# Patient Record
Sex: MALE | Race: WHITE | NOT HISPANIC OR LATINO | Employment: OTHER | URBAN - METROPOLITAN AREA
[De-identification: names, ages, dates, MRNs, and addresses within clinical notes are randomized per-mention and may not be internally consistent; named-entity substitution may affect disease eponyms.]

---

## 2017-01-01 ENCOUNTER — APPOINTMENT (OUTPATIENT)
Dept: PHYSICAL THERAPY | Facility: CLINIC | Age: 67
DRG: 291 | End: 2017-01-01
Payer: COMMERCIAL

## 2017-01-01 ENCOUNTER — NURSING HOME VISIT (OUTPATIENT)
Dept: GERIATRICS | Facility: CLINIC | Age: 67
End: 2017-01-01
Payer: COMMERCIAL

## 2017-01-01 ENCOUNTER — APPOINTMENT (OUTPATIENT)
Dept: GENERAL RADIOLOGY | Facility: CLINIC | Age: 67
DRG: 291 | End: 2017-01-01
Attending: INTERNAL MEDICINE
Payer: COMMERCIAL

## 2017-01-01 ENCOUNTER — APPOINTMENT (OUTPATIENT)
Dept: CARDIOLOGY | Facility: CLINIC | Age: 67
DRG: 291 | End: 2017-01-01
Attending: EMERGENCY MEDICINE
Payer: COMMERCIAL

## 2017-01-01 ENCOUNTER — APPOINTMENT (OUTPATIENT)
Dept: CARDIOLOGY | Facility: CLINIC | Age: 67
End: 2017-01-01
Attending: FAMILY MEDICINE
Payer: COMMERCIAL

## 2017-01-01 ENCOUNTER — TRANSFERRED RECORDS (OUTPATIENT)
Dept: HEALTH INFORMATION MANAGEMENT | Facility: CLINIC | Age: 67
End: 2017-01-01

## 2017-01-01 ENCOUNTER — PRE VISIT (OUTPATIENT)
Dept: GASTROENTEROLOGY | Facility: CLINIC | Age: 67
End: 2017-01-01

## 2017-01-01 ENCOUNTER — HOSPITAL ENCOUNTER (EMERGENCY)
Facility: CLINIC | Age: 67
Discharge: HOME | End: 2017-01-23
Attending: FAMILY MEDICINE | Admitting: FAMILY MEDICINE
Payer: COMMERCIAL

## 2017-01-01 ENCOUNTER — CARE COORDINATION (OUTPATIENT)
Dept: CARE COORDINATION | Facility: CLINIC | Age: 67
End: 2017-01-01

## 2017-01-01 ENCOUNTER — APPOINTMENT (OUTPATIENT)
Dept: GENERAL RADIOLOGY | Facility: CLINIC | Age: 67
DRG: 291 | End: 2017-01-01
Attending: EMERGENCY MEDICINE
Payer: COMMERCIAL

## 2017-01-01 ENCOUNTER — PRE VISIT (OUTPATIENT)
Dept: CARDIOLOGY | Facility: CLINIC | Age: 67
End: 2017-01-01

## 2017-01-01 ENCOUNTER — DOCUMENTATION ONLY (OUTPATIENT)
Dept: OTHER | Facility: CLINIC | Age: 67
End: 2017-01-01

## 2017-01-01 ENCOUNTER — OFFICE VISIT (OUTPATIENT)
Dept: CARDIOLOGY | Facility: CLINIC | Age: 67
End: 2017-01-01
Attending: NURSE PRACTITIONER
Payer: COMMERCIAL

## 2017-01-01 ENCOUNTER — APPOINTMENT (OUTPATIENT)
Dept: OCCUPATIONAL THERAPY | Facility: CLINIC | Age: 67
DRG: 291 | End: 2017-01-01
Attending: EMERGENCY MEDICINE
Payer: COMMERCIAL

## 2017-01-01 ENCOUNTER — NURSING HOME VISIT (OUTPATIENT)
Dept: GERIATRICS | Facility: CLINIC | Age: 67
End: 2017-01-01

## 2017-01-01 ENCOUNTER — APPOINTMENT (OUTPATIENT)
Dept: GENERAL RADIOLOGY | Facility: CLINIC | Age: 67
End: 2017-01-01
Attending: FAMILY MEDICINE
Payer: COMMERCIAL

## 2017-01-01 ENCOUNTER — APPOINTMENT (OUTPATIENT)
Dept: CT IMAGING | Facility: CLINIC | Age: 67
DRG: 291 | End: 2017-01-01
Attending: EMERGENCY MEDICINE
Payer: COMMERCIAL

## 2017-01-01 ENCOUNTER — APPOINTMENT (OUTPATIENT)
Dept: PHYSICAL THERAPY | Facility: CLINIC | Age: 67
DRG: 291 | End: 2017-01-01
Attending: EMERGENCY MEDICINE
Payer: COMMERCIAL

## 2017-01-01 ENCOUNTER — HOSPITAL ENCOUNTER (INPATIENT)
Facility: CLINIC | Age: 67
LOS: 6 days | DRG: 291 | End: 2017-05-11
Attending: EMERGENCY MEDICINE | Admitting: INTERNAL MEDICINE
Payer: COMMERCIAL

## 2017-01-01 ENCOUNTER — TELEPHONE (OUTPATIENT)
Dept: PHARMACY | Facility: OTHER | Age: 67
End: 2017-01-01

## 2017-01-01 ENCOUNTER — APPOINTMENT (OUTPATIENT)
Dept: ULTRASOUND IMAGING | Facility: CLINIC | Age: 67
DRG: 291 | End: 2017-01-01
Attending: EMERGENCY MEDICINE
Payer: COMMERCIAL

## 2017-01-01 VITALS — OXYGEN SATURATION: 94 % | SYSTOLIC BLOOD PRESSURE: 94 MMHG | HEART RATE: 91 BPM | DIASTOLIC BLOOD PRESSURE: 60 MMHG

## 2017-01-01 VITALS
OXYGEN SATURATION: 96 % | WEIGHT: 212.8 LBS | BODY MASS INDEX: 28.82 KG/M2 | HEIGHT: 72 IN | SYSTOLIC BLOOD PRESSURE: 96 MMHG | TEMPERATURE: 97 F | DIASTOLIC BLOOD PRESSURE: 56 MMHG | HEART RATE: 79 BPM | RESPIRATION RATE: 15 BRPM

## 2017-01-01 VITALS
DIASTOLIC BLOOD PRESSURE: 69 MMHG | SYSTOLIC BLOOD PRESSURE: 108 MMHG | BODY MASS INDEX: 25.6 KG/M2 | OXYGEN SATURATION: 100 % | HEIGHT: 72 IN | RESPIRATION RATE: 13 BRPM | TEMPERATURE: 98.8 F | WEIGHT: 189 LBS

## 2017-01-01 VITALS
DIASTOLIC BLOOD PRESSURE: 63 MMHG | BODY MASS INDEX: 29.59 KG/M2 | OXYGEN SATURATION: 92 % | SYSTOLIC BLOOD PRESSURE: 103 MMHG | HEIGHT: 72 IN | WEIGHT: 218.5 LBS | HEART RATE: 86 BPM

## 2017-01-01 VITALS
OXYGEN SATURATION: 95 % | BODY MASS INDEX: 25.25 KG/M2 | RESPIRATION RATE: 18 BRPM | WEIGHT: 186.2 LBS | SYSTOLIC BLOOD PRESSURE: 104 MMHG | TEMPERATURE: 97.1 F | DIASTOLIC BLOOD PRESSURE: 68 MMHG | HEART RATE: 84 BPM

## 2017-01-01 VITALS
WEIGHT: 187.2 LBS | DIASTOLIC BLOOD PRESSURE: 57 MMHG | OXYGEN SATURATION: 95 % | HEART RATE: 77 BPM | SYSTOLIC BLOOD PRESSURE: 90 MMHG | RESPIRATION RATE: 22 BRPM | TEMPERATURE: 97.6 F | BODY MASS INDEX: 25.38 KG/M2

## 2017-01-01 VITALS
TEMPERATURE: 97.8 F | SYSTOLIC BLOOD PRESSURE: 89 MMHG | DIASTOLIC BLOOD PRESSURE: 51 MMHG | HEART RATE: 78 BPM | RESPIRATION RATE: 20 BRPM | WEIGHT: 186 LBS | HEIGHT: 73 IN | BODY MASS INDEX: 24.65 KG/M2 | OXYGEN SATURATION: 94 %

## 2017-01-01 VITALS
SYSTOLIC BLOOD PRESSURE: 104 MMHG | HEART RATE: 77 BPM | OXYGEN SATURATION: 100 % | BODY MASS INDEX: 25.36 KG/M2 | WEIGHT: 187 LBS | TEMPERATURE: 97.7 F | DIASTOLIC BLOOD PRESSURE: 52 MMHG | RESPIRATION RATE: 18 BRPM

## 2017-01-01 DIAGNOSIS — I95.9 HYPOTENSION, UNSPECIFIED HYPOTENSION TYPE: ICD-10-CM

## 2017-01-01 DIAGNOSIS — I50.23 ACUTE ON CHRONIC SYSTOLIC CONGESTIVE HEART FAILURE (H): ICD-10-CM

## 2017-01-01 DIAGNOSIS — R53.81 PHYSICAL DECONDITIONING: ICD-10-CM

## 2017-01-01 DIAGNOSIS — E87.1 HYPONATREMIA: ICD-10-CM

## 2017-01-01 DIAGNOSIS — N18.30 CKD (CHRONIC KIDNEY DISEASE) STAGE 3, GFR 30-59 ML/MIN (H): ICD-10-CM

## 2017-01-01 DIAGNOSIS — K59.09 CHRONIC CONSTIPATION: ICD-10-CM

## 2017-01-01 DIAGNOSIS — I87.2 CHRONIC VENOUS STASIS DERMATITIS OF BOTH LOWER EXTREMITIES: ICD-10-CM

## 2017-01-01 DIAGNOSIS — I50.22 CHRONIC SYSTOLIC HEART FAILURE (H): Primary | ICD-10-CM

## 2017-01-01 DIAGNOSIS — E03.9 HYPOTHYROIDISM, UNSPECIFIED TYPE: Primary | ICD-10-CM

## 2017-01-01 DIAGNOSIS — I50.22 CHRONIC SYSTOLIC CONGESTIVE HEART FAILURE (H): ICD-10-CM

## 2017-01-01 DIAGNOSIS — I25.5 ISCHEMIC CARDIOMYOPATHY: ICD-10-CM

## 2017-01-01 DIAGNOSIS — E03.9 HYPOTHYROIDISM, UNSPECIFIED TYPE: ICD-10-CM

## 2017-01-01 DIAGNOSIS — J44.9 CHRONIC OBSTRUCTIVE PULMONARY DISEASE, UNSPECIFIED COPD TYPE (H): ICD-10-CM

## 2017-01-01 DIAGNOSIS — I50.23 ACUTE ON CHRONIC SYSTOLIC (CONGESTIVE) HEART FAILURE (H): ICD-10-CM

## 2017-01-01 DIAGNOSIS — K92.2 GASTROINTESTINAL HEMORRHAGE, UNSPECIFIED GASTROINTESTINAL HEMORRHAGE TYPE: Primary | ICD-10-CM

## 2017-01-01 DIAGNOSIS — E11.59 TYPE 2 DIABETES MELLITUS WITH OTHER CIRCULATORY COMPLICATION: ICD-10-CM

## 2017-01-01 DIAGNOSIS — I50.43 ACUTE ON CHRONIC COMBINED SYSTOLIC AND DIASTOLIC CONGESTIVE HEART FAILURE (H): ICD-10-CM

## 2017-01-01 DIAGNOSIS — Z87.891 PERSONAL HISTORY OF TOBACCO USE, PRESENTING HAZARDS TO HEALTH: ICD-10-CM

## 2017-01-01 DIAGNOSIS — E11.22 TYPE 2 DIABETES MELLITUS WITH DIABETIC CHRONIC KIDNEY DISEASE, UNSPECIFIED CKD STAGE, UNSPECIFIED LONG TERM INSULIN USE STATUS: ICD-10-CM

## 2017-01-01 DIAGNOSIS — D64.9 ANEMIA, UNSPECIFIED TYPE: ICD-10-CM

## 2017-01-01 DIAGNOSIS — I34.0 MITRAL VALVE INSUFFICIENCY, UNSPECIFIED ETIOLOGY: ICD-10-CM

## 2017-01-01 DIAGNOSIS — R33.9 URINARY RETENTION: ICD-10-CM

## 2017-01-01 DIAGNOSIS — N18.30 CHRONIC KIDNEY DISEASE, STAGE III (MODERATE) (H): ICD-10-CM

## 2017-01-01 DIAGNOSIS — R06.02 SHORTNESS OF BREATH: Primary | ICD-10-CM

## 2017-01-01 DIAGNOSIS — R06.02 SHORTNESS OF BREATH: ICD-10-CM

## 2017-01-01 DIAGNOSIS — I50.22 CHRONIC SYSTOLIC HEART FAILURE (H): ICD-10-CM

## 2017-01-01 DIAGNOSIS — I48.0 PAROXYSMAL ATRIAL FIBRILLATION (H): ICD-10-CM

## 2017-01-01 DIAGNOSIS — R22.43 LOCALIZED SWELLING OF BOTH LOWER LEGS: ICD-10-CM

## 2017-01-01 DIAGNOSIS — K92.2 GASTROINTESTINAL HEMORRHAGE, UNSPECIFIED GASTROINTESTINAL HEMORRHAGE TYPE: ICD-10-CM

## 2017-01-01 DIAGNOSIS — I50.9 CHRONIC CONGESTIVE HEART FAILURE, UNSPECIFIED CONGESTIVE HEART FAILURE TYPE: ICD-10-CM

## 2017-01-01 DIAGNOSIS — I25.9 CHRONIC ISCHEMIC HEART DISEASE: ICD-10-CM

## 2017-01-01 DIAGNOSIS — Z71.89 ADVANCED DIRECTIVES, COUNSELING/DISCUSSION: Chronic | ICD-10-CM

## 2017-01-01 DIAGNOSIS — I13.0 BENIGN HYPERTENSIVE HEART AND RENAL DISEASE WITH HEART FAILURE (H): ICD-10-CM

## 2017-01-01 DIAGNOSIS — I50.22 CHRONIC SYSTOLIC CONGESTIVE HEART FAILURE (H): Primary | ICD-10-CM

## 2017-01-01 LAB
ABO + RH BLD: NORMAL
ABO + RH BLD: NORMAL
ALBUMIN SERPL-MCNC: 1.8 G/DL (ref 3.4–5)
ALBUMIN SERPL-MCNC: 2.5 G/DL (ref 3.4–5)
ALBUMIN SERPL-MCNC: 2.5 G/DL (ref 3.4–5)
ALBUMIN UR-MCNC: 10 MG/DL
ALBUMIN UR-MCNC: NEGATIVE MG/DL
ALBUMIN UR-MCNC: NEGATIVE MG/DL
ALP SERPL-CCNC: 284 U/L (ref 40–150)
ALP SERPL-CCNC: 347 U/L (ref 40–150)
ALP SERPL-CCNC: 370 U/L (ref 40–150)
ALT SERPL W P-5'-P-CCNC: 14 U/L (ref 0–70)
ALT SERPL W P-5'-P-CCNC: 14 U/L (ref 0–70)
ALT SERPL W P-5'-P-CCNC: 16 U/L (ref 0–70)
AMMONIA PLAS-SCNC: 26 UMOL/L (ref 10–50)
ANION GAP SERPL CALCULATED.3IONS-SCNC: 10 MMOL/L (ref 3–14)
ANION GAP SERPL CALCULATED.3IONS-SCNC: 10 MMOL/L (ref 3–14)
ANION GAP SERPL CALCULATED.3IONS-SCNC: 5 MMOL/L (ref 3–14)
ANION GAP SERPL CALCULATED.3IONS-SCNC: 5 MMOL/L (ref 5–18)
ANION GAP SERPL CALCULATED.3IONS-SCNC: 6 MMOL/L (ref 3–14)
ANION GAP SERPL CALCULATED.3IONS-SCNC: 6 MMOL/L (ref 5–18)
ANION GAP SERPL CALCULATED.3IONS-SCNC: 7 MMOL/L (ref 3–14)
ANION GAP SERPL CALCULATED.3IONS-SCNC: 7 MMOL/L (ref 3–14)
ANION GAP SERPL CALCULATED.3IONS-SCNC: 8 MMOL/L (ref 3–14)
ANION GAP SERPL CALCULATED.3IONS-SCNC: 8 MMOL/L (ref 3–14)
ANION GAP SERPL CALCULATED.3IONS-SCNC: 9 MMOL/L (ref 3–14)
APPEARANCE UR: CLEAR
APTT PPP: 41 SEC (ref 22–37)
AST SERPL W P-5'-P-CCNC: 30 U/L (ref 0–45)
AST SERPL W P-5'-P-CCNC: 30 U/L (ref 0–45)
AST SERPL W P-5'-P-CCNC: 31 U/L (ref 0–45)
BACTERIA #/AREA URNS HPF: ABNORMAL /HPF
BACTERIA SPEC CULT: ABNORMAL
BACTERIA SPEC CULT: NO GROWTH
BASE EXCESS BLDA CALC-SCNC: 9.3 MMOL/L
BASOPHILS # BLD AUTO: 0 10E9/L (ref 0–0.2)
BASOPHILS NFR BLD AUTO: 0.2 %
BILIRUB SERPL-MCNC: 0.4 MG/DL (ref 0.2–1.3)
BILIRUB SERPL-MCNC: 0.7 MG/DL (ref 0.2–1.3)
BILIRUB SERPL-MCNC: 1.2 MG/DL (ref 0.2–1.3)
BILIRUB UR QL STRIP: NEGATIVE
BLD GP AB SCN SERPL QL: NORMAL
BLOOD BANK CMNT PATIENT-IMP: NORMAL
BUN SERPL-MCNC: 35 MG/DL (ref 7–30)
BUN SERPL-MCNC: 35 MG/DL (ref 7–30)
BUN SERPL-MCNC: 36 MG/DL (ref 7–30)
BUN SERPL-MCNC: 37 MG/DL (ref 7–30)
BUN SERPL-MCNC: 37 MG/DL (ref 7–30)
BUN SERPL-MCNC: 38 MG/DL (ref 7–30)
BUN SERPL-MCNC: 38 MG/DL (ref 7–30)
BUN SERPL-MCNC: 54 MG/DL (ref 7–30)
BUN SERPL-MCNC: 56 MG/DL (ref 7–30)
BUN SERPL-MCNC: 58 MG/DL (ref 8–22)
BUN SERPL-MCNC: 66 MG/DL (ref 8–22)
CALCIUM SERPL-MCNC: 8.3 MG/DL (ref 8.5–10.1)
CALCIUM SERPL-MCNC: 8.5 MG/DL (ref 8.5–10.1)
CALCIUM SERPL-MCNC: 8.6 MG/DL (ref 8.5–10.1)
CALCIUM SERPL-MCNC: 8.6 MG/DL (ref 8.5–10.1)
CALCIUM SERPL-MCNC: 8.8 MG/DL (ref 8.5–10.1)
CALCIUM SERPL-MCNC: 8.9 MG/DL (ref 8.5–10.1)
CALCIUM SERPL-MCNC: 9 MG/DL (ref 8.5–10.1)
CALCIUM SERPL-MCNC: 9 MG/DL (ref 8.5–10.1)
CALCIUM SERPL-MCNC: 9.1 MG/DL (ref 8.5–10.5)
CALCIUM SERPL-MCNC: 9.2 MG/DL (ref 8.5–10.5)
CAOX CRY #/AREA URNS HPF: ABNORMAL /HPF
CHLORIDE SERPL-SCNC: 89 MMOL/L (ref 94–109)
CHLORIDE SERPL-SCNC: 90 MMOL/L (ref 94–109)
CHLORIDE SERPL-SCNC: 91 MMOL/L (ref 94–109)
CHLORIDE SERPL-SCNC: 92 MMOL/L (ref 94–109)
CHLORIDE SERPL-SCNC: 92 MMOL/L (ref 94–109)
CHLORIDE SERPL-SCNC: 93 MMOL/L (ref 94–109)
CHLORIDE SERPL-SCNC: 93 MMOL/L (ref 94–109)
CHLORIDE SERPL-SCNC: 94 MMOL/L (ref 94–109)
CHLORIDE SERPL-SCNC: 94 MMOL/L (ref 94–109)
CHLORIDE SERPL-SCNC: 95 MMOL/L (ref 94–109)
CHLORIDE SERPL-SCNC: 96 MMOL/L (ref 94–109)
CHLORIDE SERPLBLD-SCNC: 92 MMOL/L (ref 98–107)
CHLORIDE SERPLBLD-SCNC: 93 MMOL/L (ref 98–107)
CO2 BLDCOV-SCNC: 36 MMOL/L (ref 21–28)
CO2 SERPL-SCNC: 28 MMOL/L (ref 20–32)
CO2 SERPL-SCNC: 29 MMOL/L (ref 20–32)
CO2 SERPL-SCNC: 30 MMOL/L (ref 20–32)
CO2 SERPL-SCNC: 31 MMOL/L (ref 20–32)
CO2 SERPL-SCNC: 31 MMOL/L (ref 20–32)
CO2 SERPL-SCNC: 31 MMOL/L (ref 22–31)
CO2 SERPL-SCNC: 32 MMOL/L (ref 20–32)
CO2 SERPL-SCNC: 32 MMOL/L (ref 20–32)
CO2 SERPL-SCNC: 33 MMOL/L (ref 20–32)
CO2 SERPL-SCNC: 33 MMOL/L (ref 22–31)
CO2 SERPL-SCNC: 34 MMOL/L (ref 20–32)
COLOR UR AUTO: NORMAL
COLOR UR AUTO: YELLOW
COLOR UR AUTO: YELLOW
CREAT SERPL-MCNC: 1.35 MG/DL (ref 0.66–1.25)
CREAT SERPL-MCNC: 1.36 MG/DL (ref 0.66–1.25)
CREAT SERPL-MCNC: 1.45 MG/DL (ref 0.66–1.25)
CREAT SERPL-MCNC: 1.45 MG/DL (ref 0.66–1.25)
CREAT SERPL-MCNC: 1.49 MG/DL (ref 0.66–1.25)
CREAT SERPL-MCNC: 1.57 MG/DL (ref 0.66–1.25)
CREAT SERPL-MCNC: 1.65 MG/DL (ref 0.66–1.25)
CREAT SERPL-MCNC: 1.65 MG/DL (ref 0.7–1.3)
CREAT SERPL-MCNC: 1.71 MG/DL (ref 0.66–1.25)
CREAT SERPL-MCNC: 1.8 MG/DL (ref 0.66–1.25)
CREAT SERPL-MCNC: 1.85 MG/DL (ref 0.66–1.25)
CREAT SERPL-MCNC: 1.87 MG/DL (ref 0.66–1.25)
CREAT SERPL-MCNC: 1.91 MG/DL (ref 0.66–1.25)
CREAT SERPL-MCNC: 2.18 MG/DL (ref 0.7–1.3)
DEPRECATED S PNEUM 1 IGG SER-MCNC: 12.1 UG/ML
DEPRECATED S PNEUM12 IGG SER-MCNC: 3.4 UG/ML
DEPRECATED S PNEUM14 IGG SER-MCNC: 17.9 UG/ML
DEPRECATED S PNEUM17 IGG SER-MCNC: 26.7 UG/ML
DEPRECATED S PNEUM19 IGG SER-MCNC: 18.5 UG/ML
DEPRECATED S PNEUM2 IGG SER-MCNC: 8.7 UG/ML
DEPRECATED S PNEUM20 IGG SER-MCNC: 8.3 UG/ML
DEPRECATED S PNEUM22 IGG SER-MCNC: 30.5 UG/ML
DEPRECATED S PNEUM23 IGG SER-MCNC: 62.1 UG/ML
DEPRECATED S PNEUM3 IGG SER-MCNC: 5.4 UG/ML
DEPRECATED S PNEUM34 IGG SER-MCNC: 31.4 UG/ML
DEPRECATED S PNEUM4 IGG SER-MCNC: 1.9 UG/ML
DEPRECATED S PNEUM43 IGG SER-MCNC: 2.7 UG/ML
DEPRECATED S PNEUM5 IGG SER-MCNC: 17.7 UG/ML
DEPRECATED S PNEUM8 IGG SER-MCNC: 9.9 UG/ML
DEPRECATED S PNEUM9 IGG SER-MCNC: 14.1 UG/ML
DIFFERENTIAL METHOD BLD: ABNORMAL
EOSINOPHIL # BLD AUTO: 0 10E9/L (ref 0–0.7)
EOSINOPHIL # BLD AUTO: 0 10E9/L (ref 0–0.7)
EOSINOPHIL # BLD AUTO: 0.2 10E9/L (ref 0–0.7)
EOSINOPHIL NFR BLD AUTO: 0.2 %
EOSINOPHIL NFR BLD AUTO: 0.2 %
EOSINOPHIL NFR BLD AUTO: 1.6 %
ERYTHROCYTE [DISTWIDTH] IN BLOOD BY AUTOMATED COUNT: 17.2 % (ref 10–15)
ERYTHROCYTE [DISTWIDTH] IN BLOOD BY AUTOMATED COUNT: 17.2 % (ref 10–15)
ERYTHROCYTE [DISTWIDTH] IN BLOOD BY AUTOMATED COUNT: 17.4 % (ref 10–15)
ERYTHROCYTE [DISTWIDTH] IN BLOOD BY AUTOMATED COUNT: 17.9 % (ref 10–15)
ERYTHROCYTE [DISTWIDTH] IN BLOOD BY AUTOMATED COUNT: 21.1 % (ref 10–15)
FLUAV H1 2009 PAND RNA SPEC QL NAA+PROBE: NEGATIVE
FLUAV H1 RNA SPEC QL NAA+PROBE: NEGATIVE
FLUAV H3 RNA SPEC QL NAA+PROBE: NEGATIVE
FLUAV RNA SPEC QL NAA+PROBE: ABNORMAL
FLUAV RNA SPEC QL NAA+PROBE: NEGATIVE
FLUAV+FLUBV AG SPEC QL: NEGATIVE
FLUAV+FLUBV AG SPEC QL: NORMAL
FLUBV RNA SPEC QL NAA+PROBE: NEGATIVE
GFR SERPL CREATININE-BSD FRML MDRD: 31 ML/MIN/1.73M2
GFR SERPL CREATININE-BSD FRML MDRD: 36 ML/MIN/1.7M2
GFR SERPL CREATININE-BSD FRML MDRD: 36 ML/MIN/1.7M2
GFR SERPL CREATININE-BSD FRML MDRD: 37 ML/MIN/1.7M2
GFR SERPL CREATININE-BSD FRML MDRD: 38 ML/MIN/1.7M2
GFR SERPL CREATININE-BSD FRML MDRD: 40 ML/MIN/1.7M2
GFR SERPL CREATININE-BSD FRML MDRD: 42 ML/MIN/1.73M2
GFR SERPL CREATININE-BSD FRML MDRD: 42 ML/MIN/1.7M2
GFR SERPL CREATININE-BSD FRML MDRD: 45 ML/MIN/1.7M2
GFR SERPL CREATININE-BSD FRML MDRD: 47 ML/MIN/1.7M2
GFR SERPL CREATININE-BSD FRML MDRD: 49 ML/MIN/1.7M2
GFR SERPL CREATININE-BSD FRML MDRD: 49 ML/MIN/1.7M2
GFR SERPL CREATININE-BSD FRML MDRD: 53 ML/MIN/1.7M2
GFR SERPL CREATININE-BSD FRML MDRD: 53 ML/MIN/1.7M2
GLUCOSE SERPL-MCNC: 102 MG/DL (ref 70–99)
GLUCOSE SERPL-MCNC: 106 MG/DL (ref 70–99)
GLUCOSE SERPL-MCNC: 107 MG/DL (ref 70–99)
GLUCOSE SERPL-MCNC: 107 MG/DL (ref 70–99)
GLUCOSE SERPL-MCNC: 118 MG/DL (ref 70–99)
GLUCOSE SERPL-MCNC: 123 MG/DL (ref 70–99)
GLUCOSE SERPL-MCNC: 124 MG/DL (ref 70–99)
GLUCOSE SERPL-MCNC: 124 MG/DL (ref 70–99)
GLUCOSE SERPL-MCNC: 74 MG/DL (ref 70–125)
GLUCOSE SERPL-MCNC: 77 MG/DL (ref 70–125)
GLUCOSE SERPL-MCNC: 83 MG/DL (ref 70–99)
GLUCOSE SERPL-MCNC: 95 MG/DL (ref 70–99)
GLUCOSE SERPL-MCNC: 97 MG/DL (ref 70–99)
GLUCOSE UR STRIP-MCNC: NEGATIVE MG/DL
GRAM STN SPEC: ABNORMAL
HADV DNA SPEC QL NAA+PROBE: NEGATIVE
HADV DNA SPEC QL NAA+PROBE: NEGATIVE
HCO3 BLD-SCNC: 34 MMOL/L (ref 21–28)
HCT VFR BLD AUTO: 25.5 % (ref 40–53)
HCT VFR BLD AUTO: 25.7 % (ref 40–53)
HCT VFR BLD AUTO: 26.3 % (ref 40–53)
HCT VFR BLD AUTO: 26.5 % (ref 40–53)
HCT VFR BLD AUTO: 28.1 % (ref 40–53)
HEMOGLOBIN: 8 G/DL (ref 14–18)
HGB BLD-MCNC: 8 G/DL (ref 13.3–17.7)
HGB BLD-MCNC: 8.1 G/DL (ref 13.3–17.7)
HGB BLD-MCNC: 8.1 G/DL (ref 13.3–17.7)
HGB BLD-MCNC: 8.4 G/DL (ref 13.3–17.7)
HGB BLD-MCNC: 8.6 G/DL (ref 13.3–17.7)
HGB UR QL STRIP: ABNORMAL
HGB UR QL STRIP: ABNORMAL
HGB UR QL STRIP: NEGATIVE
HMPV RNA SPEC QL NAA+PROBE: NEGATIVE
HPIV1 RNA SPEC QL NAA+PROBE: NEGATIVE
HPIV2 RNA SPEC QL NAA+PROBE: NEGATIVE
HPIV3 RNA SPEC QL NAA+PROBE: NEGATIVE
HYALINE CASTS #/AREA URNS LPF: 7 /LPF (ref 0–2)
IMM GRANULOCYTES # BLD: 0 10E9/L (ref 0–0.4)
IMM GRANULOCYTES NFR BLD: 0.2 %
IMM GRANULOCYTES NFR BLD: 0.3 %
IMM GRANULOCYTES NFR BLD: 0.4 %
INR PPP: 1.23 (ref 0.86–1.14)
INR PPP: 1.36 (ref 0.86–1.14)
INTERPRETATION ECG - MUSE: NORMAL
KETONES UR STRIP-MCNC: NEGATIVE MG/DL
L PNEUMO1 AG UR QL IA: NORMAL
LACTATE BLD-SCNC: 0.7 MMOL/L (ref 0.7–2.1)
LACTATE BLD-SCNC: 0.9 MMOL/L (ref 0.7–2.1)
LACTATE BLD-SCNC: 1 MMOL/L (ref 0.7–2.1)
LACTATE BLD-SCNC: 1.1 MMOL/L (ref 0.7–2.1)
LEUKOCYTE ESTERASE UR QL STRIP: ABNORMAL
LEUKOCYTE ESTERASE UR QL STRIP: NEGATIVE
LEUKOCYTE ESTERASE UR QL STRIP: NEGATIVE
LYMPHOCYTES # BLD AUTO: 0.7 10E9/L (ref 0.8–5.3)
LYMPHOCYTES # BLD AUTO: 0.7 10E9/L (ref 0.8–5.3)
LYMPHOCYTES # BLD AUTO: 0.8 10E9/L (ref 0.8–5.3)
LYMPHOCYTES NFR BLD AUTO: 11.7 %
LYMPHOCYTES NFR BLD AUTO: 12.5 %
LYMPHOCYTES NFR BLD AUTO: 7 %
Lab: ABNORMAL
Lab: NORMAL
MAGNESIUM SERPL-MCNC: 1.9 MG/DL (ref 1.6–2.3)
MAGNESIUM SERPL-MCNC: 2 MG/DL (ref 1.6–2.3)
MAGNESIUM SERPL-MCNC: 2.1 MG/DL (ref 1.6–2.3)
MAGNESIUM SERPL-MCNC: 2.3 MG/DL (ref 1.6–2.3)
MAGNESIUM SERPL-MCNC: 2.4 MG/DL (ref 1.6–2.3)
MCH RBC QN AUTO: 30.5 PG (ref 26.5–33)
MCH RBC QN AUTO: 30.9 PG (ref 26.5–33)
MCH RBC QN AUTO: 31.2 PG (ref 26.5–33)
MCH RBC QN AUTO: 31.5 PG (ref 26.5–33)
MCH RBC QN AUTO: 31.9 PG (ref 26.5–33)
MCHC RBC AUTO-ENTMCNC: 30.6 G/DL (ref 31.5–36.5)
MCHC RBC AUTO-ENTMCNC: 30.8 G/DL (ref 31.5–36.5)
MCHC RBC AUTO-ENTMCNC: 31.1 G/DL (ref 31.5–36.5)
MCHC RBC AUTO-ENTMCNC: 31.7 G/DL (ref 31.5–36.5)
MCHC RBC AUTO-ENTMCNC: 31.8 G/DL (ref 31.5–36.5)
MCV RBC AUTO: 100 FL (ref 78–100)
MCV RBC AUTO: 100 FL (ref 78–100)
MCV RBC AUTO: 102 FL (ref 78–100)
MCV RBC AUTO: 98 FL (ref 78–100)
MCV RBC AUTO: 99 FL (ref 78–100)
MICRO REPORT STATUS: ABNORMAL
MICRO REPORT STATUS: ABNORMAL
MICRO REPORT STATUS: NORMAL
MICROBIOLOGIST REVIEW: NORMAL
MICROORGANISM SPEC CULT: ABNORMAL
MONOCYTES # BLD AUTO: 0.8 10E9/L (ref 0–1.3)
MONOCYTES # BLD AUTO: 0.8 10E9/L (ref 0–1.3)
MONOCYTES # BLD AUTO: 1.4 10E9/L (ref 0–1.3)
MONOCYTES NFR BLD AUTO: 12.8 %
MONOCYTES NFR BLD AUTO: 14.7 %
MONOCYTES NFR BLD AUTO: 15.1 %
MUCOUS THREADS #/AREA URNS LPF: PRESENT /LPF
NEUTROPHILS # BLD AUTO: 4 10E9/L (ref 1.6–8.3)
NEUTROPHILS # BLD AUTO: 4 10E9/L (ref 1.6–8.3)
NEUTROPHILS # BLD AUTO: 8.5 10E9/L (ref 1.6–8.3)
NEUTROPHILS NFR BLD AUTO: 72 %
NEUTROPHILS NFR BLD AUTO: 72.6 %
NEUTROPHILS NFR BLD AUTO: 78.1 %
NITRATE UR QL: NEGATIVE
NRBC # BLD AUTO: 0 10*3/UL
NRBC BLD AUTO-RTO: 0 /100
NT-PROBNP SERPL-MCNC: 7893 PG/ML (ref 0–900)
NT-PROBNP SERPL-MCNC: ABNORMAL PG/ML (ref 0–900)
O2/TOTAL GAS SETTING VFR VENT: 4 %
PCO2 BLD: 51 MM HG (ref 35–45)
PCO2 BLDV: 53 MM HG (ref 40–50)
PH BLD: 7.44 PH (ref 7.35–7.45)
PH BLDV: 7.44 PH (ref 7.32–7.43)
PH UR STRIP: 5.5 PH (ref 5–7)
PH UR STRIP: 6 PH (ref 5–7)
PH UR STRIP: 7 PH (ref 5–7)
PLATELET # BLD AUTO: 112 10E9/L (ref 150–450)
PLATELET # BLD AUTO: 126 10E9/L (ref 150–450)
PLATELET # BLD AUTO: 136 10E9/L (ref 150–450)
PLATELET # BLD AUTO: 141 10E9/L (ref 150–450)
PLATELET # BLD AUTO: 141 10E9/L (ref 150–450)
PLATELET # BLD AUTO: 150 10E9/L (ref 150–450)
PLATELET # BLD AUTO: 92 10E9/L (ref 150–450)
PO2 BLD: 66 MM HG (ref 80–105)
PO2 BLDV: 24 MM HG (ref 25–47)
POTASSIUM SERPL-SCNC: 3.6 MMOL/L (ref 3.4–5.3)
POTASSIUM SERPL-SCNC: 3.6 MMOL/L (ref 3.5–5)
POTASSIUM SERPL-SCNC: 3.7 MMOL/L (ref 3.4–5.3)
POTASSIUM SERPL-SCNC: 3.8 MMOL/L (ref 3.4–5.3)
POTASSIUM SERPL-SCNC: 3.9 MMOL/L (ref 3.4–5.3)
POTASSIUM SERPL-SCNC: 3.9 MMOL/L (ref 3.4–5.3)
POTASSIUM SERPL-SCNC: 4 MMOL/L (ref 3.4–5.3)
POTASSIUM SERPL-SCNC: 4 MMOL/L (ref 3.4–5.3)
POTASSIUM SERPL-SCNC: 4.3 MMOL/L (ref 3.4–5.3)
POTASSIUM SERPL-SCNC: 4.9 MMOL/L (ref 3.5–5)
PROCALCITONIN SERPL-MCNC: 0.58 NG/ML
PROT SERPL-MCNC: 7.5 G/DL (ref 6.8–8.8)
PROT SERPL-MCNC: 7.6 G/DL (ref 6.8–8.8)
PROT SERPL-MCNC: 7.6 G/DL (ref 6.8–8.8)
RBC # BLD AUTO: 2.54 10E12/L (ref 4.4–5.9)
RBC # BLD AUTO: 2.62 10E12/L (ref 4.4–5.9)
RBC # BLD AUTO: 2.62 10E12/L (ref 4.4–5.9)
RBC # BLD AUTO: 2.67 10E12/L (ref 4.4–5.9)
RBC # BLD AUTO: 2.76 10E12/L (ref 4.4–5.9)
RBC #/AREA URNS AUTO: 13 /HPF (ref 0–2)
RBC #/AREA URNS AUTO: 33 /HPF (ref 0–2)
RHINOVIRUS RNA SPEC QL NAA+PROBE: NEGATIVE
RSV RNA SPEC QL NAA+PROBE: NEGATIVE
RSV RNA SPEC QL NAA+PROBE: NEGATIVE
S PNEUM DA 15B IGG SER-MCNC: 5.1 UG/ML
S PNEUM DA 18C IGG SER-MCNC: 1.6
S PNEUM DA 19A IGG SER-MCNC: 15.3 UG/ML
S PNEUM DA 33F IGG SER-MCNC: 6.6 UG/ML
S PNEUM DA 6B IGG SER-MCNC: 16.6 UG/ML
S PNEUM DA 7F IGG SER-MCNC: 35.1 UG/ML
S PNEUM DA 9V IGG SER-MCNC: 17.9 UG/ML
SAO2 % BLDV FROM PO2: 43 %
SODIUM SERPL-SCNC: 129 MMOL/L (ref 133–144)
SODIUM SERPL-SCNC: 129 MMOL/L (ref 133–144)
SODIUM SERPL-SCNC: 129 MMOL/L (ref 136–145)
SODIUM SERPL-SCNC: 130 MMOL/L (ref 133–144)
SODIUM SERPL-SCNC: 131 MMOL/L (ref 133–144)
SODIUM SERPL-SCNC: 131 MMOL/L (ref 136–145)
SODIUM SERPL-SCNC: 132 MMOL/L (ref 133–144)
SODIUM SERPL-SCNC: 132 MMOL/L (ref 133–144)
SODIUM SERPL-SCNC: 133 MMOL/L (ref 133–144)
SODIUM SERPL-SCNC: 134 MMOL/L (ref 133–144)
SP GR UR STRIP: 1.01 (ref 1–1.03)
SPECIMEN EXP DATE BLD: NORMAL
SPECIMEN SOURCE: ABNORMAL
SPECIMEN SOURCE: NORMAL
SQUAMOUS #/AREA URNS AUTO: <1 /HPF (ref 0–1)
T4 FREE SERPL-MCNC: 1.18 NG/DL (ref 0.76–1.46)
T4 FREE SERPL-MCNC: 1.33 NG/DL (ref 0.76–1.46)
TROPONIN I SERPL-MCNC: 0.03 UG/L (ref 0–0.04)
TROPONIN I SERPL-MCNC: 0.03 UG/L (ref 0–0.04)
TSH SERPL DL<=0.005 MIU/L-ACNC: 12.94 MU/L (ref 0.4–4)
TSH SERPL DL<=0.005 MIU/L-ACNC: 13 MU/L (ref 0.4–4)
URN SPEC COLLECT METH UR: ABNORMAL
URN SPEC COLLECT METH UR: ABNORMAL
URN SPEC COLLECT METH UR: NORMAL
UROBILINOGEN UR STRIP-MCNC: NORMAL MG/DL (ref 0–2)
VANCOMYCIN SERPL-MCNC: 25.5 MG/L
VANCOMYCIN SERPL-MCNC: 36.5 MG/L
VANCOMYCIN SERPL-MCNC: 46.4 MG/L
WBC # BLD AUTO: 10.8 10E9/L (ref 4–11)
WBC # BLD AUTO: 11.5 10E9/L (ref 4–11)
WBC # BLD AUTO: 5.6 10E9/L (ref 4–11)
WBC # BLD AUTO: 5.6 10E9/L (ref 4–11)
WBC # BLD AUTO: 6.7 10E9/L (ref 4–11)
WBC #/AREA URNS AUTO: 22 /HPF (ref 0–2)
WBC #/AREA URNS AUTO: <1 /HPF (ref 0–2)

## 2017-01-01 PROCEDURE — 25000125 ZZHC RX 250

## 2017-01-01 PROCEDURE — 93308 TTE F-UP OR LMTD: CPT | Mod: 26 | Performed by: INTERNAL MEDICINE

## 2017-01-01 PROCEDURE — 71020 XR CHEST 2 VW: CPT

## 2017-01-01 PROCEDURE — 93010 ELECTROCARDIOGRAM REPORT: CPT | Mod: Z6 | Performed by: EMERGENCY MEDICINE

## 2017-01-01 PROCEDURE — 99232 SBSQ HOSP IP/OBS MODERATE 35: CPT | Mod: GC | Performed by: INTERNAL MEDICINE

## 2017-01-01 PROCEDURE — 80202 ASSAY OF VANCOMYCIN: CPT | Performed by: INTERNAL MEDICINE

## 2017-01-01 PROCEDURE — 21400006 ZZH R&B CCU INTERMEDIATE UMMC

## 2017-01-01 PROCEDURE — 25000128 H RX IP 250 OP 636: Performed by: INTERNAL MEDICINE

## 2017-01-01 PROCEDURE — 25000125 ZZHC RX 250: Performed by: INTERNAL MEDICINE

## 2017-01-01 PROCEDURE — 80048 BASIC METABOLIC PNL TOTAL CA: CPT | Performed by: INTERNAL MEDICINE

## 2017-01-01 PROCEDURE — 99285 EMERGENCY DEPT VISIT HI MDM: CPT | Mod: 25 | Performed by: FAMILY MEDICINE

## 2017-01-01 PROCEDURE — S0171 BUMETANIDE 0.5 MG: HCPCS | Performed by: INTERNAL MEDICINE

## 2017-01-01 PROCEDURE — 40000141 ZZH STATISTIC PERIPHERAL IV START W/O US GUIDANCE

## 2017-01-01 PROCEDURE — 80048 BASIC METABOLIC PNL TOTAL CA: CPT | Performed by: EMERGENCY MEDICINE

## 2017-01-01 PROCEDURE — 87804 INFLUENZA ASSAY W/OPTIC: CPT | Performed by: EMERGENCY MEDICINE

## 2017-01-01 PROCEDURE — 40000275 ZZH STATISTIC RCP TIME EA 10 MIN

## 2017-01-01 PROCEDURE — 74000 XR ABDOMEN PORT F1 VW: CPT

## 2017-01-01 PROCEDURE — 85049 AUTOMATED PLATELET COUNT: CPT | Performed by: EMERGENCY MEDICINE

## 2017-01-01 PROCEDURE — 36415 COLL VENOUS BLD VENIPUNCTURE: CPT | Performed by: INTERNAL MEDICINE

## 2017-01-01 PROCEDURE — 25000132 ZZH RX MED GY IP 250 OP 250 PS 637: Performed by: STUDENT IN AN ORGANIZED HEALTH CARE EDUCATION/TRAINING PROGRAM

## 2017-01-01 PROCEDURE — 83735 ASSAY OF MAGNESIUM: CPT | Performed by: INTERNAL MEDICINE

## 2017-01-01 PROCEDURE — 85025 COMPLETE CBC W/AUTO DIFF WBC: CPT | Performed by: EMERGENCY MEDICINE

## 2017-01-01 PROCEDURE — 82803 BLOOD GASES ANY COMBINATION: CPT

## 2017-01-01 PROCEDURE — 85027 COMPLETE CBC AUTOMATED: CPT | Performed by: INTERNAL MEDICINE

## 2017-01-01 PROCEDURE — 84145 PROCALCITONIN (PCT): CPT | Performed by: EMERGENCY MEDICINE

## 2017-01-01 PROCEDURE — 93325 DOPPLER ECHO COLOR FLOW MAPG: CPT | Mod: 26 | Performed by: INTERNAL MEDICINE

## 2017-01-01 PROCEDURE — 82565 ASSAY OF CREATININE: CPT | Performed by: INTERNAL MEDICINE

## 2017-01-01 PROCEDURE — 97110 THERAPEUTIC EXERCISES: CPT | Mod: GP

## 2017-01-01 PROCEDURE — 97530 THERAPEUTIC ACTIVITIES: CPT | Mod: GP

## 2017-01-01 PROCEDURE — 83880 ASSAY OF NATRIURETIC PEPTIDE: CPT | Performed by: EMERGENCY MEDICINE

## 2017-01-01 PROCEDURE — 93010 ELECTROCARDIOGRAM REPORT: CPT | Performed by: INTERNAL MEDICINE

## 2017-01-01 PROCEDURE — 99233 SBSQ HOSP IP/OBS HIGH 50: CPT | Mod: GC | Performed by: INTERNAL MEDICINE

## 2017-01-01 PROCEDURE — 93005 ELECTROCARDIOGRAM TRACING: CPT | Performed by: EMERGENCY MEDICINE

## 2017-01-01 PROCEDURE — 81003 URINALYSIS AUTO W/O SCOPE: CPT | Performed by: FAMILY MEDICINE

## 2017-01-01 PROCEDURE — 25000128 H RX IP 250 OP 636: Performed by: STUDENT IN AN ORGANIZED HEALTH CARE EDUCATION/TRAINING PROGRAM

## 2017-01-01 PROCEDURE — 93005 ELECTROCARDIOGRAM TRACING: CPT

## 2017-01-01 PROCEDURE — 87186 SC STD MICRODIL/AGAR DIL: CPT | Performed by: EMERGENCY MEDICINE

## 2017-01-01 PROCEDURE — 97162 PT EVAL MOD COMPLEX 30 MIN: CPT | Mod: GP

## 2017-01-01 PROCEDURE — 83880 ASSAY OF NATRIURETIC PEPTIDE: CPT | Performed by: FAMILY MEDICINE

## 2017-01-01 PROCEDURE — 99310 SBSQ NF CARE HIGH MDM 45: CPT | Performed by: NURSE PRACTITIONER

## 2017-01-01 PROCEDURE — 86317 IMMUNOASSAY INFECTIOUS AGENT: CPT | Performed by: EMERGENCY MEDICINE

## 2017-01-01 PROCEDURE — 25000125 ZZHC RX 250: Performed by: STUDENT IN AN ORGANIZED HEALTH CARE EDUCATION/TRAINING PROGRAM

## 2017-01-01 PROCEDURE — 86901 BLOOD TYPING SEROLOGIC RH(D): CPT | Performed by: FAMILY MEDICINE

## 2017-01-01 PROCEDURE — 93010 ELECTROCARDIOGRAM REPORT: CPT | Mod: Z6 | Performed by: FAMILY MEDICINE

## 2017-01-01 PROCEDURE — 71010 XR CHEST PORT 1 VW: CPT

## 2017-01-01 PROCEDURE — 40000193 ZZH STATISTIC PT WARD VISIT

## 2017-01-01 PROCEDURE — 93308 TTE F-UP OR LMTD: CPT

## 2017-01-01 PROCEDURE — 87040 BLOOD CULTURE FOR BACTERIA: CPT | Performed by: EMERGENCY MEDICINE

## 2017-01-01 PROCEDURE — 99223 1ST HOSP IP/OBS HIGH 75: CPT | Mod: 25 | Performed by: INTERNAL MEDICINE

## 2017-01-01 PROCEDURE — 85730 THROMBOPLASTIN TIME PARTIAL: CPT | Performed by: FAMILY MEDICINE

## 2017-01-01 PROCEDURE — 99285 EMERGENCY DEPT VISIT HI MDM: CPT | Mod: 25 | Performed by: EMERGENCY MEDICINE

## 2017-01-01 PROCEDURE — 83735 ASSAY OF MAGNESIUM: CPT | Performed by: EMERGENCY MEDICINE

## 2017-01-01 PROCEDURE — 94640 AIRWAY INHALATION TREATMENT: CPT | Mod: 76

## 2017-01-01 PROCEDURE — 25000128 H RX IP 250 OP 636: Performed by: EMERGENCY MEDICINE

## 2017-01-01 PROCEDURE — 99309 SBSQ NF CARE MODERATE MDM 30: CPT | Performed by: NURSE PRACTITIONER

## 2017-01-01 PROCEDURE — 86850 RBC ANTIBODY SCREEN: CPT | Performed by: FAMILY MEDICINE

## 2017-01-01 PROCEDURE — 87205 SMEAR GRAM STAIN: CPT | Performed by: EMERGENCY MEDICINE

## 2017-01-01 PROCEDURE — 82140 ASSAY OF AMMONIA: CPT | Performed by: EMERGENCY MEDICINE

## 2017-01-01 PROCEDURE — 99207 ZZC CDG-CORRECTLY CODED, REVIEWED AND AGREE: CPT | Performed by: NURSE PRACTITIONER

## 2017-01-01 PROCEDURE — 99223 1ST HOSP IP/OBS HIGH 75: CPT | Performed by: INTERNAL MEDICINE

## 2017-01-01 PROCEDURE — 94640 AIRWAY INHALATION TREATMENT: CPT | Performed by: FAMILY MEDICINE

## 2017-01-01 PROCEDURE — 99213 OFFICE O/P EST LOW 20 MIN: CPT | Mod: ZF

## 2017-01-01 PROCEDURE — 40000133 ZZH STATISTIC OT WARD VISIT

## 2017-01-01 PROCEDURE — 85610 PROTHROMBIN TIME: CPT | Performed by: FAMILY MEDICINE

## 2017-01-01 PROCEDURE — 96376 TX/PRO/DX INJ SAME DRUG ADON: CPT | Performed by: EMERGENCY MEDICINE

## 2017-01-01 PROCEDURE — 80053 COMPREHEN METABOLIC PANEL: CPT | Performed by: EMERGENCY MEDICINE

## 2017-01-01 PROCEDURE — 83605 ASSAY OF LACTIC ACID: CPT

## 2017-01-01 PROCEDURE — 99207 ZZC CDG-CORRECTLY CODED, REVIEWED AND AGREE: CPT | Performed by: INTERNAL MEDICINE

## 2017-01-01 PROCEDURE — 93306 TTE W/DOPPLER COMPLETE: CPT | Mod: 26 | Performed by: INTERNAL MEDICINE

## 2017-01-01 PROCEDURE — 87899 AGENT NOS ASSAY W/OPTIC: CPT | Performed by: EMERGENCY MEDICINE

## 2017-01-01 PROCEDURE — 36415 COLL VENOUS BLD VENIPUNCTURE: CPT | Performed by: EMERGENCY MEDICINE

## 2017-01-01 PROCEDURE — 25000132 ZZH RX MED GY IP 250 OP 250 PS 637: Performed by: INTERNAL MEDICINE

## 2017-01-01 PROCEDURE — 70450 CT HEAD/BRAIN W/O DYE: CPT

## 2017-01-01 PROCEDURE — 83605 ASSAY OF LACTIC ACID: CPT | Performed by: INTERNAL MEDICINE

## 2017-01-01 PROCEDURE — 97530 THERAPEUTIC ACTIVITIES: CPT | Mod: GO

## 2017-01-01 PROCEDURE — 25000128 H RX IP 250 OP 636

## 2017-01-01 PROCEDURE — 93306 TTE W/DOPPLER COMPLETE: CPT

## 2017-01-01 PROCEDURE — 99212 OFFICE O/P EST SF 10 MIN: CPT | Mod: ZF

## 2017-01-01 PROCEDURE — 93005 ELECTROCARDIOGRAM TRACING: CPT | Performed by: FAMILY MEDICINE

## 2017-01-01 PROCEDURE — 83605 ASSAY OF LACTIC ACID: CPT | Performed by: EMERGENCY MEDICINE

## 2017-01-01 PROCEDURE — 82803 BLOOD GASES ANY COMBINATION: CPT | Performed by: EMERGENCY MEDICINE

## 2017-01-01 PROCEDURE — 84439 ASSAY OF FREE THYROXINE: CPT | Performed by: EMERGENCY MEDICINE

## 2017-01-01 PROCEDURE — 94640 AIRWAY INHALATION TREATMENT: CPT | Performed by: OPTOMETRIST

## 2017-01-01 PROCEDURE — 81001 URINALYSIS AUTO W/SCOPE: CPT | Performed by: EMERGENCY MEDICINE

## 2017-01-01 PROCEDURE — 94640 AIRWAY INHALATION TREATMENT: CPT | Performed by: EMERGENCY MEDICINE

## 2017-01-01 PROCEDURE — 87077 CULTURE AEROBIC IDENTIFY: CPT | Performed by: EMERGENCY MEDICINE

## 2017-01-01 PROCEDURE — 94640 AIRWAY INHALATION TREATMENT: CPT

## 2017-01-01 PROCEDURE — 85610 PROTHROMBIN TIME: CPT | Performed by: EMERGENCY MEDICINE

## 2017-01-01 PROCEDURE — 97116 GAIT TRAINING THERAPY: CPT | Mod: GP

## 2017-01-01 PROCEDURE — 99238 HOSP IP/OBS DSCHRG MGMT 30/<: CPT | Mod: GC | Performed by: INTERNAL MEDICINE

## 2017-01-01 PROCEDURE — 85025 COMPLETE CBC W/AUTO DIFF WBC: CPT | Performed by: FAMILY MEDICINE

## 2017-01-01 PROCEDURE — 84484 ASSAY OF TROPONIN QUANT: CPT | Performed by: EMERGENCY MEDICINE

## 2017-01-01 PROCEDURE — 87070 CULTURE OTHR SPECIMN AEROBIC: CPT | Performed by: EMERGENCY MEDICINE

## 2017-01-01 PROCEDURE — 99215 OFFICE O/P EST HI 40 MIN: CPT | Performed by: NURSE PRACTITIONER

## 2017-01-01 PROCEDURE — 96374 THER/PROPH/DIAG INJ IV PUSH: CPT | Performed by: EMERGENCY MEDICINE

## 2017-01-01 PROCEDURE — 25500064 ZZH RX 255 OP 636: Performed by: FAMILY MEDICINE

## 2017-01-01 PROCEDURE — 84443 ASSAY THYROID STIM HORMONE: CPT | Performed by: INTERNAL MEDICINE

## 2017-01-01 PROCEDURE — 84443 ASSAY THYROID STIM HORMONE: CPT | Performed by: EMERGENCY MEDICINE

## 2017-01-01 PROCEDURE — 87633 RESP VIRUS 12-25 TARGETS: CPT | Performed by: EMERGENCY MEDICINE

## 2017-01-01 PROCEDURE — 86900 BLOOD TYPING SEROLOGIC ABO: CPT | Performed by: FAMILY MEDICINE

## 2017-01-01 PROCEDURE — 97110 THERAPEUTIC EXERCISES: CPT | Mod: GO

## 2017-01-01 PROCEDURE — 93321 DOPPLER ECHO F-UP/LMTD STD: CPT | Mod: 26 | Performed by: INTERNAL MEDICINE

## 2017-01-01 PROCEDURE — 40000802 ZZH SITE CHECK

## 2017-01-01 PROCEDURE — 97165 OT EVAL LOW COMPLEX 30 MIN: CPT | Mod: GO

## 2017-01-01 PROCEDURE — 80053 COMPREHEN METABOLIC PANEL: CPT | Performed by: FAMILY MEDICINE

## 2017-01-01 PROCEDURE — 99214 OFFICE O/P EST MOD 30 MIN: CPT | Mod: ZP | Performed by: NURSE PRACTITIONER

## 2017-01-01 PROCEDURE — 84132 ASSAY OF SERUM POTASSIUM: CPT | Performed by: INTERNAL MEDICINE

## 2017-01-01 PROCEDURE — 25000125 ZZHC RX 250: Performed by: FAMILY MEDICINE

## 2017-01-01 PROCEDURE — 93970 EXTREMITY STUDY: CPT

## 2017-01-01 PROCEDURE — 40000264 ECHO LIMITED WITH DEFINITY

## 2017-01-01 PROCEDURE — 25000125 ZZHC RX 250: Performed by: EMERGENCY MEDICINE

## 2017-01-01 PROCEDURE — 84439 ASSAY OF FREE THYROXINE: CPT | Performed by: INTERNAL MEDICINE

## 2017-01-01 PROCEDURE — 84484 ASSAY OF TROPONIN QUANT: CPT | Performed by: FAMILY MEDICINE

## 2017-01-01 PROCEDURE — 87086 URINE CULTURE/COLONY COUNT: CPT | Performed by: INTERNAL MEDICINE

## 2017-01-01 PROCEDURE — 40000275 ZZH STATISTIC RCP TIME EA 10 MIN: Performed by: OPTOMETRIST

## 2017-01-01 RX ORDER — PIPERACILLIN SODIUM, TAZOBACTAM SODIUM 4; .5 G/20ML; G/20ML
4.5 INJECTION, POWDER, LYOPHILIZED, FOR SOLUTION INTRAVENOUS EVERY 6 HOURS
Status: DISCONTINUED | OUTPATIENT
Start: 2017-01-01 | End: 2017-01-01

## 2017-01-01 RX ORDER — TRIAMCINOLONE ACETONIDE 1 MG/G
CREAM TOPICAL 2 TIMES DAILY
COMMUNITY
End: 2017-01-01

## 2017-01-01 RX ORDER — ONDANSETRON 4 MG/1
4 TABLET, ORALLY DISINTEGRATING ORAL EVERY 6 HOURS PRN
Status: DISCONTINUED | OUTPATIENT
Start: 2017-01-01 | End: 2017-01-01 | Stop reason: HOSPADM

## 2017-01-01 RX ORDER — IPRATROPIUM BROMIDE AND ALBUTEROL SULFATE 2.5; .5 MG/3ML; MG/3ML
3 SOLUTION RESPIRATORY (INHALATION) EVERY 4 HOURS PRN
Status: DISCONTINUED | OUTPATIENT
Start: 2017-01-01 | End: 2017-01-01

## 2017-01-01 RX ORDER — FUROSEMIDE 10 MG/ML
80 INJECTION INTRAMUSCULAR; INTRAVENOUS ONCE
Status: COMPLETED | OUTPATIENT
Start: 2017-01-01 | End: 2017-01-01

## 2017-01-01 RX ORDER — AMOXICILLIN 250 MG
1-2 CAPSULE ORAL 2 TIMES DAILY
Status: DISCONTINUED | OUTPATIENT
Start: 2017-01-01 | End: 2017-01-01

## 2017-01-01 RX ORDER — LIDOCAINE 50 MG/G
1 PATCH TOPICAL EVERY 24 HOURS
Status: DISCONTINUED | OUTPATIENT
Start: 2017-01-01 | End: 2017-01-01 | Stop reason: HOSPADM

## 2017-01-01 RX ORDER — BUMETANIDE 0.25 MG/ML
1 INJECTION INTRAMUSCULAR; INTRAVENOUS ONCE
Status: COMPLETED | OUTPATIENT
Start: 2017-01-01 | End: 2017-01-01

## 2017-01-01 RX ORDER — MINERAL OIL/HYDROPHIL PETROLAT
OINTMENT (GRAM) TOPICAL EVERY 8 HOURS PRN
Status: DISCONTINUED | OUTPATIENT
Start: 2017-01-01 | End: 2017-01-01 | Stop reason: HOSPADM

## 2017-01-01 RX ORDER — POTASSIUM CHLORIDE 7.45 MG/ML
10 INJECTION INTRAVENOUS
Status: DISCONTINUED | OUTPATIENT
Start: 2017-01-01 | End: 2017-01-01

## 2017-01-01 RX ORDER — LIDOCAINE 40 MG/G
CREAM TOPICAL
Status: DISCONTINUED | OUTPATIENT
Start: 2017-01-01 | End: 2017-01-01 | Stop reason: HOSPADM

## 2017-01-01 RX ORDER — IPRATROPIUM BROMIDE AND ALBUTEROL SULFATE 2.5; .5 MG/3ML; MG/3ML
SOLUTION RESPIRATORY (INHALATION)
Status: DISCONTINUED
Start: 2017-01-01 | End: 2017-01-01 | Stop reason: HOSPADM

## 2017-01-01 RX ORDER — METOPROLOL SUCCINATE 25 MG/1
25 TABLET, EXTENDED RELEASE ORAL DAILY
Qty: 30 TABLET | Refills: 3 | Status: SHIPPED
Start: 2017-01-01 | End: 2017-01-01

## 2017-01-01 RX ORDER — POTASSIUM CHLORIDE 29.8 MG/ML
20 INJECTION INTRAVENOUS
Status: DISCONTINUED | OUTPATIENT
Start: 2017-01-01 | End: 2017-01-01

## 2017-01-01 RX ORDER — MULTIPLE VITAMINS W/ MINERALS TAB 9MG-400MCG
1 TAB ORAL DAILY
Status: DISCONTINUED | OUTPATIENT
Start: 2017-01-01 | End: 2017-01-01

## 2017-01-01 RX ORDER — POTASSIUM CHLORIDE 750 MG/1
20 TABLET, EXTENDED RELEASE ORAL 2 TIMES DAILY
Status: DISCONTINUED | OUTPATIENT
Start: 2017-01-01 | End: 2017-01-01

## 2017-01-01 RX ORDER — ONDANSETRON 2 MG/ML
4 INJECTION INTRAMUSCULAR; INTRAVENOUS EVERY 6 HOURS PRN
Status: DISCONTINUED | OUTPATIENT
Start: 2017-01-01 | End: 2017-01-01 | Stop reason: HOSPADM

## 2017-01-01 RX ORDER — METOPROLOL SUCCINATE 25 MG/1
12.5 TABLET, EXTENDED RELEASE ORAL DAILY
Qty: 30 TABLET | Refills: 3 | COMMUNITY
Start: 2017-01-01

## 2017-01-01 RX ORDER — POTASSIUM CHLORIDE 1.5 G/1.58G
20-40 POWDER, FOR SOLUTION ORAL
Status: DISCONTINUED | OUTPATIENT
Start: 2017-01-01 | End: 2017-01-01

## 2017-01-01 RX ORDER — PANTOPRAZOLE SODIUM 40 MG/1
40 TABLET, DELAYED RELEASE ORAL
Status: DISCONTINUED | OUTPATIENT
Start: 2017-01-01 | End: 2017-01-01

## 2017-01-01 RX ORDER — LISINOPRIL 2.5 MG/1
2.5 TABLET ORAL DAILY
Status: DISCONTINUED | OUTPATIENT
Start: 2017-01-01 | End: 2017-01-01

## 2017-01-01 RX ORDER — POTASSIUM CL/LIDO/0.9 % NACL 10MEQ/0.1L
10 INTRAVENOUS SOLUTION, PIGGYBACK (ML) INTRAVENOUS
Status: DISCONTINUED | OUTPATIENT
Start: 2017-01-01 | End: 2017-01-01

## 2017-01-01 RX ORDER — HYDROMORPHONE HYDROCHLORIDE 1 MG/ML
.5-1 SOLUTION ORAL EVERY 6 HOURS PRN
Status: DISCONTINUED | OUTPATIENT
Start: 2017-01-01 | End: 2017-01-01

## 2017-01-01 RX ORDER — AMIODARONE HYDROCHLORIDE 200 MG/1
200 TABLET ORAL DAILY
Status: DISCONTINUED | OUTPATIENT
Start: 2017-01-01 | End: 2017-01-01

## 2017-01-01 RX ORDER — POTASSIUM CHLORIDE 750 MG/1
20-40 TABLET, EXTENDED RELEASE ORAL
Status: DISCONTINUED | OUTPATIENT
Start: 2017-01-01 | End: 2017-01-01

## 2017-01-01 RX ORDER — FERROUS GLUCONATE 324(38)MG
324 TABLET ORAL 2 TIMES DAILY
Status: DISCONTINUED | OUTPATIENT
Start: 2017-01-01 | End: 2017-01-01

## 2017-01-01 RX ORDER — BISACODYL 10 MG
10 SUPPOSITORY, RECTAL RECTAL DAILY PRN
Status: DISCONTINUED | OUTPATIENT
Start: 2017-01-01 | End: 2017-01-01 | Stop reason: HOSPADM

## 2017-01-01 RX ORDER — IPRATROPIUM BROMIDE AND ALBUTEROL SULFATE 2.5; .5 MG/3ML; MG/3ML
1 SOLUTION RESPIRATORY (INHALATION) EVERY 6 HOURS PRN
Qty: 1 BOX | Refills: 1 | Status: SHIPPED | OUTPATIENT
Start: 2017-01-01

## 2017-01-01 RX ORDER — GUAIFENESIN 600 MG/1
600 TABLET, EXTENDED RELEASE ORAL 2 TIMES DAILY
Status: DISCONTINUED | OUTPATIENT
Start: 2017-01-01 | End: 2017-01-01

## 2017-01-01 RX ORDER — ACETAMINOPHEN 500 MG
1000 TABLET ORAL EVERY 6 HOURS PRN
Status: DISCONTINUED | OUTPATIENT
Start: 2017-01-01 | End: 2017-01-01 | Stop reason: HOSPADM

## 2017-01-01 RX ORDER — MAGNESIUM OXIDE 400 MG/1
400 TABLET ORAL DAILY
Status: DISCONTINUED | OUTPATIENT
Start: 2017-01-01 | End: 2017-01-01

## 2017-01-01 RX ORDER — MORPHINE SULFATE 2 MG/ML
1-2 INJECTION, SOLUTION INTRAMUSCULAR; INTRAVENOUS
Status: DISCONTINUED | OUTPATIENT
Start: 2017-01-01 | End: 2017-01-01

## 2017-01-01 RX ORDER — GABAPENTIN 100 MG/1
200 CAPSULE ORAL AT BEDTIME
Status: DISCONTINUED | OUTPATIENT
Start: 2017-01-01 | End: 2017-01-01 | Stop reason: HOSPADM

## 2017-01-01 RX ORDER — POLYETHYLENE GLYCOL 3350 17 G/17G
17 POWDER, FOR SOLUTION ORAL 2 TIMES DAILY
Status: DISCONTINUED | OUTPATIENT
Start: 2017-01-01 | End: 2017-01-01

## 2017-01-01 RX ORDER — HYDROMORPHONE HYDROCHLORIDE 1 MG/ML
1 SOLUTION ORAL ONCE
Status: COMPLETED | OUTPATIENT
Start: 2017-01-01 | End: 2017-01-01

## 2017-01-01 RX ORDER — IPRATROPIUM BROMIDE AND ALBUTEROL SULFATE 2.5; .5 MG/3ML; MG/3ML
3 SOLUTION RESPIRATORY (INHALATION) ONCE
Status: COMPLETED | OUTPATIENT
Start: 2017-01-01 | End: 2017-01-01

## 2017-01-01 RX ORDER — NALOXONE HYDROCHLORIDE 0.4 MG/ML
.1-.4 INJECTION, SOLUTION INTRAMUSCULAR; INTRAVENOUS; SUBCUTANEOUS
Status: DISCONTINUED | OUTPATIENT
Start: 2017-01-01 | End: 2017-01-01 | Stop reason: HOSPADM

## 2017-01-01 RX ORDER — ATROPINE SULFATE 10 MG/ML
1-2 SOLUTION/ DROPS OPHTHALMIC
Status: DISCONTINUED | OUTPATIENT
Start: 2017-01-01 | End: 2017-01-01 | Stop reason: HOSPADM

## 2017-01-01 RX ORDER — POTASSIUM CHLORIDE 750 MG/1
20 TABLET, EXTENDED RELEASE ORAL ONCE
Status: COMPLETED | OUTPATIENT
Start: 2017-01-01 | End: 2017-01-01

## 2017-01-01 RX ORDER — HYDROMORPHONE HYDROCHLORIDE 1 MG/ML
.3-.5 INJECTION, SOLUTION INTRAMUSCULAR; INTRAVENOUS; SUBCUTANEOUS
Status: DISCONTINUED | OUTPATIENT
Start: 2017-01-01 | End: 2017-01-01

## 2017-01-01 RX ORDER — HYDROMORPHONE HYDROCHLORIDE 1 MG/ML
1-2 SOLUTION ORAL EVERY 6 HOURS PRN
Status: DISCONTINUED | OUTPATIENT
Start: 2017-01-01 | End: 2017-01-01 | Stop reason: HOSPADM

## 2017-01-01 RX ORDER — ALBUTEROL SULFATE 90 UG/1
2 AEROSOL, METERED RESPIRATORY (INHALATION) EVERY 6 HOURS PRN
Status: DISCONTINUED | OUTPATIENT
Start: 2017-01-01 | End: 2017-01-01 | Stop reason: HOSPADM

## 2017-01-01 RX ORDER — LISINOPRIL 2.5 MG/1
2.5 TABLET ORAL DAILY
Qty: 30 TABLET | COMMUNITY
Start: 2017-01-01

## 2017-01-01 RX ORDER — ASPIRIN 81 MG/1
81 TABLET, CHEWABLE ORAL DAILY
Status: DISCONTINUED | OUTPATIENT
Start: 2017-01-01 | End: 2017-01-01

## 2017-01-01 RX ORDER — HYDRALAZINE HYDROCHLORIDE 20 MG/ML
10 INJECTION INTRAMUSCULAR; INTRAVENOUS ONCE
Status: COMPLETED | OUTPATIENT
Start: 2017-01-01 | End: 2017-01-01

## 2017-01-01 RX ORDER — FUROSEMIDE 10 MG/ML
40 INJECTION INTRAMUSCULAR; INTRAVENOUS ONCE
Status: COMPLETED | OUTPATIENT
Start: 2017-01-01 | End: 2017-01-01

## 2017-01-01 RX ORDER — HYDROMORPHONE HYDROCHLORIDE 1 MG/ML
1 SOLUTION ORAL EVERY 6 HOURS PRN
Status: DISCONTINUED | OUTPATIENT
Start: 2017-01-01 | End: 2017-01-01

## 2017-01-01 RX ORDER — MAGNESIUM SULFATE HEPTAHYDRATE 40 MG/ML
4 INJECTION, SOLUTION INTRAVENOUS EVERY 4 HOURS PRN
Status: DISCONTINUED | OUTPATIENT
Start: 2017-01-01 | End: 2017-01-01

## 2017-01-01 RX ORDER — ATORVASTATIN CALCIUM 10 MG/1
20 TABLET, FILM COATED ORAL DAILY
Status: DISCONTINUED | OUTPATIENT
Start: 2017-01-01 | End: 2017-01-01

## 2017-01-01 RX ORDER — IPRATROPIUM BROMIDE AND ALBUTEROL SULFATE 2.5; .5 MG/3ML; MG/3ML
1 SOLUTION RESPIRATORY (INHALATION) EVERY 6 HOURS PRN
Status: DISCONTINUED | OUTPATIENT
Start: 2017-01-01 | End: 2017-01-01 | Stop reason: HOSPADM

## 2017-01-01 RX ORDER — LACTULOSE 10 G/15ML
20 SOLUTION ORAL 2 TIMES DAILY
Status: DISCONTINUED | OUTPATIENT
Start: 2017-01-01 | End: 2017-01-01

## 2017-01-01 RX ORDER — TORSEMIDE 20 MG/1
60 TABLET ORAL 2 TIMES DAILY
COMMUNITY
Start: 2017-01-01

## 2017-01-01 RX ORDER — MORPHINE SULFATE 2 MG/ML
4 INJECTION, SOLUTION INTRAMUSCULAR; INTRAVENOUS
Status: DISCONTINUED | OUTPATIENT
Start: 2017-01-01 | End: 2017-01-01 | Stop reason: HOSPADM

## 2017-01-01 RX ORDER — AMOXICILLIN 250 MG
2 CAPSULE ORAL 2 TIMES DAILY
Status: DISCONTINUED | OUTPATIENT
Start: 2017-01-01 | End: 2017-01-01

## 2017-01-01 RX ORDER — POTASSIUM CHLORIDE 1500 MG/1
20 TABLET, EXTENDED RELEASE ORAL 2 TIMES DAILY
Qty: 90 TABLET | COMMUNITY
Start: 2017-01-01

## 2017-01-01 RX ORDER — POLYETHYLENE GLYCOL 3350 17 G/17G
17 POWDER, FOR SOLUTION ORAL DAILY PRN
Status: DISCONTINUED | OUTPATIENT
Start: 2017-01-01 | End: 2017-01-01 | Stop reason: HOSPADM

## 2017-01-01 RX ORDER — ALLOPURINOL 300 MG/1
300 TABLET ORAL DAILY
Status: DISCONTINUED | OUTPATIENT
Start: 2017-01-01 | End: 2017-01-01

## 2017-01-01 RX ADMIN — FLUTICASONE FUROATE AND VILANTEROL TRIFENATATE 1 PUFF: 200; 25 POWDER RESPIRATORY (INHALATION) at 08:58

## 2017-01-01 RX ADMIN — VANCOMYCIN HYDROCHLORIDE 1750 MG: 10 INJECTION, POWDER, LYOPHILIZED, FOR SOLUTION INTRAVENOUS at 07:15

## 2017-01-01 RX ADMIN — ALLOPURINOL 300 MG: 300 TABLET ORAL at 08:56

## 2017-01-01 RX ADMIN — PIPERACILLIN SODIUM,TAZOBACTAM SODIUM 4.5 G: 4; .5 INJECTION, POWDER, FOR SOLUTION INTRAVENOUS at 18:41

## 2017-01-01 RX ADMIN — MAGNESIUM OXIDE TAB 400 MG (241.3 MG ELEMENTAL MG) 400 MG: 400 (241.3 MG) TAB at 08:13

## 2017-01-01 RX ADMIN — SENNOSIDES AND DOCUSATE SODIUM 1 TABLET: 8.6; 5 TABLET ORAL at 20:18

## 2017-01-01 RX ADMIN — VANCOMYCIN HYDROCHLORIDE 1750 MG: 10 INJECTION, POWDER, LYOPHILIZED, FOR SOLUTION INTRAVENOUS at 08:03

## 2017-01-01 RX ADMIN — Medication 10 MG: at 22:05

## 2017-01-01 RX ADMIN — OXYCODONE HYDROCHLORIDE 2.5 MG: 5 TABLET ORAL at 23:04

## 2017-01-01 RX ADMIN — FERROUS GLUCONATE 324 MG: 324 TABLET ORAL at 19:50

## 2017-01-01 RX ADMIN — OYSTER SHELL CALCIUM WITH VITAMIN D 1 TABLET: 500; 200 TABLET, FILM COATED ORAL at 08:56

## 2017-01-01 RX ADMIN — ENOXAPARIN SODIUM 40 MG: 40 INJECTION SUBCUTANEOUS at 11:56

## 2017-01-01 RX ADMIN — ENOXAPARIN SODIUM 40 MG: 40 INJECTION SUBCUTANEOUS at 08:57

## 2017-01-01 RX ADMIN — ALLOPURINOL 300 MG: 300 TABLET ORAL at 08:12

## 2017-01-01 RX ADMIN — AMIODARONE HYDROCHLORIDE 200 MG: 200 TABLET ORAL at 08:57

## 2017-01-01 RX ADMIN — POTASSIUM CHLORIDE 20 MEQ: 750 TABLET, EXTENDED RELEASE ORAL at 08:58

## 2017-01-01 RX ADMIN — FUROSEMIDE 80 MG: 10 INJECTION, SOLUTION INTRAVENOUS at 01:31

## 2017-01-01 RX ADMIN — FERROUS GLUCONATE 324 MG: 324 TABLET ORAL at 20:30

## 2017-01-01 RX ADMIN — GUAIFENESIN 600 MG: 600 TABLET, EXTENDED RELEASE ORAL at 08:53

## 2017-01-01 RX ADMIN — MULTIPLE VITAMINS W/ MINERALS TAB 1 TABLET: TAB at 08:53

## 2017-01-01 RX ADMIN — POTASSIUM CHLORIDE 20 MEQ: 750 TABLET, EXTENDED RELEASE ORAL at 03:09

## 2017-01-01 RX ADMIN — OYSTER SHELL CALCIUM WITH VITAMIN D 1 TABLET: 500; 200 TABLET, FILM COATED ORAL at 08:58

## 2017-01-01 RX ADMIN — ATORVASTATIN CALCIUM 20 MG: 10 TABLET, FILM COATED ORAL at 08:57

## 2017-01-01 RX ADMIN — IPRATROPIUM BROMIDE AND ALBUTEROL SULFATE 3 ML: .5; 3 SOLUTION RESPIRATORY (INHALATION) at 16:44

## 2017-01-01 RX ADMIN — SENNOSIDES AND DOCUSATE SODIUM 2 TABLET: 8.6; 5 TABLET ORAL at 08:13

## 2017-01-01 RX ADMIN — SENNOSIDES AND DOCUSATE SODIUM 2 TABLET: 8.6; 5 TABLET ORAL at 19:45

## 2017-01-01 RX ADMIN — ALLOPURINOL 300 MG: 300 TABLET ORAL at 09:43

## 2017-01-01 RX ADMIN — GUAIFENESIN 600 MG: 600 TABLET, EXTENDED RELEASE ORAL at 20:18

## 2017-01-01 RX ADMIN — PIPERACILLIN SODIUM,TAZOBACTAM SODIUM 4.5 G: 4; .5 INJECTION, POWDER, FOR SOLUTION INTRAVENOUS at 00:02

## 2017-01-01 RX ADMIN — MORPHINE SULFATE 1 MG: 2 INJECTION, SOLUTION INTRAMUSCULAR; INTRAVENOUS at 10:46

## 2017-01-01 RX ADMIN — PANTOPRAZOLE SODIUM 40 MG: 40 TABLET, DELAYED RELEASE ORAL at 08:52

## 2017-01-01 RX ADMIN — Medication 10 MG: at 21:44

## 2017-01-01 RX ADMIN — OYSTER SHELL CALCIUM WITH VITAMIN D 1 TABLET: 500; 200 TABLET, FILM COATED ORAL at 09:33

## 2017-01-01 RX ADMIN — ACETAMINOPHEN 1000 MG: 500 TABLET, FILM COATED ORAL at 19:58

## 2017-01-01 RX ADMIN — OXYCODONE HYDROCHLORIDE 2.5 MG: 5 TABLET ORAL at 03:34

## 2017-01-01 RX ADMIN — LISINOPRIL 2.5 MG: 2.5 TABLET ORAL at 08:13

## 2017-01-01 RX ADMIN — OXYCODONE HYDROCHLORIDE 2.5 MG: 5 TABLET ORAL at 11:04

## 2017-01-01 RX ADMIN — PIPERACILLIN SODIUM,TAZOBACTAM SODIUM 4.5 G: 4; .5 INJECTION, POWDER, FOR SOLUTION INTRAVENOUS at 23:58

## 2017-01-01 RX ADMIN — FLUTICASONE FUROATE AND VILANTEROL TRIFENATATE 1 PUFF: 200; 25 POWDER RESPIRATORY (INHALATION) at 08:07

## 2017-01-01 RX ADMIN — SENNOSIDES AND DOCUSATE SODIUM 2 TABLET: 8.6; 5 TABLET ORAL at 19:50

## 2017-01-01 RX ADMIN — PANTOPRAZOLE SODIUM 40 MG: 40 TABLET, DELAYED RELEASE ORAL at 08:58

## 2017-01-01 RX ADMIN — ACETAMINOPHEN 1000 MG: 500 TABLET, FILM COATED ORAL at 03:26

## 2017-01-01 RX ADMIN — OXYCODONE HYDROCHLORIDE 2.5 MG: 5 TABLET ORAL at 14:45

## 2017-01-01 RX ADMIN — PIPERACILLIN SODIUM,TAZOBACTAM SODIUM 4.5 G: 4; .5 INJECTION, POWDER, FOR SOLUTION INTRAVENOUS at 23:04

## 2017-01-01 RX ADMIN — IPRATROPIUM BROMIDE AND ALBUTEROL SULFATE 3 ML: .5; 3 SOLUTION RESPIRATORY (INHALATION) at 02:56

## 2017-01-01 RX ADMIN — ENOXAPARIN SODIUM 40 MG: 40 INJECTION SUBCUTANEOUS at 08:13

## 2017-01-01 RX ADMIN — CHLOROTHIAZIDE SODIUM 500 MG: 500 INJECTION, POWDER, LYOPHILIZED, FOR SOLUTION INTRAVENOUS at 01:17

## 2017-01-01 RX ADMIN — ATORVASTATIN CALCIUM 20 MG: 10 TABLET, FILM COATED ORAL at 08:50

## 2017-01-01 RX ADMIN — FERROUS GLUCONATE 324 MG: 324 TABLET ORAL at 14:12

## 2017-01-01 RX ADMIN — POTASSIUM CHLORIDE 20 MEQ: 750 TABLET, EXTENDED RELEASE ORAL at 11:35

## 2017-01-01 RX ADMIN — PERFLUTREN 8 ML: 6.52 INJECTION, SUSPENSION INTRAVENOUS at 14:14

## 2017-01-01 RX ADMIN — Medication 12.5 MG: at 14:11

## 2017-01-01 RX ADMIN — FLUTICASONE FUROATE AND VILANTEROL TRIFENATATE 1 PUFF: 200; 25 POWDER RESPIRATORY (INHALATION) at 09:46

## 2017-01-01 RX ADMIN — FUROSEMIDE 40 MG: 10 INJECTION, SOLUTION INTRAVENOUS at 22:38

## 2017-01-01 RX ADMIN — PIPERACILLIN SODIUM,TAZOBACTAM SODIUM 4.5 G: 4; .5 INJECTION, POWDER, FOR SOLUTION INTRAVENOUS at 06:54

## 2017-01-01 RX ADMIN — OYSTER SHELL CALCIUM WITH VITAMIN D 1 TABLET: 500; 200 TABLET, FILM COATED ORAL at 08:52

## 2017-01-01 RX ADMIN — PANTOPRAZOLE SODIUM 40 MG: 40 TABLET, DELAYED RELEASE ORAL at 14:12

## 2017-01-01 RX ADMIN — ASPIRIN 81 MG CHEWABLE TABLET 81 MG: 81 TABLET CHEWABLE at 08:57

## 2017-01-01 RX ADMIN — MULTIPLE VITAMINS W/ MINERALS TAB 1 TABLET: TAB at 08:59

## 2017-01-01 RX ADMIN — AMIODARONE HYDROCHLORIDE 200 MG: 200 TABLET ORAL at 08:12

## 2017-01-01 RX ADMIN — PIPERACILLIN SODIUM,TAZOBACTAM SODIUM 4.5 G: 4; .5 INJECTION, POWDER, FOR SOLUTION INTRAVENOUS at 17:29

## 2017-01-01 RX ADMIN — PIPERACILLIN SODIUM,TAZOBACTAM SODIUM 4.5 G: 4; .5 INJECTION, POWDER, FOR SOLUTION INTRAVENOUS at 18:19

## 2017-01-01 RX ADMIN — OYSTER SHELL CALCIUM WITH VITAMIN D 1 TABLET: 500; 200 TABLET, FILM COATED ORAL at 18:44

## 2017-01-01 RX ADMIN — GUAIFENESIN 600 MG: 600 TABLET, EXTENDED RELEASE ORAL at 19:45

## 2017-01-01 RX ADMIN — Medication 12.5 MG: at 21:15

## 2017-01-01 RX ADMIN — ATORVASTATIN CALCIUM 20 MG: 10 TABLET, FILM COATED ORAL at 08:12

## 2017-01-01 RX ADMIN — FERROUS GLUCONATE 324 MG: 324 TABLET ORAL at 08:58

## 2017-01-01 RX ADMIN — PIPERACILLIN SODIUM,TAZOBACTAM SODIUM 4.5 G: 4; .5 INJECTION, POWDER, FOR SOLUTION INTRAVENOUS at 05:39

## 2017-01-01 RX ADMIN — LISINOPRIL 2.5 MG: 2.5 TABLET ORAL at 08:52

## 2017-01-01 RX ADMIN — SENNOSIDES AND DOCUSATE SODIUM 2 TABLET: 8.6; 5 TABLET ORAL at 08:57

## 2017-01-01 RX ADMIN — UMECLIDINIUM 1 PUFF: 62.5 AEROSOL, POWDER ORAL at 08:57

## 2017-01-01 RX ADMIN — PIPERACILLIN SODIUM,TAZOBACTAM SODIUM 4.5 G: 4; .5 INJECTION, POWDER, FOR SOLUTION INTRAVENOUS at 11:05

## 2017-01-01 RX ADMIN — POTASSIUM CHLORIDE 20 MEQ: 750 TABLET, EXTENDED RELEASE ORAL at 20:26

## 2017-01-01 RX ADMIN — PIPERACILLIN SODIUM,TAZOBACTAM SODIUM 4.5 G: 4; .5 INJECTION, POWDER, FOR SOLUTION INTRAVENOUS at 00:10

## 2017-01-01 RX ADMIN — ENOXAPARIN SODIUM 40 MG: 40 INJECTION SUBCUTANEOUS at 08:48

## 2017-01-01 RX ADMIN — FERROUS GLUCONATE 324 MG: 324 TABLET ORAL at 08:51

## 2017-01-01 RX ADMIN — LIDOCAINE 1 PATCH: 50 PATCH CUTANEOUS at 11:33

## 2017-01-01 RX ADMIN — FUROSEMIDE 80 MG: 10 INJECTION, SOLUTION INTRAVENOUS at 16:29

## 2017-01-01 RX ADMIN — POTASSIUM CHLORIDE 20 MEQ: 750 TABLET, EXTENDED RELEASE ORAL at 14:15

## 2017-01-01 RX ADMIN — AMIODARONE HYDROCHLORIDE 200 MG: 200 TABLET ORAL at 09:42

## 2017-01-01 RX ADMIN — FERROUS GLUCONATE 324 MG: 324 TABLET ORAL at 20:25

## 2017-01-01 RX ADMIN — IPRATROPIUM BROMIDE AND ALBUTEROL SULFATE 3 ML: .5; 3 SOLUTION RESPIRATORY (INHALATION) at 06:07

## 2017-01-01 RX ADMIN — PIPERACILLIN SODIUM,TAZOBACTAM SODIUM 4.5 G: 4; .5 INJECTION, POWDER, FOR SOLUTION INTRAVENOUS at 05:36

## 2017-01-01 RX ADMIN — UMECLIDINIUM 1 PUFF: 62.5 AEROSOL, POWDER ORAL at 08:58

## 2017-01-01 RX ADMIN — PIPERACILLIN SODIUM,TAZOBACTAM SODIUM 4.5 G: 4; .5 INJECTION, POWDER, FOR SOLUTION INTRAVENOUS at 18:03

## 2017-01-01 RX ADMIN — LIDOCAINE 1 PATCH: 50 PATCH CUTANEOUS at 09:03

## 2017-01-01 RX ADMIN — POTASSIUM CHLORIDE 20 MEQ: 750 TABLET, EXTENDED RELEASE ORAL at 19:59

## 2017-01-01 RX ADMIN — FLUTICASONE FUROATE AND VILANTEROL TRIFENATATE 1 PUFF: 200; 25 POWDER RESPIRATORY (INHALATION) at 08:59

## 2017-01-01 RX ADMIN — LIDOCAINE 1 PATCH: 50 PATCH CUTANEOUS at 09:43

## 2017-01-01 RX ADMIN — AMIODARONE HYDROCHLORIDE 200 MG: 200 TABLET ORAL at 08:59

## 2017-01-01 RX ADMIN — POTASSIUM CHLORIDE 20 MEQ: 750 TABLET, EXTENDED RELEASE ORAL at 08:51

## 2017-01-01 RX ADMIN — OYSTER SHELL CALCIUM WITH VITAMIN D 1 TABLET: 500; 200 TABLET, FILM COATED ORAL at 18:11

## 2017-01-01 RX ADMIN — FERROUS GLUCONATE 324 MG: 324 TABLET ORAL at 20:18

## 2017-01-01 RX ADMIN — Medication 10 MG: at 21:17

## 2017-01-01 RX ADMIN — Medication 1 MG: at 13:06

## 2017-01-01 RX ADMIN — OYSTER SHELL CALCIUM WITH VITAMIN D 1 TABLET: 500; 200 TABLET, FILM COATED ORAL at 18:03

## 2017-01-01 RX ADMIN — Medication 1 MG: at 03:02

## 2017-01-01 RX ADMIN — POTASSIUM CHLORIDE 20 MEQ: 750 TABLET, EXTENDED RELEASE ORAL at 19:51

## 2017-01-01 RX ADMIN — POTASSIUM CHLORIDE 20 MEQ: 750 TABLET, EXTENDED RELEASE ORAL at 08:13

## 2017-01-01 RX ADMIN — ACETAMINOPHEN 1000 MG: 500 TABLET, FILM COATED ORAL at 19:50

## 2017-01-01 RX ADMIN — MULTIPLE VITAMINS W/ MINERALS TAB 1 TABLET: TAB at 08:12

## 2017-01-01 RX ADMIN — LACTULOSE 20 G: 20 SOLUTION ORAL at 08:58

## 2017-01-01 RX ADMIN — OXYCODONE HYDROCHLORIDE 2.5 MG: 5 TABLET ORAL at 23:33

## 2017-01-01 RX ADMIN — IPRATROPIUM BROMIDE AND ALBUTEROL SULFATE 3 ML: .5; 3 SOLUTION RESPIRATORY (INHALATION) at 21:34

## 2017-01-01 RX ADMIN — PIPERACILLIN SODIUM,TAZOBACTAM SODIUM 4.5 G: 4; .5 INJECTION, POWDER, FOR SOLUTION INTRAVENOUS at 05:50

## 2017-01-01 RX ADMIN — GABAPENTIN 200 MG: 100 CAPSULE ORAL at 21:44

## 2017-01-01 RX ADMIN — UMECLIDINIUM 1 PUFF: 62.5 AEROSOL, POWDER ORAL at 08:08

## 2017-01-01 RX ADMIN — LIDOCAINE 1 PATCH: 50 PATCH CUTANEOUS at 08:54

## 2017-01-01 RX ADMIN — MULTIPLE VITAMINS W/ MINERALS TAB 1 TABLET: TAB at 14:14

## 2017-01-01 RX ADMIN — GUAIFENESIN 600 MG: 600 TABLET, EXTENDED RELEASE ORAL at 08:07

## 2017-01-01 RX ADMIN — ASPIRIN 81 MG CHEWABLE TABLET 81 MG: 81 TABLET CHEWABLE at 08:51

## 2017-01-01 RX ADMIN — LISINOPRIL 2.5 MG: 2.5 TABLET ORAL at 08:57

## 2017-01-01 RX ADMIN — POTASSIUM CHLORIDE 20 MEQ: 750 TABLET, EXTENDED RELEASE ORAL at 01:03

## 2017-01-01 RX ADMIN — Medication 0.75 MG/HR: at 23:59

## 2017-01-01 RX ADMIN — HYDRALAZINE HYDROCHLORIDE 10 MG: 20 INJECTION INTRAMUSCULAR; INTRAVENOUS at 17:33

## 2017-01-01 RX ADMIN — PANTOPRAZOLE SODIUM 40 MG: 40 TABLET, DELAYED RELEASE ORAL at 09:56

## 2017-01-01 RX ADMIN — PANTOPRAZOLE SODIUM 40 MG: 40 TABLET, DELAYED RELEASE ORAL at 08:11

## 2017-01-01 RX ADMIN — VANCOMYCIN HYDROCHLORIDE 1750 MG: 10 INJECTION, POWDER, LYOPHILIZED, FOR SOLUTION INTRAVENOUS at 20:26

## 2017-01-01 RX ADMIN — GABAPENTIN 200 MG: 100 CAPSULE ORAL at 23:59

## 2017-01-01 RX ADMIN — LISINOPRIL 2.5 MG: 2.5 TABLET ORAL at 09:33

## 2017-01-01 RX ADMIN — GABAPENTIN 200 MG: 100 CAPSULE ORAL at 21:56

## 2017-01-01 RX ADMIN — GUAIFENESIN 600 MG: 600 TABLET, EXTENDED RELEASE ORAL at 14:14

## 2017-01-01 RX ADMIN — GABAPENTIN 200 MG: 100 CAPSULE ORAL at 09:45

## 2017-01-01 RX ADMIN — VANCOMYCIN HYDROCHLORIDE 1750 MG: 10 INJECTION, POWDER, LYOPHILIZED, FOR SOLUTION INTRAVENOUS at 20:18

## 2017-01-01 RX ADMIN — VANCOMYCIN HYDROCHLORIDE 1750 MG: 10 INJECTION, POWDER, LYOPHILIZED, FOR SOLUTION INTRAVENOUS at 09:16

## 2017-01-01 RX ADMIN — POTASSIUM CHLORIDE 20 MEQ: 750 TABLET, EXTENDED RELEASE ORAL at 19:49

## 2017-01-01 RX ADMIN — FUROSEMIDE 80 MG: 10 INJECTION, SOLUTION INTRAVENOUS at 03:08

## 2017-01-01 RX ADMIN — GABAPENTIN 200 MG: 100 CAPSULE ORAL at 22:34

## 2017-01-01 RX ADMIN — MORPHINE SULFATE 1 MG: 2 INJECTION, SOLUTION INTRAMUSCULAR; INTRAVENOUS at 10:31

## 2017-01-01 RX ADMIN — MAGNESIUM OXIDE TAB 400 MG (241.3 MG ELEMENTAL MG) 400 MG: 400 (241.3 MG) TAB at 08:51

## 2017-01-01 RX ADMIN — OYSTER SHELL CALCIUM WITH VITAMIN D 1 TABLET: 500; 200 TABLET, FILM COATED ORAL at 18:41

## 2017-01-01 RX ADMIN — UMECLIDINIUM 1 PUFF: 62.5 AEROSOL, POWDER ORAL at 08:53

## 2017-01-01 RX ADMIN — FERROUS GLUCONATE 324 MG: 324 TABLET ORAL at 08:13

## 2017-01-01 RX ADMIN — Medication 12.5 MG: at 08:51

## 2017-01-01 RX ADMIN — POTASSIUM CHLORIDE 20 MEQ: 750 TABLET, EXTENDED RELEASE ORAL at 19:45

## 2017-01-01 RX ADMIN — IPRATROPIUM BROMIDE AND ALBUTEROL SULFATE 3 ML: .5; 3 SOLUTION RESPIRATORY (INHALATION) at 04:43

## 2017-01-01 RX ADMIN — LEVOTHYROXINE SODIUM 175 MCG: 100 TABLET ORAL at 09:55

## 2017-01-01 RX ADMIN — ATORVASTATIN CALCIUM 20 MG: 10 TABLET, FILM COATED ORAL at 09:32

## 2017-01-01 RX ADMIN — LIDOCAINE 1 PATCH: 50 PATCH CUTANEOUS at 09:46

## 2017-01-01 RX ADMIN — PIPERACILLIN SODIUM,TAZOBACTAM SODIUM 4.5 G: 4; .5 INJECTION, POWDER, FOR SOLUTION INTRAVENOUS at 18:11

## 2017-01-01 RX ADMIN — HYDROMORPHONE HYDROCHLORIDE 1 MG: 1 SOLUTION ORAL at 21:15

## 2017-01-01 RX ADMIN — PIPERACILLIN SODIUM,TAZOBACTAM SODIUM 4.5 G: 4; .5 INJECTION, POWDER, FOR SOLUTION INTRAVENOUS at 05:37

## 2017-01-01 RX ADMIN — IPRATROPIUM BROMIDE AND ALBUTEROL SULFATE 3 ML: .5; 3 SOLUTION RESPIRATORY (INHALATION) at 21:36

## 2017-01-01 RX ADMIN — OYSTER SHELL CALCIUM WITH VITAMIN D 1 TABLET: 500; 200 TABLET, FILM COATED ORAL at 14:11

## 2017-01-01 RX ADMIN — ACETAMINOPHEN 1000 MG: 500 TABLET, FILM COATED ORAL at 21:01

## 2017-01-01 RX ADMIN — GUAIFENESIN 600 MG: 600 TABLET, EXTENDED RELEASE ORAL at 22:34

## 2017-01-01 RX ADMIN — Medication 10 MG: at 22:34

## 2017-01-01 RX ADMIN — SENNOSIDES AND DOCUSATE SODIUM 2 TABLET: 8.6; 5 TABLET ORAL at 08:49

## 2017-01-01 RX ADMIN — AMIODARONE HYDROCHLORIDE 200 MG: 200 TABLET ORAL at 14:12

## 2017-01-01 RX ADMIN — ASPIRIN 81 MG CHEWABLE TABLET 81 MG: 81 TABLET CHEWABLE at 09:33

## 2017-01-01 RX ADMIN — Medication 2 G: at 11:36

## 2017-01-01 RX ADMIN — PIPERACILLIN SODIUM,TAZOBACTAM SODIUM 4.5 G: 4; .5 INJECTION, POWDER, FOR SOLUTION INTRAVENOUS at 00:14

## 2017-01-01 RX ADMIN — MULTIPLE VITAMINS W/ MINERALS TAB 1 TABLET: TAB at 09:34

## 2017-01-01 RX ADMIN — LEVOTHYROXINE SODIUM 175 MCG: 100 TABLET ORAL at 08:59

## 2017-01-01 RX ADMIN — PIPERACILLIN SODIUM,TAZOBACTAM SODIUM 4.5 G: 4; .5 INJECTION, POWDER, FOR SOLUTION INTRAVENOUS at 06:35

## 2017-01-01 RX ADMIN — POTASSIUM CHLORIDE 20 MEQ: 750 TABLET, EXTENDED RELEASE ORAL at 20:17

## 2017-01-01 RX ADMIN — SENNOSIDES AND DOCUSATE SODIUM 2 TABLET: 8.6; 5 TABLET ORAL at 08:56

## 2017-01-01 RX ADMIN — OXYCODONE HYDROCHLORIDE 2.5 MG: 5 TABLET ORAL at 21:01

## 2017-01-01 RX ADMIN — POTASSIUM CHLORIDE 20 MEQ: 750 TABLET, EXTENDED RELEASE ORAL at 18:11

## 2017-01-01 RX ADMIN — AMIODARONE HYDROCHLORIDE 200 MG: 200 TABLET ORAL at 08:53

## 2017-01-01 RX ADMIN — Medication 10 MG: at 21:56

## 2017-01-01 RX ADMIN — PIPERACILLIN SODIUM,TAZOBACTAM SODIUM 4.5 G: 4; .5 INJECTION, POWDER, FOR SOLUTION INTRAVENOUS at 11:59

## 2017-01-01 RX ADMIN — Medication 12.5 MG: at 08:56

## 2017-01-01 RX ADMIN — HYDROMORPHONE HYDROCHLORIDE 0.5 MG: 1 INJECTION, SOLUTION INTRAMUSCULAR; INTRAVENOUS; SUBCUTANEOUS at 09:14

## 2017-01-01 RX ADMIN — OYSTER SHELL CALCIUM WITH VITAMIN D 1 TABLET: 500; 200 TABLET, FILM COATED ORAL at 08:12

## 2017-01-01 RX ADMIN — LACTULOSE 20 G: 20 SOLUTION ORAL at 09:50

## 2017-01-01 RX ADMIN — LEVOTHYROXINE SODIUM 175 MCG: 100 TABLET ORAL at 08:12

## 2017-01-01 RX ADMIN — OXYCODONE HYDROCHLORIDE 2.5 MG: 5 TABLET ORAL at 01:34

## 2017-01-01 RX ADMIN — PANTOPRAZOLE SODIUM 40 MG: 40 TABLET, DELAYED RELEASE ORAL at 08:57

## 2017-01-01 RX ADMIN — GABAPENTIN 200 MG: 100 CAPSULE ORAL at 22:05

## 2017-01-01 RX ADMIN — GUAIFENESIN 600 MG: 600 TABLET, EXTENDED RELEASE ORAL at 20:27

## 2017-01-01 RX ADMIN — POLYETHYLENE GLYCOL 3350 17 G: 17 POWDER, FOR SOLUTION ORAL at 09:45

## 2017-01-01 RX ADMIN — PIPERACILLIN SODIUM,TAZOBACTAM SODIUM 4.5 G: 4; .5 INJECTION, POWDER, FOR SOLUTION INTRAVENOUS at 12:30

## 2017-01-01 RX ADMIN — LACTULOSE 20 G: 20 SOLUTION ORAL at 08:12

## 2017-01-01 RX ADMIN — Medication 1 MG: at 20:39

## 2017-01-01 RX ADMIN — GUAIFENESIN 600 MG: 600 TABLET, EXTENDED RELEASE ORAL at 09:05

## 2017-01-01 RX ADMIN — ENOXAPARIN SODIUM 40 MG: 40 INJECTION SUBCUTANEOUS at 08:58

## 2017-01-01 RX ADMIN — ATORVASTATIN CALCIUM 20 MG: 10 TABLET, FILM COATED ORAL at 14:14

## 2017-01-01 RX ADMIN — GUAIFENESIN 600 MG: 600 TABLET, EXTENDED RELEASE ORAL at 19:50

## 2017-01-01 RX ADMIN — MAGNESIUM OXIDE TAB 400 MG (241.3 MG ELEMENTAL MG) 400 MG: 400 (241.3 MG) TAB at 14:12

## 2017-01-01 RX ADMIN — Medication 12.5 MG: at 09:44

## 2017-01-01 RX ADMIN — OYSTER SHELL CALCIUM WITH VITAMIN D 1 TABLET: 500; 200 TABLET, FILM COATED ORAL at 17:29

## 2017-01-01 RX ADMIN — MAGNESIUM OXIDE TAB 400 MG (241.3 MG ELEMENTAL MG) 400 MG: 400 (241.3 MG) TAB at 08:59

## 2017-01-01 RX ADMIN — MAGNESIUM SULFATE IN DEXTROSE 1 G: 10 INJECTION, SOLUTION INTRAVENOUS at 01:49

## 2017-01-01 RX ADMIN — Medication 0.5 MG/HR: at 13:52

## 2017-01-01 RX ADMIN — GABAPENTIN 200 MG: 100 CAPSULE ORAL at 21:15

## 2017-01-01 RX ADMIN — ALBUTEROL SULFATE 2 PUFF: 90 AEROSOL, METERED RESPIRATORY (INHALATION) at 01:33

## 2017-01-01 RX ADMIN — Medication 12.5 MG: at 08:58

## 2017-01-01 RX ADMIN — FERROUS GLUCONATE 324 MG: 324 TABLET ORAL at 09:43

## 2017-01-01 RX ADMIN — Medication 12.5 MG: at 08:12

## 2017-01-01 RX ADMIN — SENNOSIDES AND DOCUSATE SODIUM 2 TABLET: 8.6; 5 TABLET ORAL at 20:26

## 2017-01-01 RX ADMIN — ALLOPURINOL 300 MG: 300 TABLET ORAL at 08:51

## 2017-01-01 RX ADMIN — ASPIRIN 81 MG CHEWABLE TABLET 81 MG: 81 TABLET CHEWABLE at 14:11

## 2017-01-01 RX ADMIN — FERROUS GLUCONATE 324 MG: 324 TABLET ORAL at 08:56

## 2017-01-01 RX ADMIN — FLUTICASONE FUROATE AND VILANTEROL TRIFENATATE 1 PUFF: 200; 25 POWDER RESPIRATORY (INHALATION) at 08:54

## 2017-01-01 RX ADMIN — ALLOPURINOL 300 MG: 300 TABLET ORAL at 14:14

## 2017-01-01 RX ADMIN — SENNOSIDES AND DOCUSATE SODIUM 2 TABLET: 8.6; 5 TABLET ORAL at 09:33

## 2017-01-01 RX ADMIN — IPRATROPIUM BROMIDE AND ALBUTEROL SULFATE 3 ML: .5; 3 SOLUTION RESPIRATORY (INHALATION) at 12:38

## 2017-01-01 RX ADMIN — Medication 0.5 MG: at 19:51

## 2017-01-01 RX ADMIN — LISINOPRIL 2.5 MG: 2.5 TABLET ORAL at 14:12

## 2017-01-01 RX ADMIN — IPRATROPIUM BROMIDE AND ALBUTEROL SULFATE 3 ML: .5; 3 SOLUTION RESPIRATORY (INHALATION) at 03:44

## 2017-01-01 RX ADMIN — Medication 0.5 MG/HR: at 14:39

## 2017-01-01 RX ADMIN — Medication 12.5 MG: at 22:05

## 2017-01-01 RX ADMIN — SENNOSIDES AND DOCUSATE SODIUM 2 TABLET: 8.6; 5 TABLET ORAL at 19:59

## 2017-01-01 RX ADMIN — PIPERACILLIN SODIUM,TAZOBACTAM SODIUM 4.5 G: 4; .5 INJECTION, POWDER, FOR SOLUTION INTRAVENOUS at 18:14

## 2017-01-01 RX ADMIN — MULTIPLE VITAMINS W/ MINERALS TAB 1 TABLET: TAB at 08:57

## 2017-01-01 RX ADMIN — PIPERACILLIN SODIUM,TAZOBACTAM SODIUM 4.5 G: 4; .5 INJECTION, POWDER, FOR SOLUTION INTRAVENOUS at 12:41

## 2017-01-01 RX ADMIN — FERROUS GLUCONATE 324 MG: 324 TABLET ORAL at 19:58

## 2017-01-01 RX ADMIN — GUAIFENESIN 600 MG: 600 TABLET, EXTENDED RELEASE ORAL at 09:42

## 2017-01-01 RX ADMIN — SENNOSIDES AND DOCUSATE SODIUM 1 TABLET: 8.6; 5 TABLET ORAL at 20:25

## 2017-01-01 RX ADMIN — GUAIFENESIN 600 MG: 600 TABLET, EXTENDED RELEASE ORAL at 08:58

## 2017-01-01 RX ADMIN — POTASSIUM CHLORIDE 20 MEQ: 750 TABLET, EXTENDED RELEASE ORAL at 08:57

## 2017-01-01 RX ADMIN — Medication 0.5 MG/HR: at 08:53

## 2017-01-01 RX ADMIN — ALLOPURINOL 300 MG: 300 TABLET ORAL at 09:00

## 2017-01-01 RX ADMIN — BUMETANIDE 1 MG: 0.25 INJECTION, SOLUTION INTRAMUSCULAR; INTRAVENOUS at 14:39

## 2017-01-01 RX ADMIN — PIPERACILLIN SODIUM,TAZOBACTAM SODIUM 4.5 G: 4; .5 INJECTION, POWDER, FOR SOLUTION INTRAVENOUS at 12:34

## 2017-01-01 RX ADMIN — MAGNESIUM OXIDE TAB 400 MG (241.3 MG ELEMENTAL MG) 400 MG: 400 (241.3 MG) TAB at 09:33

## 2017-01-01 RX ADMIN — FERROUS GLUCONATE 324 MG: 324 TABLET ORAL at 19:45

## 2017-01-01 RX ADMIN — GUAIFENESIN 600 MG: 600 TABLET, EXTENDED RELEASE ORAL at 19:58

## 2017-01-01 RX ADMIN — LEVOTHYROXINE SODIUM 175 MCG: 100 TABLET ORAL at 14:13

## 2017-01-01 RX ADMIN — Medication 10 MG: at 23:59

## 2017-01-01 RX ADMIN — POLYETHYLENE GLYCOL 3350 17 G: 17 POWDER, FOR SOLUTION ORAL at 08:11

## 2017-01-01 RX ADMIN — OYSTER SHELL CALCIUM WITH VITAMIN D 1 TABLET: 500; 200 TABLET, FILM COATED ORAL at 18:27

## 2017-01-01 RX ADMIN — POTASSIUM CHLORIDE 20 MEQ: 750 TABLET, EXTENDED RELEASE ORAL at 09:34

## 2017-01-01 RX ADMIN — ASPIRIN 81 MG CHEWABLE TABLET 81 MG: 81 TABLET CHEWABLE at 08:13

## 2017-01-01 RX ADMIN — ENOXAPARIN SODIUM 40 MG: 40 INJECTION SUBCUTANEOUS at 09:34

## 2017-01-01 RX ADMIN — MAGNESIUM OXIDE TAB 400 MG (241.3 MG ELEMENTAL MG) 400 MG: 400 (241.3 MG) TAB at 08:56

## 2017-01-01 RX ADMIN — PIPERACILLIN SODIUM,TAZOBACTAM SODIUM 4.5 G: 4; .5 INJECTION, POWDER, FOR SOLUTION INTRAVENOUS at 13:05

## 2017-01-01 RX ADMIN — IPRATROPIUM BROMIDE AND ALBUTEROL SULFATE 3 ML: .5; 3 SOLUTION RESPIRATORY (INHALATION) at 23:48

## 2017-01-01 RX ADMIN — UMECLIDINIUM 1 PUFF: 62.5 AEROSOL, POWDER ORAL at 09:45

## 2017-01-01 RX ADMIN — LEVOTHYROXINE SODIUM 175 MCG: 100 TABLET ORAL at 08:56

## 2017-01-01 RX ADMIN — LISINOPRIL 2.5 MG: 2.5 TABLET ORAL at 09:00

## 2017-01-01 RX ADMIN — Medication 2 MG: at 03:54

## 2017-01-01 RX ADMIN — LEVOTHYROXINE SODIUM 175 MCG: 100 TABLET ORAL at 08:51

## 2017-01-01 RX ADMIN — PIPERACILLIN SODIUM,TAZOBACTAM SODIUM 4.5 G: 4; .5 INJECTION, POWDER, FOR SOLUTION INTRAVENOUS at 00:09

## 2017-01-01 RX ADMIN — PIPERACILLIN SODIUM,TAZOBACTAM SODIUM 4.5 G: 4; .5 INJECTION, POWDER, FOR SOLUTION INTRAVENOUS at 06:01

## 2017-01-01 RX ADMIN — ALBUTEROL SULFATE 2 PUFF: 90 AEROSOL, METERED RESPIRATORY (INHALATION) at 08:07

## 2017-01-01 ASSESSMENT — ENCOUNTER SYMPTOMS
SHORTNESS OF BREATH: 1
HEADACHES: 0
ABDOMINAL PAIN: 0
UNEXPECTED WEIGHT CHANGE: 1
WEAKNESS: 1
STRIDOR: 0
CHILLS: 0
FEVER: 0
CONFUSION: 0
ARTHRALGIAS: 0
EYE REDNESS: 0
NAUSEA: 0
SEIZURES: 0
FATIGUE: 1
VOMITING: 0
NECK STIFFNESS: 0
DYSPHORIC MOOD: 1
APPETITE CHANGE: 0
BRUISES/BLEEDS EASILY: 0
COUGH: 1
ACTIVITY CHANGE: 1
WHEEZING: 0
VOICE CHANGE: 0
FLANK PAIN: 0
FEVER: 0
COUGH: 1
DIARRHEA: 0
WOUND: 1
DECREASED CONCENTRATION: 1
SHORTNESS OF BREATH: 1
TROUBLE SWALLOWING: 0
RHINORRHEA: 0
VOMITING: 0
PALPITATIONS: 0
DIFFICULTY URINATING: 0
NAUSEA: 1
SINUS PRESSURE: 0
COLOR CHANGE: 1

## 2017-01-01 ASSESSMENT — PAIN DESCRIPTION - DESCRIPTORS
DESCRIPTORS: ACHING

## 2017-01-01 ASSESSMENT — PAIN SCALES - GENERAL
PAINLEVEL: NO PAIN (0)
PAINLEVEL: NO PAIN (0)

## 2017-01-05 NOTE — PROGRESS NOTES
"[facility]  \"Hello, my name is Grace Rabago , and I am calling from the [facility].  I want to check in and see how you are doing, after leaving the hospital.  You will also receive a call from your Care Coordinator (care team), but I want to make sure you don t have any urgent needs.  I have a couple questions to review with you:     Post-Discharge Outreach                                                    Scott Hebert is a 66 year old male     Date of Admission:                    12/19/2016  Date of Discharge:                    1/4/17  Admitting Physician:                  Delilah Mota MD  Discharge Physician:                 Tony Mccoy MD    Discharging Service:                  Internal Medicine, [place] [address]     Primary Provider: Scott Bryan           Reason for Admission:    Lethargy for 2 weeks with abnormal hemoglobin at TCU           Discharge Diagnosis:    GIB  Acute on chronic systolic heart failure  Acute on chronic anemia  Acute urinary retention  CKD3  COPD, VANDA  Hypothyroidism 2/2 GARRETT tx for Grave's disease  Paroxysmal AF  Chronic bilateral venous stasis dermatitis and wounds  Chronic constipation 2/2 methadone, opioids  Gout            Follow-up Appointments      Follow Up and recommended labs and tests        1. Follow up with intermediate physician.  The following labs/tests are recommended: CBC, BMP, Mg.  - Monitor electrolytes while on diuretics, sending patient on scheduled potassium    - Monitor hemoglobin levels     2. Patient will also need to follow up with GI for a colonoscopy in the next 1-2 weeks for further evaluation of chronic anemia.  3. Follow up with plastic surgery for management of left achilles tendon at the [facility] to manage these wounds.  He may be seen by either the podiatrist or the Plastic Surgery staff in clinic, whichever would allow him to be seen soonest.  4. Follow up in the [facility] " within 7 days for management of heart failure.                    Care Team:    Patient Care Team       Relationship Specialty Notifications Start End    Scott Bryan PCP - [facility]  1/9/15     Comment:  [facility] [address] [place] [address]     Phone: [phone] Fax: [phone]         [facility] [address] [place] [facility] [address]    Ellito Chisholm MD MD Cardiology  8/22/16     Phone: [phone] Fax: [phone]          [facility] [address] [place] [address]    Charlotte Jovel, RN Nurse Coordinator Cardiology Admissions 9/26/16     Phone: [phone] Pager: [phone] Fax: [phone]               Transition of Care Review                                                      Patient was called three times and no answer so post 24 hr DC follow up calls will be closed out      Plan                                                      Thanks for your time.  Your Care Coordinator will follow-up within the next couple days.  In the meantime if you have questions, concerns or problems call your care team.        Grace Rabago

## 2017-01-05 NOTE — TELEPHONE ENCOUNTER
MTM referral from: Transitions of Care (recent hospital discharge or ED visit)    MTM referral outreach attempt #1 on January 5, 2017 at 11:53 AM      Outcome: Patient is not interested at this time because he was discharged to TCU, will route to MTM Pharmacist/Provider as an FYI. Thank you for the referral.     Lazara Lawrence MTM Coordinator

## 2017-01-06 NOTE — PROGRESS NOTES
[place] [facility]  PRIMARY CARE PROVIDER AND CLINIC:  Scott Bryan [facility] [address] [facility] / [facility]*  Chief Complaint   Patient presents with     Hospital F/U       HPI:    Scott Hebert is a 66 year old  (1950),admitted to the [facility] from [facility].  Hospital stay 12/19/16 through 1/4/17.  Admitted to this facility for  rehab, medical management and nursing care.     Hospital Course Per [facility] Discharge Summary 1/4/17:    # Acute on chronic systolic heart failure (RENNY EF 40-45%)  # Ischemic cardiomyopathy  # Sever MR s/p MitraClip (10/11/2016)  At admission reported symptoms of fatigue could be attributed to volume overload from home home torsamide (50 mg day) being held at TCU for chronic blood pressure in the mid to upper 80s systolic. Admission exam notable for crackles, JVD and edema. [place] [address] and weight is up to 220 lbs (compare to 12/2 which was 173). Patient was initially unresponsive to intermittent doses of IV bumex and was started on a bumex drip at 2mg/hr with adequate diuresis.  Patient was transitioned to oral torsemide with metolazone once weight was <190 lbs, patient's weight remained stable between 186-189lbs on oral diuretics at time of discharge. Weight on day of discharge = 193 lbs.  - Continue 2L fluid restriction  - Continue torsemide 100mg twice daily; hold only if MAP <65; GIVE metolazone 5mg BID 30 minutes prior to torsemide 1/4/17  - Weigh patient daily, if weight increases more than 3lbs in two days or 5lbs in a week call [facility] clinic for adjustment of oral diuretics.  - Continue home chronic CHF med and risk factors modification; note, these medications should not be held for a systolic pressure in the mid to upper 80s as this is chronic for the patient, hold only if MAP is less than 65              Hydralazine 10 mg tid, Isosorbide dinitrate 20 mg tid (hold if  "MAP <65)              Isosorbide dinitrate 20mg TID (hold if MAP <65)              Metoprolol XL was decreased to 25 mg to allow after load reduction (hold if MAP <65)              ASA 81 mg qday              Atorvastatin 20 mg qday    # Acute on chronic anemia: Stable  Patient was admitted with Hb 6.9 which was about 1g lower from baseline that was initially thought to be dilutional. Chronic anemia is multifactorial with chronic disease and CKD3. But during the admission the patient continued to have slow drop in hemoglobin felt likely 2/2 iatrogenic (blood draws) in addition to portal hypertensive gastropathy that was noted on EGD that was completed with GI consultation on 12/23. Biopsies were obtained at that time that were negative for H. Pylori. Plan to follow up closely with GI as an outpatient for colonoscopy. Patient was transfused a total of 3u PRBC with stable hemoglobin for 48 hours prior to discharge.  - Follow up with GI for colonoscopy within one week of discharge.    # Acute left leg pain on chronic wound (chronic venous stasis dermatitis)  # Exposed achilles tendon; present on admission  Patient was noted to have lower extremity swelling with chronic lower extremity wounds, ultrasound was negative for DVT on 12/22. Plastic surgery was consulted for evaluation of exposed achilles tendon which was present on admission. Patient is not a candidate for graft, with recommendations to, \"keep the site of the Achilles tendon moist to prevent the tendon from further desiccation and would recommend treating this wound conservatively at this point.  It is unlikely that granulation tissue will form over the tendon, but we would to allow time to see if this would happen before any type of debridement.  His best chance of healing is to perform very good local wound cares and have close followup in an outpatient setting. \"  - Follow up at the [facility] to manage these wounds.  He may be seen by " "either the podiatrist or the Plastic Surgery staff in clinic, whichever would allow him to be seen soonest                           Chronic problems, Stable:  # CKD3  Baseline Cr ~ 2. At admission 1.6, may be component of dilutional from fluid overload. Creatinine at discharge stable with baseline at 1.9.  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 PFT 5/2016 \"moderate mixed obs/rest impairment\"  - Continued home dulera 2 tuff bid + tiotropium 18 mcg qday, albuterol prn  # Hypothyroidism 2/2 GARRETT tx for Grave's disease (according to the patient)  Continue home levothyroxine 175 mcg. Will d/c T3 given no indication + per endo note 11/4 to hold liothyronine  - Follow up TSH, T4 at 1 month after discharge.  # Chronic hypotension: SBP ~  mmHg without symptoms.  # Paroxysmal AF, high CHADVASc with high risk bleeding : continued home amiodarone 200 mg and ASA 81 mg  # Chronic bilateral LE wounds : Continue wound cares.  # DM2 : Discontinued home lantus 10u due to persistent low blood sugars off of all insulin.  # Chronic leg pain : Continue home methadone, 10 mg tid, oxycodone 5 mg bid prn, gabapentin 200 mg qhs  # Chronic constipation likely 2/2 outpatient opioids : senna, miralax and metoclopramide  # Gout: allopurinol 300 qday  # Anxiety and sleep problems : continued home melatonin, resume home lorazepam 0.5 mg q8 prn    Resolved problems    # Oral aphthous ulcers: noted on previous admission. Pt has no pain currently  # Recent esophageal candidiasis: EGD 11/3 showed esophageal candidiasis, finished fluconazole        Current issues are:      Gastrointestinal hemorrhage, unspecified gastrointestinal hemorrhage type  See above. Denies dark or bloody stools. Denies dyspepsia    Acute on chronic systolic congestive heart failure (H)  Weight today 189 lbs. He would prefer to see it at 185 lbs. Ongoing edema. No " SOB.    Anemia, unspecified type  HEMOGLOBIN   Date Value Ref Range Status   01/04/2017 8.0* 13.3 - 17.7 g/dL Final   01/03/2017 8.3* 13.3 - 17.7 g/dL Final   11/13/2008 11.5* 13.2 - 17.1 g/dL        Urinary retention  Voiding well currently. Denies dysuria, hematuria, retention.    CKD (chronic kidney disease) stage 3, GFR 30-59 ml/min  CREATININE   Date Value Ref Range Status   01/04/2017 1.97* 0.66 - 1.25 mg/dL Final       Chronic obstructive pulmonary disease, unspecified COPD type (H)  Due to nerve damage during an ablation about 6 years ago. Disease has been stable since then, no progression. On chronic oxygen.    Hypothyroidism, unspecified type  TSH   Date Value Ref Range Status   11/29/2016 4.19* 0.40 - 4.00 mU/L Final   Current regimen Levothyroxine 175mcg po qday.    Paroxysmal atrial fibrillation (H)  HR .    Chronic venous stasis dermatitis of both lower extremities  Several open areas currently. Wound nurse at [facility] is monitoring    Chronic constipation  Bowels moving well currently    Physical deconditioning  Severe, unable to walk at this time. He wishes he could have more therapy on a daily basis. His goal is to return to his house if possible. He lives alone, has stairs.    Type 2 diabetes mellitus with other circulatory complication (H)  On sliding scale only  Last BG Levels:    AM: 89, 325    Noon: 145    Dinner: 101    HS: 111, 148      Hypotension, unspecified hypotension type  BP readings range:  90/57  108/60  101/58  96/55  110/61  134/64  101/52  122/57        CODE STATUS/ADVANCE DIRECTIVES DISCUSSION:   CPR/Full code   Patient's living condition: lives alone    ALLERGIES:Transfusion (informational only) and Erythromycin  PAST MEDICAL HISTORY:  has a past medical history of Thyrotoxicosis without mention of goiter or other cause, without mention of thyrotoxic crisis or storm; Pain in limb; Pneumonia (2/24/2009); OTHER LUNG DISEASE NEC; Diabetes mellitus (H); CHF exacerbation (H);  Atrial fibrillation (H); Brain aneurysm (12/2/2010); Cerebral aneurysm (7/20/2010); Other specified forms of chronic ischemic heart disease; Unspecified Peripheral Vascular Disease (9/6/2007); Cor athrscl-uns vessel; PVCs (premature ventricular contractions) (12/2/2010); Abdominal aneurysm without mention of rupture (10/12/2005); Chronic pain (10/12/2005); CKD (chronic kidney disease) stage 3, GFR 30-59 ml/min (6/15/2011); Community acquired bacterial pneumonia (11/7/2011); Type 2 diabetes, HbA1C goal < 8% (H) (3/2/2011); VANDA-Severe (AHI 67) (2/16/2009); COPD (chronic obstructive pulmonary disease) (H) (5/31/2011); Unspecified anemia (4/24/2008); Other specified acquired hypothyroidism; MRSA; and Obese.  PAST SURGICAL HISTORY:  has past surgical history that includes c bypass graft othr,aortofemoral (2000); surgical history of -  (11/07); hernia repair, incisional (3-6-09); ABLATION SUPRAVENT ARRHYTHMOGENIC FOCUS (6/2010 ); Colonoscopy (2/16/2011); Esophagoscopy, gastroscopy, duodenoscopy (EGD), combined (2/16/2011); Amputate toe(s) (8/6/2014); Incision and drainage finger, combined (Left, 12/12/2014); Amputate toe(s) (Right, 1/13/2015); Laryngoscopy with biopsy(ies) (N/A, 7/29/2016); Esophagoscopy (N/A, 7/29/2016); Percutaneous Mitral Valve Repair (N/A, 10/11/2016); Esophagoscopy, gastroscopy, duodenoscopy (EGD), combined (N/A, 11/3/2016); and Esophagoscopy, gastroscopy, duodenoscopy (EGD), combined (N/A, 12/23/2016).  FAMILY HISTORY: family history includes HEART DISEASE in his mother; Respiratory in his father.  SOCIAL HISTORY:  reports that he quit smoking about 7 years ago. His smoking use included Cigarettes. He has a 5 pack-year smoking history. He has never used smokeless tobacco. He reports that he does not drink alcohol or use illicit drugs.    Post Discharge Medication Reconciliation Status: discharge medications reconciled, continue medications without change.  Current Outpatient Prescriptions    Medication Sig Dispense Refill     metoprolol (TOPROL-XL) 25 MG 24 hr tablet Take 0.5 tablets (12.5 mg) by mouth daily Do not hold for systolic blood pressure, only hold for mean arterial pressure (MAP) < 65 or HR <60.   MAP = [(2x diastolic) + systolic] / 3 30 tablet      Melatonin 10 MG TABS tablet Take 1 tablet (10 mg) by mouth At Bedtime       methadone (DOLOPHINE) 10 MG tablet Take 1 tablet (10 mg) by mouth 3 times daily 90 tablet 0     oxyCODONE (ROXICODONE) 5 MG IR tablet Take 0.5 tablets (2.5 mg) by mouth 2 times daily as needed for moderate to severe pain (PRN for pain 7-10/10 in severity.) 14 tablet 0     acetaminophen (TYLENOL) 500 MG tablet Take 2 tablets (1,000 mg) by mouth every 6 hours as needed for mild pain or fever       aspirin 81 MG chewable tablet Take 1 tablet (81 mg) by mouth daily 36 tablet      magnesium oxide (MAG-OX) 400 MG tablet Take 1 tablet (400 mg) by mouth daily 30 tablet 3     isosorbide dinitrate (ISORDIL) 20 MG tablet Take 1 tablet (20 mg) by mouth 3 times daily (before meals) Do not hold for systolic blood pressure, only hold for mean arterial pressure (MAP) < 65.   MAP = [(2x diastolic) + systolic] / 3       amiodarone (PACERONE/CODARONE) 200 MG tablet Take 1 tablet (200 mg) by mouth daily 60 tablet 3     albuterol (PROAIR HFA/PROVENTIL HFA/VENTOLIN HFA) 108 (90 BASE) MCG/ACT Inhaler Inhale 2 puffs into the lungs every 6 hours as needed for shortness of breath / dyspnea or wheezing 1 Inhaler 0     mometasone-formoterol (DULERA) 200-5 MCG/ACT oral inhaler Inhale 2 puffs into the lungs 2 times daily       tiotropium (SPIRIVA) 18 MCG capsule Inhale 1 capsule (18 mcg) into the lungs daily 30 capsule      gabapentin (NEURONTIN) 100 MG capsule Take 2 capsules (200 mg) by mouth At Bedtime 90 capsule 3     insulin aspart (NOVOLOG PEN) 100 UNIT/ML injection Inject 1-3 Units Subcutaneous 3 times daily (before meals) Do Not give Correction Insulin if Pre-Meal BG less than 140. For  Pre-Meal  - 239 give 1 unit. For Pre-Meal  - 339 give 2 units. For Pre-Meal BG greater than or equal to 340 give 3 units. To be given with prandial insulin, and based on pre-meal blood glucose. Notify provider if glucose greater than or equal to 350 mg/dL after administration of correction dose.       insulin aspart (NOVOLOG PEN) 100 UNIT/ML injection Inject 1-3 Units Subcutaneous At Bedtime Do Not give Bedtime Correction Insulin if BG less than 200. For  - 299 give 1 unit. For  - 399 give 2 units For BG greater than or equal 400 give 3 units. Notify provider if glucose greater than or equal to 350 mg/dL after administration of correction dose.       atorvastatin (LIPITOR) 20 MG tablet Take 1 tablet (20 mg) by mouth daily 30 tablet 3     hydrALAZINE (APRESOLINE) 10 MG tablet Take 1 tablet (10 mg) by mouth 3 times daily Take 1 tablet (20 mg) by mouth 3 times daily (before meals) Do not hold for systolic blood pressure, only hold for mean arterial pressure (MAP) < 65.   MAP = [(2x diastolic) + systolic] / 3 120 tablet      guaiFENesin (MUCINEX) 600 MG 12 hr tablet Take 1 tablet (600 mg) by mouth 2 times daily 28 tablet 3     sodium chloride (OCEAN) 0.65 % nasal spray Spray 2 sprays into both nostrils every hour as needed for congestion 1 Bottle 0     lidocaine (LIDODERM) 5 % Patch Place 1 patch onto the skin every 24 hours To chest for for chest pain. 30 patch      torsemide (DEMADEX) 100 MG tablet Take 1 tablet (100 mg) by mouth 2 times daily Do not hold for systolic blood pressure, only hold for mean arterial pressure (MAP) < 65.   MAP = [(2x diastolic) + systolic] / 3       allopurinol (ZYLOPRIM) 300 MG tablet Take 1 tablet (300 mg) by mouth daily 30 tablet 3     ferrous gluconate (FERGON) 324 (38 FE) MG tablet Take 1 tablet (324 mg) by mouth 2 times daily 30 tablet 3     bisacodyl (DULCOLAX) 5 MG EC tablet Take 1 tablet (5 mg) by mouth daily 60 tablet      bisacodyl (DULCOLAX) 10 MG  Suppository Place 1 suppository (10 mg) rectally daily as needed for constipation 30 suppository      polyethylene glycol (MIRALAX) powder Take 17 g by mouth 2 times daily 119 g      senna-docusate (SENOKOT-S;PERICOLACE) 8.6-50 MG per tablet Take 2 tablets by mouth 2 times daily Hold for diarrhea. 100 tablet      calcium carb 1250 mg, 500 mg [place],/vitamin D 200 units (OSCAL WITH D) 500-200 MG-UNIT per tablet Take 1 tablet by mouth 2 times daily (with meals) 60 tablet 3     potassium chloride SA (K-DUR/KLOR-CON M) 20 MEQ CR tablet Take 2 tablets (40 mEq) by mouth 2 times daily 90 tablet      multivitamin, therapeutic with minerals (MULTI-VITAMIN) TABS tablet Take 1 tablet by mouth daily 30 each 0     levothyroxine (SYNTHROID/LEVOTHROID) 175 MCG tablet Take 1 tablet (175 mcg) by mouth every morning (before breakfast) 30 tablet      pantoprazole (PROTONIX) 40 MG EC tablet Take 1 tablet (40 mg) by mouth every morning 30 tablet      order for DME Equipment being ordered: Oxygen 2-3L/min 1 Can 0     order for DME Equipment being ordered: Xerofoam gauze 6x8 sheets.  For a total of 5 large wounds, daily change 90 each 1     order for DME Equipment being ordered: 4x4 gauze, sterile gauze 4 Box 1     order for DME Equipment being ordered: wound cleanser 1 Bottle 1     order for DME Equipment being ordered: Oxygen    Use 2-3 L via NC 1 Bottle 0     ORDER FOR DME Equipment being ordered: surgical shoe 1 Device 0     blood glucose monitoring (ACCU-CHEK MULTICLIX) lancets Use to test blood sugar 4 times daily or as directed. 1 Box prn     ORDER FOR DME BIPAP 20/11       ORDER FOR DME Blood pressure monitor device. 1 Device 1     Respiratory Therapy Supplies (NEBULIZER/TUBING/MOUTHPIECE) KIT 1 kit as needed. 1 kit 1     ORDER FOR DME Oxygen 2 lits/min continuous. From [facility] [place] 1 Device 1       ROS:  10 point ROS of systems including Constitutional, Eyes, Respiratory, Cardiovascular, Gastroenterology, Genitourinary, Integumentary,  Muscularskeletal, Psychiatric were all negative except for pertinent positives noted in my HPI.    Exam:  BP 90/57 mmHg  Pulse 77  Temp(Src) 97.6  F (36.4  C)  Resp 22  Wt 187 lb 3.2 oz (84.913 kg)  SpO2 95%   GENERAL APPEARANCE:  Alert, in no distress, oriented, thin  ENT:  Mouth and posterior oropharynx normal, moist mucous membranes, normal hearing acuity  EYES:  EOM, conjunctivae, lids, pupils and irises normal  NECK:  No adenopathy,masses or thyromegaly  RESP:  respiratory effort and palpation of chest normal, lungs clear to auscultation , no respiratory distress  CV:  Palpation and auscultation of heart done , regular rate and rhythm, no murmur, rub, or gallop, edema difficult to assess due to dressings, wraps  ABDOMEN:  normal bowel sounds, soft, nontender, no hepatosplenomegaly or other masses  SKIN:  open areas not viewed, has several small bruises covering both arms  PSYCH:  oriented X 3, normal insight, judgement and memory, affect and mood normal    Lab/Diagnostic data:    CBC RESULTS:   Recent Labs   Lab Test  01/04/17   0802  01/03/17   0902   WBC  7.4  7.2   RBC  2.76*  2.88*   HGB  8.0*  8.3*   HCT  26.7*  27.9*   MCV  97  97   MCH  29.0  28.8   MCHC  30.0*  29.7*   RDW  21.2*  21.1*   PLT  158  174       Last Basic Metabolic Panel:  Recent Labs   Lab Test  01/04/17   0802  01/03/17   0902   NA  133  134   POTASSIUM  3.9  3.6   CHLORIDE  93*  92*   АНДРЕЙ  8.8  8.9   CO2  34*  35*   BUN  64*  60*   CR  1.97*  1.83*   GLC  99  78       Liver Function Studies -   Recent Labs   Lab Test  12/19/16   1431  12/05/16   1059  12/04/16   0911   11/29/16   2108  11/13/08   PROTTOTAL  7.3   --    --    --   6.9   < >   --    ALBUMIN  1.9*   --    --    --   1.8*   < >   --    BILITOTAL  0.2   --   0.4   --   0.4   < >  0.3   ALKPHOS  249*  441*  480*   < >  612*   < >  63   AST  40   --    --    --   88*   < >  31   ALT  16   --    --    --   55   < >  35   BILIDIRECT   --    --    --    --    --    --   0.1     < > = values in this interval not displayed.       TSH   Date Value Ref Range Status   11/29/2016 4.19* 0.40 - 4.00 mU/L Final   11/09/2016 8.45* 0.40 - 4.00 mU/L Final       A1C      5.7   9/22/2016  A1C      5.6   7/19/2016            ASSESSMENT/PLAN:  (K92.2) Gastrointestinal hemorrhage, unspecified gastrointestinal hemorrhage type  (primary encounter diagnosis)  Comment: Due to gastropathy. No evidence of ongoing bleeding  Plan: Monitor Hgb. Monitor stools    (I50.23) Acute on chronic systolic congestive heart failure (H)  Comment: Chronic. Currently: compensated. It is unclear from the documentation whether he should still be on metolazone. It was held on 1/2/16 due to increased creatinine. It appears to have been ordered for 1/4/17 and then stopped. Will need to monitor weight and BMP and notify [facility] clinic.  Plan: Continue current medications: torsemide. Monitor BMP. Monitor weights q day. Call provider if greater than 2 pound gain from previous weight. Monitor for shortness of breath, wheezing, increasing lower extremity edema and change in activity tolerance.     (D64.9) Anemia, unspecified type  Comment: Due to slow blood loss from gastropathy. Improving  Plan: Monitor Hgb    (R33.9) Urinary retention  Comment: Not currently an issue  Plan: Monitor for s/sx retention    (N18.3) CKD (chronic kidney disease) stage 3, GFR 30-59 ml/min  Comment: Baseline creatinine around 1.9. Will need to monitor closely with any adjustment to diuretics.   Plan: Avoid nephrotoxic medications and adjust medications per renal function. Monitor renal function as needed.     (J44.9) Chronic obstructive pulmonary disease, unspecified COPD type (H)  Comment: Per history, this seems to be a stable, restrictive disease process. Currently controlled  Plan: Continue current POC with no changes at this time and adjustments as needed.    (E03.9) Hypothyroidism, unspecified type  Comment: Chronic condition being managed with  medications.  Plan: Continue current POC with no changes at this time and adjustments as needed.    (I48.0) Paroxysmal atrial fibrillation (H)  Comment: Rate controlled. On ASA only for anticoagulation due to risk of bleeding  Plan: Cont amiodarone, ASA, BB. Monitor HR.     (I83.11,  I83.12) Chronic venous stasis dermatitis of both lower extremities  Comment: With open areas. At risk for infection, will need to monitor closely  Plan: Wound nurse monitoring. F/u wound clinic    (K59.09) Chronic constipation  Comment: Chronic condition being managed with medications and is currently asymptomatic.  Plan: Continue current POC with no changes at this time and adjustments as needed.    (R53.81) Physical deconditioning  Comment: Severe due to acute illness on chronic debility. It is unclear at this point whether he will ever be stable to return to his home.  Plan: PT/OT eval and treat, discharge planning per their recommendations.    (E11.59) Type 2 diabetes mellitus with other circulatory complication (H)  Comment: BG variable. Unable to stop sliding scale at this time, but would hope to stop it in the future  Plan: Continue current POC with no changes at this time. Adjust medication as clinically indicated.    (I95.9) Hypotension, unspecified hypotension type  Comment: Chronic. Asymptomatic  Plan: Continue current POC with no changes at this time and adjustments as needed.        Orders:  CBC, BMP, Mg 1/9/17      Information reviewed:  Medications, vital signs, orders, nursing notes, problem list, hospital information.     Electronically signed by:  SHWETA Phan [facility]  Pager: [phone]

## 2017-01-10 NOTE — PROGRESS NOTES
[facility]    Chief Complaint   Patient presents with     RECHECK       HPI:    Scott Hebert is a 66 year old  (1950), who is being seen today for an episodic care visit at [facility]. Hospital stay was at [facility] from 12/19/16 through 1/4/17. He was admitted for acute CHF and GI bleed related to gastropathy. Admitted to this facility for  rehab, medical management and nursing care.      Today's concern is:  Gastrointestinal hemorrhage, unspecified gastrointestinal hemorrhage type  See above. Denies dark or bloody stools. Denies dyspepsia    Acute on chronic systolic congestive heart failure (H)  Weight today 187 lbs. Goal weight is 186-188 lbs. Ongoing edema. No SOB. He says he tries to follow the fluid restriction. He also put a salt packet on his hot cereal during visit.    Anemia, unspecified type  HEMOGLOBIN   Date Value Ref Range Status   01/04/2017 8.0* 13.3 - 17.7 g/dL Final   01/03/2017 8.3* 13.3 - 17.7 g/dL Final   11/13/2008 11.5* 13.2 - 17.1 g/dL        Urinary retention  Voiding well currently. Denies dysuria, hematuria, retention.    CKD (chronic kidney disease) stage 3, GFR 30-59 ml/min  Baseline creatinine around 2  CREATININE   Date Value Ref Range Status   01/09/2017 2.18* 0.70 - 1.30 mg/dL Final       Chronic obstructive pulmonary disease, unspecified COPD type (H)  Due to nerve damage during an ablation about 6 years ago. Disease has been stable since then, no progression. On chronic oxygen.    Paroxysmal atrial fibrillation (H)  HR .    Chronic venous stasis dermatitis of both lower extremities  Several open areas currently. Wound nurse at [facility] is monitoring    Chronic constipation  Bowels moving well currently    Physical deconditioning  Severe, unable to walk at this time. His goal is to return to his house if possible. He lives alone, has stairs.    Type 2 diabetes mellitus with other circulatory  complication (H)  On sliding scale only  Last BG Levels:    AM: 325, 90, 106, 91,     Noon: 147, 129, 133, 81,     Dinner: 171, 108, 138, 106    HS: 148, 131, 139, 133    A1C      5.7   9/22/2016  A1C      5.6   7/19/2016      Hypotension, unspecified hypotension type  BP readings range:  104/52  130/68  89/51  87/51  97/49  85/47  77/44  137/72  90/57  108/60      Hyponatremia  Na 129 on 1/9/17. Of note, his weight was up to 190 lbs that day as well. He had noted that he didn't void very much the night prior to this weight and labs. Now last evening he voided a large amount       ALLERGIES: Transfusion (informational only) and Erythromycin  Past Medical, Surgical, Family and Social History reviewed and updated in "[employer]    Current Outpatient Prescriptions   Medication Sig Dispense Refill     HYDRALAZINE HCL PO Take 10 mg by mouth 3 times daily Do not hold for systolic blood pressure, only hold for MAP < 65. MAP=[(2x diastolic)+systolic]/3.       morphine 0.1% in solosite topical gel Place onto the skin 4 times daily as needed Apply 1 gm topically to LLE for pain four times daily as needed       metoprolol (TOPROL-XL) 25 MG 24 hr tablet Take 0.5 tablets (12.5 mg) by mouth daily Do not hold for systolic blood pressure, only hold for mean arterial pressure (MAP) < 65 or HR <60.   MAP = [(2x diastolic) + systolic] / 3 30 tablet      Melatonin 10 MG TABS tablet Take 1 tablet (10 mg) by mouth At Bedtime       methadone (DOLOPHINE) 10 MG tablet Take 1 tablet (10 mg) by mouth 3 times daily 90 tablet 0     oxyCODONE (ROXICODONE) 5 MG IR tablet Take 0.5 tablets (2.5 mg) by mouth 2 times daily as needed for moderate to severe pain (PRN for pain 7-10/10 in severity.) 14 tablet 0     acetaminophen (TYLENOL) 500 MG tablet Take 2 tablets (1,000 mg) by mouth every 6 hours as needed for mild pain or fever       aspirin 81 MG chewable tablet Take 1 tablet (81 mg) by mouth daily 36 tablet      magnesium oxide (MAG-OX) 400 MG tablet  Take 1 tablet (400 mg) by mouth daily 30 tablet 3     isosorbide dinitrate (ISORDIL) 20 MG tablet Take 1 tablet (20 mg) by mouth 3 times daily (before meals) Do not hold for systolic blood pressure, only hold for mean arterial pressure (MAP) < 65.   MAP = [(2x diastolic) + systolic] / 3       amiodarone (PACERONE/CODARONE) 200 MG tablet Take 1 tablet (200 mg) by mouth daily 60 tablet 3     albuterol (PROAIR HFA/PROVENTIL HFA/VENTOLIN HFA) 108 (90 BASE) MCG/ACT Inhaler Inhale 2 puffs into the lungs every 6 hours as needed for shortness of breath / dyspnea or wheezing 1 Inhaler 0     mometasone-formoterol (DULERA) 200-5 MCG/ACT oral inhaler Inhale 2 puffs into the lungs 2 times daily       tiotropium (SPIRIVA) 18 MCG capsule Inhale 1 capsule (18 mcg) into the lungs daily 30 capsule      gabapentin (NEURONTIN) 100 MG capsule Take 2 capsules (200 mg) by mouth At Bedtime 90 capsule 3     insulin aspart (NOVOLOG PEN) 100 UNIT/ML injection Inject 1-3 Units Subcutaneous 3 times daily (before meals) Do Not give Correction Insulin if Pre-Meal BG less than 140. For Pre-Meal  - 239 give 1 unit. For Pre-Meal  - 339 give 2 units. For Pre-Meal BG greater than or equal to 340 give 3 units. To be given with prandial insulin, and based on pre-meal blood glucose. Notify provider if glucose greater than or equal to 350 mg/dL after administration of correction dose.       insulin aspart (NOVOLOG PEN) 100 UNIT/ML injection Inject 1-3 Units Subcutaneous At Bedtime Do Not give Bedtime Correction Insulin if BG less than 200. For  - 299 give 1 unit. For  - 399 give 2 units For BG greater than or equal 400 give 3 units. Notify provider if glucose greater than or equal to 350 mg/dL after administration of correction dose.       atorvastatin (LIPITOR) 20 MG tablet Take 1 tablet (20 mg) by mouth daily 30 tablet 3     guaiFENesin (MUCINEX) 600 MG 12 hr tablet Take 1 tablet (600 mg) by mouth 2 times daily 28 tablet 3      sodium chloride (OCEAN) 0.65 % nasal spray Spray 2 sprays into both nostrils every hour as needed for congestion 1 Bottle 0     lidocaine (LIDODERM) 5 % Patch Place 1 patch onto the skin every 24 hours To chest for for chest pain. 30 patch      torsemide (DEMADEX) 100 MG tablet Take 1 tablet (100 mg) by mouth 2 times daily Do not hold for systolic blood pressure, only hold for mean arterial pressure (MAP) < 65.   MAP = [(2x diastolic) + systolic] / 3       allopurinol (ZYLOPRIM) 300 MG tablet Take 1 tablet (300 mg) by mouth daily 30 tablet 3     ferrous gluconate (FERGON) 324 (38 FE) MG tablet Take 1 tablet (324 mg) by mouth 2 times daily 30 tablet 3     bisacodyl (DULCOLAX) 5 MG EC tablet Take 1 tablet (5 mg) by mouth daily 60 tablet      bisacodyl (DULCOLAX) 10 MG Suppository Place 1 suppository (10 mg) rectally daily as needed for constipation 30 suppository      polyethylene glycol (MIRALAX) powder Take 17 g by mouth 2 times daily 119 g      senna-docusate (SENOKOT-S;PERICOLACE) 8.6-50 MG per tablet Take 2 tablets by mouth 2 times daily Hold for diarrhea. 100 tablet      calcium carb 1250 mg, 500 mg [place],/vitamin D 200 units (OSCAL WITH D) 500-200 MG-UNIT per tablet Take 1 tablet by mouth 2 times daily (with meals) 60 tablet 3     potassium chloride SA (K-DUR/KLOR-CON M) 20 MEQ CR tablet Take 2 tablets (40 mEq) by mouth 2 times daily 90 tablet      multivitamin, therapeutic with minerals (MULTI-VITAMIN) TABS tablet Take 1 tablet by mouth daily 30 each 0     levothyroxine (SYNTHROID/LEVOTHROID) 175 MCG tablet Take 1 tablet (175 mcg) by mouth every morning (before breakfast) 30 tablet      pantoprazole (PROTONIX) 40 MG EC tablet Take 1 tablet (40 mg) by mouth every morning 30 tablet      order for DME Equipment being ordered: Oxygen 2-3L/min 1 Can 0     order for DME Equipment being ordered: Xerofoam gauze 6x8 sheets.  For a total of 5 large wounds, daily change 90 each 1     order for DME Equipment being ordered:  4x4 gauze, sterile gauze 4 Box 1     order for DME Equipment being ordered: wound cleanser 1 Bottle 1     order for DME Equipment being ordered: Oxygen    Use 2-3 L via NC 1 Bottle 0     ORDER FOR DME Equipment being ordered: surgical shoe 1 Device 0     blood glucose monitoring (ACCU-CHEK MULTICLIX) lancets Use to test blood sugar 4 times daily or as directed. 1 Box prn     ORDER FOR DME BIPAP 20/11       ORDER FOR DME Blood pressure monitor device. 1 Device 1     Respiratory Therapy Supplies (NEBULIZER/TUBING/MOUTHPIECE) KIT 1 kit as needed. 1 kit 1     ORDER FOR DME Oxygen 2 lits/min continuous. From [employer] 1 Device 1       REVIEW OF SYSTEMS:  10 point ROS of systems including Constitutional, Eyes, Respiratory, Cardiovascular, Gastroenterology, Genitourinary, Integumentary, Muscularskeletal, Psychiatric were all negative except for pertinent positives noted in my HPI.    Physical Exam:  /52 mmHg  Pulse 77  Temp(Src) 97.7  F (36.5  C)  Resp 18  Wt 187 lb (84.823 kg)  SpO2 100%   GENERAL APPEARANCE:  Alert, in no distress, oriented, thin  ENT:  Mouth and posterior oropharynx normal, moist mucous membranes, normal hearing acuity  EYES:  EOM, conjunctivae, lids, pupils and irises normal  NECK:  No adenopathy,masses or thyromegaly  RESP:  respiratory effort and palpation of chest normal, lungs clear to auscultation , no respiratory distress  CV:  Palpation and auscultation of heart done , regular rate and rhythm, no murmur, rub, or gallop, edema difficult to assess due to dressings, wraps  ABDOMEN:  normal bowel sounds, soft, nontender, no hepatosplenomegaly or other masses  SKIN:  open areas not viewed, has several small bruises covering both arms  PSYCH:  oriented X 3, normal insight, judgement and memory, affect and mood normal      Recent Labs:      CBC RESULTS:   Recent Labs   Lab Test  01/04/17   0802  01/03/17   0902   WBC  7.4  7.2   RBC  2.76*  2.88*   HGB  8.0*  8.3*   HCT  26.7*  27.9*   MCV  97   97   MCH  29.0  28.8   MCHC  30.0*  29.7*   RDW  21.2*  21.1*   PLT  158  174       Last Basic Metabolic Panel:  Recent Labs   Lab Test  01/04/17   0802  01/03/17   0902   NA  133  134   POTASSIUM  3.9  3.6   CHLORIDE  93*  92*   АНДРЕЙ  8.8  8.9   CO2  34*  35*   BUN  64*  60*   CR  1.97*  1.83*   GLC  99  78       Liver Function Studies -   Recent Labs   Lab Test  12/19/16   1431  12/05/16   1059  12/04/16   0911   11/29/16   2108  11/13/08   PROTTOTAL  7.3   --    --    --   6.9   < >   --    ALBUMIN  1.9*   --    --    --   1.8*   < >   --    BILITOTAL  0.2   --   0.4   --   0.4   < >  0.3   ALKPHOS  249*  441*  480*   < >  612*   < >  63   AST  40   --    --    --   88*   < >  31   ALT  16   --    --    --   55   < >  35   BILIDIRECT   --    --    --    --    --    --   0.1    < > = values in this interval not displayed.         A1C      5.7   9/22/2016  A1C      5.6   7/19/2016        Assessment/Plan:  (K92.2) Gastrointestinal hemorrhage, unspecified gastrointestinal hemorrhage type  (primary encounter diagnosis)  Comment: Due to gastropathy. No evidence of ongoing bleeding  Plan: Monitor Hgb. Monitor stools    (I50.23) Acute on chronic systolic congestive heart failure (H)  Comment: Chronic. Currently: compensated.  Plan: Continue current medications: torsemide. Monitor BMP. Monitor weights q day. Call provider if greater than 2 pound gain from previous weight. Monitor for shortness of breath, wheezing, increasing lower extremity edema and change in activity tolerance.     (D64.9) Anemia, unspecified type  Comment: Due to slow blood loss from gastropathy. Improving  Plan: Monitor Hgb    (R33.9) Urinary retention  Comment: Not currently an issue  Plan: Monitor for s/sx retention    (N18.3) CKD (chronic kidney disease) stage 3, GFR 30-59 ml/min  Comment: At baseline. Will need to monitor closely with any adjustment to diuretics.   Plan: Avoid nephrotoxic medications and adjust medications per renal function.  Monitor renal function as needed.     (J44.9) Chronic obstructive pulmonary disease, unspecified COPD type (H)  Comment: Per history, this seems to be a stable, restrictive disease process. Currently controlled  Plan: Continue current POC with no changes at this time and adjustments as needed.    (I48.0) Paroxysmal atrial fibrillation (H)  Comment: Rate controlled. On ASA only for anticoagulation due to risk of bleeding  Plan: Cont amiodarone, ASA, BB. Monitor HR.     (I83.11,  I83.12) Chronic venous stasis dermatitis of both lower extremities  Comment: With open areas. At risk for infection, will need to monitor closely  Plan: Wound nurse monitoring. F/u wound clinic    (K59.09) Chronic constipation  Comment: Chronic condition being managed with medications and is currently asymptomatic.  Plan: Continue current POC with no changes at this time and adjustments as needed.    (R53.81) Physical deconditioning  Comment: Severe due to acute illness on chronic debility. It is unclear at this point whether he will ever be stable to return to his home.  Plan: PT/OT eval and treat, discharge planning per their recommendations.    (E11.59) Type 2 diabetes mellitus with other circulatory complication (H)  Comment: BG variable. Unable to stop sliding scale at this time, but would hope to stop it in the future  Plan: Continue current POC with no changes at this time. Adjust medication as clinically indicated.    (I95.9) Hypotension, unspecified hypotension type  Comment: Chronic. Asymptomatic  Plan: Continue current POC with no changes at this time and adjustments as needed.    (E87.1) Hyponatremia  Comment: May have been related to his lack of diuresis the night before. May also be due to not following his fluid restriction.   Plan: Recheck BMP 1/12/17. If sodium still down, will again discuss fluid restriction, may need to remove pitcher from room      Orders:  Hgb, BMP 1/12/17        Electronically signed by  Yasmin Eddy  APRN CNP  [facility]  Pager: [phone]

## 2017-01-10 NOTE — PROGRESS NOTES
[place] [facility]  PRIMARY CARE PROVIDER AND CLINIC:  [facility] [address] [facility] / [facility] [address]*        Pt seen by Dr Lizama on 1/7/17 for an initial TCU visit      HPI:    Scott Hebert is a 66 year old  (1950),admitted to the [facility] from [facility].  Hospital stay 12/19/16 through 1/4/17 for the treatment of acute CHF.      Admitted to this facility for  rehab, medical management and nursing care.       Hospital course reviewed by me, is as per the hospital d/c summary and NP note.    # Acute on chronic systolic heart failure (RENNY EF 40-45%)  # Ischemic cardiomyopathy  # Sever MR s/p MitraClip (10/11/2016)    At admission reported symptoms of fatigue could be attributed to volume overload from home home torsamide (50 mg day) being held at TCU for chronic blood pressure in the mid to upper 80s systolic. Admission exam notable for crackles, JVD and edema. [place] [address] and weight is up to 220 lbs (compare to 12/2 which was 173). Patient was initially unresponsive to intermittent doses of IV bumex and was started on a bumex drip at 2mg/hr with adequate diuresis.  Patient was transitioned to oral torsemide with metolazone once weight was <190 lbs, patient's weight remained stable between 186-189lbs on oral diuretics at time of discharge. Weight on day of discharge = 193 lbs.    Plan  - Continue 2L fluid restriction  - Continue torsemide 100mg twice daily; hold only if MAP <65; GIVE metolazone 5mg BID 30 minutes prior to torsemide 1/4/17  - Weigh patient daily, if weight increases more than 3lbs in two days or 5lbs in a week call [facility] for adjustment of oral diuretics.  - Continue home chronic CHF med and risk factors modification; note, these medications should not be held for a systolic pressure in the mid to upper 80s as this is chronic for the patient, hold only if MAP is less than  "65                Hydralazine 10 mg tid, Isosorbide dinitrate 20 mg tid (hold if MAP <65)              Isosorbide dinitrate 20mg TID (hold if MAP <65)              Metoprolol XL was decreased to 25 mg to allow after load reduction (hold if MAP <65)              ASA 81 mg qday              Atorvastatin 20 mg qday    # Acute on chronic anemia: Stable  Patient was admitted with Hb 6.9 which was about 1g lower from baseline that was initially thought to be dilutional. Chronic anemia is multifactorial with chronic disease and CKD3. But during the admission the patient continued to have slow drop in hemoglobin felt likely 2/2 iatrogenic (blood draws) in addition to portal hypertensive gastropathy that was noted on EGD that was completed with GI consultation on 12/23. Biopsies were obtained at that time that were negative for H. Pylori. Plan to follow up closely with GI as an outpatient for colonoscopy. Patient was transfused a total of 3u PRBC with stable hemoglobin for 48 hours prior to discharge.    PLAN  - Follow up with GI for colonoscopy within one week of discharge.    # Acute left leg pain on chronic wound (chronic venous stasis dermatitis)    # Exposed achilles tendon; present on admission  Patient was noted to have lower extremity swelling with chronic lower extremity wounds, ultrasound was negative for DVT on 12/22. Plastic surgery was consulted for evaluation of exposed achilles tendon which was present on admission. Patient is not a candidate for graft, with recommendations to, \"keep the site of the Achilles tendon moist to prevent the tendon from further desiccation and would recommend treating this wound conservatively at this point.  It is unlikely that granulation tissue will form over the tendon, but we would to allow time to see if this would happen before any type of debridement.  His best chance of healing is to perform very good local wound cares and have close followup in an outpatient setting. \"  - " "Follow up at the [facility] [place] to manage these wounds.  He may be seen by either the podiatrist or the Plastic Surgery staff in clinic, whichever would allow him to be seen soonest                           # CKD3  Baseline Cr ~ 2. At admission 1.6, may be component of dilutional from fluid overload. Creatinine at discharge stable with baseline at 1.9.    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 PFT 5/2016 \"moderate mixed obs/rest impairment\"  Plan:    - Continued home dulera 2 tuff bid + tiotropium 18 mcg qday, albuterol prn    # Hypothyroidism 2/2 GARRETT tx for Grave's disease (according to the patient)  Continue home levothyroxine 175 mcg.  Plan   repeat  TSH, T4 at 1 month after discharge.    # Chronic hypotension: SBP ~  mmHg without symptoms.    # Paroxysmal AF, high CHADVASc with high risk bleeding : continued home amiodarone 200 mg and ASA 81 mg    # Chronic bilateral LE wounds : Continue wound cares.    # DM2 : Discontinued home lantus 10u due to persistent low blood sugars off of all insulin.    # Chronic leg pain : Continue home methadone, 10 mg tid, oxycodone 5 mg bid prn, gabapentin 200 mg qhs    # Chronic constipation likely 2/2 outpatient opioids : senna, miralax and metoclopramide    Pt notes generalized weakness, dyspnea with minimal activity.  He is unable to walk secondary to weakness and LE pain  He denies chest pain, cough, abd pain, heartburn, melena, rectal bleeding  He is on chronic 02  LE pain is controlled  He is having regular stools    BG stable  BP 90s-100s/        CODE STATUS/ADVANCE DIRECTIVES DISCUSSION:   CPR/Full code   Patient's living condition: lives alone    ALLERGIES:Transfusion (informational only) and Erythromycin  PAST MEDICAL HISTORY:  has a past medical history of Thyrotoxicosis without mention of goiter or other cause, without mention of thyrotoxic crisis or " storm; Pain in limb; Pneumonia (2/24/2009); OTHER LUNG DISEASE NEC; Diabetes mellitus (H); CHF exacerbation (H); Atrial fibrillation (H); Brain aneurysm (12/2/2010); Cerebral aneurysm (7/20/2010); Other specified forms of chronic ischemic heart disease; Unspecified Peripheral Vascular Disease (9/6/2007); Cor athrscl-uns vessel; PVCs (premature ventricular contractions) (12/2/2010); Abdominal aneurysm without mention of rupture (10/12/2005); Chronic pain (10/12/2005); CKD (chronic kidney disease) stage 3, GFR 30-59 ml/min (6/15/2011); Community acquired bacterial pneumonia (11/7/2011); Type 2 diabetes, HbA1C goal < 8% (H) (3/2/2011); VANDA-Severe (AHI 67) (2/16/2009); COPD (chronic obstructive pulmonary disease) (H) (5/31/2011); Unspecified anemia (4/24/2008); Other specified acquired hypothyroidism; MRSA; and Obese.  PAST SURGICAL HISTORY:  has past surgical history that includes c bypass graft othr,aortofemoral (2000); surgical history of -  (11/07); hernia repair, incisional (3-6-09); ABLATION SUPRAVENT ARRHYTHMOGENIC FOCUS (6/2010 ); Colonoscopy (2/16/2011); Esophagoscopy, gastroscopy, duodenoscopy (EGD), combined (2/16/2011); Amputate toe(s) (8/6/2014); Incision and drainage finger, combined (Left, 12/12/2014); Amputate toe(s) (Right, 1/13/2015); Laryngoscopy with biopsy(ies) (N/A, 7/29/2016); Esophagoscopy (N/A, 7/29/2016); Percutaneous Mitral Valve Repair (N/A, 10/11/2016); Esophagoscopy, gastroscopy, duodenoscopy (EGD), combined (N/A, 11/3/2016); and Esophagoscopy, gastroscopy, duodenoscopy (EGD), combined (N/A, 12/23/2016).  FAMILY HISTORY: family history includes HEART DISEASE in his mother; Respiratory in his father.  SOCIAL HISTORY:  reports that he quit smoking about 7 years ago. His smoking use included Cigarettes. He has a 5 pack-year smoking history. He has never used smokeless tobacco. He reports that he does not drink alcohol or use illicit drugs.    Post Discharge Medication Reconciliation Status:      .  Current Outpatient Prescriptions   Medication Sig Dispense Refill     metoprolol (TOPROL-XL) 25 MG 24 hr tablet Take 0.5 tablets (12.5 mg) by mouth daily Do not hold for systolic blood pressure, only hold for mean arterial pressure (MAP) < 65 or HR <60.   MAP = [(2x diastolic) + systolic] / 3 30 tablet      Melatonin 10 MG TABS tablet Take 1 tablet (10 mg) by mouth At Bedtime       methadone (DOLOPHINE) 10 MG tablet Take 1 tablet (10 mg) by mouth 3 times daily 90 tablet 0     oxyCODONE (ROXICODONE) 5 MG IR tablet Take 0.5 tablets (2.5 mg) by mouth 2 times daily as needed for moderate to severe pain (PRN for pain 7-10/10 in severity.) 14 tablet 0     acetaminophen (TYLENOL) 500 MG tablet Take 2 tablets (1,000 mg) by mouth every 6 hours as needed for mild pain or fever       aspirin 81 MG chewable tablet Take 1 tablet (81 mg) by mouth daily 36 tablet      magnesium oxide (MAG-OX) 400 MG tablet Take 1 tablet (400 mg) by mouth daily 30 tablet 3     isosorbide dinitrate (ISORDIL) 20 MG tablet Take 1 tablet (20 mg) by mouth 3 times daily (before meals) Do not hold for systolic blood pressure, only hold for mean arterial pressure (MAP) < 65.   MAP = [(2x diastolic) + systolic] / 3       amiodarone (PACERONE/CODARONE) 200 MG tablet Take 1 tablet (200 mg) by mouth daily 60 tablet 3     albuterol (PROAIR HFA/PROVENTIL HFA/VENTOLIN HFA) 108 (90 BASE) MCG/ACT Inhaler Inhale 2 puffs into the lungs every 6 hours as needed for shortness of breath / dyspnea or wheezing 1 Inhaler 0     mometasone-formoterol (DULERA) 200-5 MCG/ACT oral inhaler Inhale 2 puffs into the lungs 2 times daily       tiotropium (SPIRIVA) 18 MCG capsule Inhale 1 capsule (18 mcg) into the lungs daily 30 capsule      gabapentin (NEURONTIN) 100 MG capsule Take 2 capsules (200 mg) by mouth At Bedtime 90 capsule 3     insulin aspart (NOVOLOG PEN) 100 UNIT/ML injection Inject 1-3 Units Subcutaneous 3 times daily (before meals) Do Not give Correction Insulin  if Pre-Meal BG less than 140. For Pre-Meal  - 239 give 1 unit. For Pre-Meal  - 339 give 2 units. For Pre-Meal BG greater than or equal to 340 give 3 units. To be given with prandial insulin, and based on pre-meal blood glucose. Notify provider if glucose greater than or equal to 350 mg/dL after administration of correction dose.       insulin aspart (NOVOLOG PEN) 100 UNIT/ML injection Inject 1-3 Units Subcutaneous At Bedtime Do Not give Bedtime Correction Insulin if BG less than 200. For  - 299 give 1 unit. For  - 399 give 2 units For BG greater than or equal 400 give 3 units. Notify provider if glucose greater than or equal to 350 mg/dL after administration of correction dose.       atorvastatin (LIPITOR) 20 MG tablet Take 1 tablet (20 mg) by mouth daily 30 tablet 3     hydrALAZINE (APRESOLINE) 10 MG tablet Take 1 tablet (10 mg) by mouth 3 times daily Take 1 tablet (20 mg) by mouth 3 times daily (before meals) Do not hold for systolic blood pressure, only hold for mean arterial pressure (MAP) < 65.   MAP = [(2x diastolic) + systolic] / 3 120 tablet      guaiFENesin (MUCINEX) 600 MG 12 hr tablet Take 1 tablet (600 mg) by mouth 2 times daily 28 tablet 3     sodium chloride (OCEAN) 0.65 % nasal spray Spray 2 sprays into both nostrils every hour as needed for congestion 1 Bottle 0     lidocaine (LIDODERM) 5 % Patch Place 1 patch onto the skin every 24 hours To chest for for chest pain. 30 patch      torsemide (DEMADEX) 100 MG tablet Take 1 tablet (100 mg) by mouth 2 times daily Do not hold for systolic blood pressure, only hold for mean arterial pressure (MAP) < 65.   MAP = [(2x diastolic) + systolic] / 3       allopurinol (ZYLOPRIM) 300 MG tablet Take 1 tablet (300 mg) by mouth daily 30 tablet 3     ferrous gluconate (FERGON) 324 (38 FE) MG tablet Take 1 tablet (324 mg) by mouth 2 times daily 30 tablet 3     bisacodyl (DULCOLAX) 5 MG EC tablet Take 1 tablet (5 mg) by mouth daily 60 tablet       bisacodyl (DULCOLAX) 10 MG Suppository Place 1 suppository (10 mg) rectally daily as needed for constipation 30 suppository      polyethylene glycol (MIRALAX) powder Take 17 g by mouth 2 times daily 119 g      senna-docusate (SENOKOT-S;PERICOLACE) 8.6-50 MG per tablet Take 2 tablets by mouth 2 times daily Hold for diarrhea. 100 tablet      calcium carb 1250 mg, 500 mg [place],/vitamin D 200 units (OSCAL WITH D) 500-200 MG-UNIT per tablet Take 1 tablet by mouth 2 times daily (with meals) 60 tablet 3     potassium chloride SA (K-DUR/KLOR-CON M) 20 MEQ CR tablet Take 2 tablets (40 mEq) by mouth 2 times daily 90 tablet      multivitamin, therapeutic with minerals (MULTI-VITAMIN) TABS tablet Take 1 tablet by mouth daily 30 each 0     levothyroxine (SYNTHROID/LEVOTHROID) 175 MCG tablet Take 1 tablet (175 mcg) by mouth every morning (before breakfast) 30 tablet      pantoprazole (PROTONIX) 40 MG EC tablet Take 1 tablet (40 mg) by mouth every morning 30 tablet      order for DME Equipment being ordered: Oxygen 2-3L/min 1 Can 0     order for DME Equipment being ordered: Xerofoam gauze 6x8 sheets.  For a total of 5 large wounds, daily change 90 each 1     order for DME Equipment being ordered: 4x4 gauze, sterile gauze 4 Box 1     order for DME Equipment being ordered: wound cleanser 1 Bottle 1     order for DME Equipment being ordered: Oxygen    Use 2-3 L via NC 1 Bottle 0     ORDER FOR DME Equipment being ordered: surgical shoe 1 Device 0     blood glucose monitoring (ACCU-CHEK MULTICLIX) lancets Use to test blood sugar 4 times daily or as directed. 1 Box prn     ORDER FOR DME BIPAP 20/11       ORDER FOR DME Blood pressure monitor device. 1 Device 1     Respiratory Therapy Supplies (NEBULIZER/TUBING/MOUTHPIECE) KIT 1 kit as needed. 1 kit 1     ORDER FOR DME Oxygen 2 lits/min continuous. From [facility] [place] 1 Device 1       ROS:  10 point ROS neg except as noted above.    Exam:     GENERAL APPEARANCE:  Alert, in no distress, very pale,  chronically ill appearing, wearing 02  ENT:  Mouth and posterior oropharynx normal, moist mucous membranes, normal hearing acuity  EYES:  EOM, conjunctivae, lids, pupils and irises normal  NECK:  No adenopathy,masses or thyromegaly  RESP:  RR 16, unlabored, + bibasilar crackles  CV: RRR no M  ABDOMEN: soft, non-tender  SKIN:  Extensive bruising, subconjunctival hemorrhages over arms  Both LE wrapped.  PSYCH:  oriented X 3, normal insight, judgement and memory, affect and mood normal      Results for RICHI BENSON (MRN [id number]) as of 1/9/2017 21:27   Ref. Range 1/4/2017 08:02   Sodium Latest Ref Range: 133-144 mmol/L 133   Potassium Latest Ref Range: 3.4-5.3 mmol/L 3.9   Chloride Latest Ref Range:  mmol/L 93 (L)   Carbon Dioxide Latest Ref Range: 20-32 mmol/L 34 (H)   Urea Nitrogen Latest Ref Range: 7-30 mg/dL 64 (H)   Creatinine Latest Ref Range: 0.66-1.25 mg/dL 1.97 (H)   GFR Estimate Latest Ref Range: >60 mL/min/1.7m2 34 (L)   GFR Estimate If Black Latest Ref Range: >60 mL/min/1.7m2 42 (L)   Calcium Latest Ref Range: 8.5-10.1 mg/dL 8.8   Anion Gap Latest Ref Range: 3-14 mmol/L 5   Magnesium Latest Ref Range: 1.6-2.3 mg/dL 2.1   Glucose Latest Ref Range: 70-99 mg/dL 99   WBC Latest Ref Range: 4.0-11.0 10e9/L 7.4   Hemoglobin Latest Ref Range: 13.3-17.7 g/dL 8.0 (L)   Hematocrit Latest Ref Range: 40.0-53.0 % 26.7 (L)   Platelet Count Latest Ref Range: 150-450 10e9/L 158   RBC Count Latest Ref Range: 4.4-5.9 10e12/L 2.76 (L)   MCV Latest Ref Range:  fl 97   MCH Latest Ref Range: 26.5-33.0 pg 29.0   MCHC Latest Ref Range: 31.5-36.5 g/dL 30.0 (L)   RDW Latest Ref Range: 10.0-15.0 % 21.2 (H)       ASSESSMENT/PLAN:    (K92.2) Gastrointestinal hemorrhage, unspecified gastrointestinal hemorrhage type  (primary encounter diagnosis)  Comment: Due to gastropathy. No evidence of ongoing bleeding  Plan: Monitor Hgb. Monitor stools    (I50.23) Acute on chronic systolic congestive heart failure (H)  Comment:  Chronic, compensated, though tenuous status in view of chronic hypotension, impaired LV systolic function, mild to mod MR, CKD, COPD with pulmonary HTN, chronic anemia.  Plan closely follow wt, exam, BMP, Hgb. Accept BP systolic 80s-90s/  Continue current diuretic regimen. [facility] f/u    (D64.9) Anemia, unspecified type  Comment: Due to slow blood loss from gastropathy, stable  Plan: Monitor Hgb, continue PPI, po Fe      (N18.3) CKD (chronic kidney disease) stage 3, GFR 30-59 ml/min  Comment: Baseline creatinine around 1.9.  Plan: Avoid nephrotoxic medications and adjust medications per renal function. Monitor renal function closely.    (J44.9) Chronic obstructive pulmonary disease, unspecified COPD type (H)  Comment: stable, on chronic 02  Plan: Continue current treatments    (E03.9) Hypothyroidism, unspecified type  Comment: continue current thyroid tx  Repeat TFTs in one month    (I48.0) Paroxysmal atrial fibrillation (H)  Comment: current rhythm is regular/ On ASA only for anticoagulation due to risk of bleeding  Plan: Cont amiodarone, ASA, metoprolol. Monitor HR.     (I83.11,  I83.12) Chronic venous stasis dermatitis of both lower extremities  Comment: chronic open areas with poor healing potential in view of chronic illness  Plan: Wound nurse monitoring. F/u wound clinic    (E11.59) Type 2 diabetes mellitus with other circulatory complication (H)  Comment: BG under fair control  Plan: Monitor BG, continue SSI coverage for now, Lantus remains on hold.      Juan Lizama MD

## 2017-01-13 NOTE — PROGRESS NOTES
[facility]    Chief Complaint   Patient presents with     RECHECK       HPI:    Scott Hebert is a 66 year old  (1950), who is being seen today for an episodic care visit at [facility]. Hospital stay was at [facility] from 12/19/16 through 1/4/17. He was admitted for acute CHF and GI bleed related to gastropathy. Admitted to this facility for  rehab, medical management and nursing care.      Today's concern is:  Gastrointestinal hemorrhage, unspecified gastrointestinal hemorrhage type  Denies dark or bloody stools. Denies dyspepsia    Acute on chronic systolic congestive heart failure (H)  Weight today 186 lbs. Goal weight is 186-188 lbs. Ongoing edema. No SOB. He says he tries to follow the fluid restriction. He has been seen adding salt to foods.    Anemia, unspecified type  HEMOGLOBIN   Date Value Ref Range Status   01/12/2017 8.0* 14.0 - 18.0 g/dL Final   01/04/2017 8.0* 13.3 - 17.7 g/dL Final   11/13/2008 11.5* 13.2 - 17.1 g/dL        Urinary retention  Denies dysuria, hematuria, retention. Wears condom cath at night. Uses urinal during day.    CKD (chronic kidney disease) stage 3, GFR 30-59 ml/min  Baseline creatinine around 2  CREATININE   Date Value Ref Range Status   01/12/2017 1.65* 0.70 - 1.30 mg/dL Final       Chronic obstructive pulmonary disease, unspecified COPD type (H)  Due to nerve damage during an ablation about 6 years ago. Disease has been stable since then, no progression. On chronic oxygen.    Paroxysmal atrial fibrillation (H)  HR .    Chronic venous stasis dermatitis of both lower extremities  Several open areas currently. Wound nurse at [facility] is monitoring    Chronic constipation  Bowels moving well currently    Physical deconditioning  Severe, unable to walk at this time. His goal is to return to his house if possible. He lives alone, has stairs. Unfortunately he has been variably cooperative with therapy. He  has been in and out of [facility] for several months. [facility] is saying they will no longer cover his stay as of 1/16/17.    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 Of note, his weight was up to 190 lbs that day as well. He had noted that he didn't void very much the night prior to this weight and labs. Now Na 131       ALLERGIES: Transfusion (informational only) and Erythromycin  Past Medical, Surgical, Family and Social History reviewed and updated in [facility].    Current Outpatient Prescriptions   Medication Sig Dispense Refill     HYDRALAZINE HCL PO Take 10 mg by mouth 3 times daily Do not hold for systolic blood pressure, only hold for MAP < 65. MAP=[(2x diastolic)+systolic]/3.       morphine 0.1% in solosite topical gel Place onto the skin 4 times daily as needed Apply 1 gm topically to LLE for pain four times daily as needed       metoprolol (TOPROL-XL) 25 MG 24 hr tablet Take 0.5 tablets (12.5 mg) by mouth daily Do not hold for systolic blood pressure, only hold for mean arterial pressure (MAP) < 65 or HR <60.   MAP = [(2x diastolic) + systolic] / 3 30 tablet      Melatonin 10 MG TABS tablet Take 1 tablet (10 mg) by mouth At Bedtime       methadone (DOLOPHINE) 10 MG tablet Take 1 tablet (10 mg) by mouth 3 times daily 90 tablet 0     acetaminophen (TYLENOL) 500 MG tablet Take 2 tablets (1,000 mg) by mouth every 6 hours as needed for mild pain or fever       aspirin 81 MG chewable tablet Take 1 tablet (81 mg) by mouth daily 36 tablet      magnesium oxide (MAG-OX) 400 MG tablet Take 1 tablet (400 mg) by mouth daily 30 tablet 3     isosorbide  dinitrate (ISORDIL) 20 MG tablet Take 1 tablet (20 mg) by mouth 3 times daily (before meals) Do not hold for systolic blood pressure, only hold for mean arterial pressure (MAP) < 65.   MAP = [(2x diastolic) + systolic] / 3       amiodarone (PACERONE/CODARONE) 200 MG tablet Take 1 tablet (200 mg) by mouth daily 60 tablet 3     albuterol (PROAIR HFA/PROVENTIL HFA/VENTOLIN HFA) 108 (90 BASE) MCG/ACT Inhaler Inhale 2 puffs into the lungs every 6 hours as needed for shortness of breath / dyspnea or wheezing 1 Inhaler 0     mometasone-formoterol (DULERA) 200-5 MCG/ACT oral inhaler Inhale 2 puffs into the lungs 2 times daily       tiotropium (SPIRIVA) 18 MCG capsule Inhale 1 capsule (18 mcg) into the lungs daily 30 capsule      gabapentin (NEURONTIN) 100 MG capsule Take 2 capsules (200 mg) by mouth At Bedtime 90 capsule 3     insulin aspart (NOVOLOG PEN) 100 UNIT/ML injection Inject 1-3 Units Subcutaneous 3 times daily (before meals) Do Not give Correction Insulin if Pre-Meal BG less than 140. For Pre-Meal  - 239 give 1 unit. For Pre-Meal  - 339 give 2 units. For Pre-Meal BG greater than or equal to 340 give 3 units. To be given with prandial insulin, and based on pre-meal blood glucose. Notify provider if glucose greater than or equal to 350 mg/dL after administration of correction dose.       insulin aspart (NOVOLOG PEN) 100 UNIT/ML injection Inject 1-3 Units Subcutaneous At Bedtime Do Not give Bedtime Correction Insulin if BG less than 200. For  - 299 give 1 unit. For  - 399 give 2 units For BG greater than or equal 400 give 3 units. Notify provider if glucose greater than or equal to 350 mg/dL after administration of correction dose.       atorvastatin (LIPITOR) 20 MG tablet Take 1 tablet (20 mg) by mouth daily 30 tablet 3     guaiFENesin (MUCINEX) 600 MG 12 hr tablet Take 1 tablet (600 mg) by mouth 2 times daily 28 tablet 3     sodium chloride (OCEAN) 0.65 % nasal spray Spray 2 sprays into both  nostrils every hour as needed for congestion 1 Bottle 0     lidocaine (LIDODERM) 5 % Patch Place 1 patch onto the skin every 24 hours To chest for for chest pain. 30 patch      torsemide (DEMADEX) 100 MG tablet Take 1 tablet (100 mg) by mouth 2 times daily Do not hold for systolic blood pressure, only hold for mean arterial pressure (MAP) < 65.   MAP = [(2x diastolic) + systolic] / 3       allopurinol (ZYLOPRIM) 300 MG tablet Take 1 tablet (300 mg) by mouth daily 30 tablet 3     ferrous gluconate (FERGON) 324 (38 FE) MG tablet Take 1 tablet (324 mg) by mouth 2 times daily 30 tablet 3     bisacodyl (DULCOLAX) 5 MG EC tablet Take 1 tablet (5 mg) by mouth daily 60 tablet      bisacodyl (DULCOLAX) 10 MG Suppository Place 1 suppository (10 mg) rectally daily as needed for constipation 30 suppository      polyethylene glycol (MIRALAX) powder Take 17 g by mouth 2 times daily 119 g      senna-docusate (SENOKOT-S;PERICOLACE) 8.6-50 MG per tablet Take 2 tablets by mouth 2 times daily Hold for diarrhea. 100 tablet      calcium carb 1250 mg, 500 mg [place],/vitamin D 200 units (OSCAL WITH D) 500-200 MG-UNIT per tablet Take 1 tablet by mouth 2 times daily (with meals) 60 tablet 3     potassium chloride SA (K-DUR/KLOR-CON M) 20 MEQ CR tablet Take 2 tablets (40 mEq) by mouth 2 times daily 90 tablet      multivitamin, therapeutic with minerals (MULTI-VITAMIN) TABS tablet Take 1 tablet by mouth daily 30 each 0     levothyroxine (SYNTHROID/LEVOTHROID) 175 MCG tablet Take 1 tablet (175 mcg) by mouth every morning (before breakfast) 30 tablet      pantoprazole (PROTONIX) 40 MG EC tablet Take 1 tablet (40 mg) by mouth every morning 30 tablet      order for DME Equipment being ordered: Oxygen 2-3L/min 1 Can 0     order for DME Equipment being ordered: Xerofoam gauze 6x8 sheets.  For a total of 5 large wounds, daily change 90 each 1     order for DME Equipment being ordered: 4x4 gauze, sterile gauze 4 Box 1     order for DME Equipment being  "ordered: wound cleanser 1 Bottle 1     order for DME Equipment being ordered: Oxygen    Use 2-3 L via NC 1 Bottle 0     ORDER FOR DME Equipment being ordered: surgical shoe 1 Device 0     blood glucose monitoring (ACCU-CHEK MULTICLIX) lancets Use to test blood sugar 4 times daily or as directed. 1 Box prn     ORDER FOR DME BIPAP 20/11       ORDER FOR DME Blood pressure monitor device. 1 Device 1     Respiratory Therapy Supplies (NEBULIZER/TUBING/MOUTHPIECE) KIT 1 kit as needed. 1 kit 1     ORDER FOR DME Oxygen 2 lits/min continuous. From [employer] 1 Device 1       REVIEW OF SYSTEMS:  10 point ROS of systems including Constitutional, Eyes, Respiratory, Cardiovascular, Gastroenterology, Genitourinary, Integumentary, Muscularskeletal, Psychiatric were all negative except for pertinent positives noted in my HPI.    Physical Exam:  BP 89/51 mmHg  Pulse 78  Temp(Src) 97.8  F (36.6  C)  Resp 20  Ht 6' 1\" (1.854 m)  Wt 186 lb (84.369 kg)  BMI 24.55 kg/m2  SpO2 94%   GENERAL APPEARANCE:  Alert, in no distress, oriented, thin  ENT:  Mouth and posterior oropharynx normal, moist mucous membranes, normal hearing acuity  EYES:  EOM, conjunctivae, lids, pupils and irises normal  NECK:  No adenopathy,masses or thyromegaly  RESP:  respiratory effort and palpation of chest normal, lungs clear to auscultation , no respiratory distress  CV:  Palpation and auscultation of heart done , regular rate and rhythm, no murmur, rub, or gallop, edema difficult to assess due to dressings, wraps  ABDOMEN:  normal bowel sounds, soft, nontender, no hepatosplenomegaly or other masses  SKIN:  open areas not viewed, has several small bruises covering both arms  PSYCH:  oriented X 3, normal insight, judgement and memory, affect and mood normal      Recent Labs:    CBC RESULTS:   Recent Labs   Lab Test 01/12/17 01/04/17   0802  01/03/17   0902   WBC   --   7.4  7.2   RBC   --   2.76*  2.88*   HGB  8.0*  8.0*  8.3*   HCT   --   26.7*  27.9*   MCV   " --   97  97   MCH   --   29.0  28.8   MCHC   --   30.0*  29.7*   RDW   --   21.2*  21.1*   PLT   --   158  174       Last Basic Metabolic Panel:  Recent Labs   Lab Test 01/12/17 01/09/17   NA  131*  129*   POTASSIUM  3.6  4.9   CHLORIDE  93*  92*   АНДРЕЙ  9.1  9.2   CO2  33*  31   BUN  58*  66*   CR  1.65*  2.18*   GLC  74  77       Liver Function Studies -   Recent Labs   Lab Test  12/19/16   1431  12/05/16   1059  12/04/16   0911   11/29/16   2108  11/13/08   PROTTOTAL  7.3   --    --    --   6.9   < >   --    ALBUMIN  1.9*   --    --    --   1.8*   < >   --    BILITOTAL  0.2   --   0.4   --   0.4   < >  0.3   ALKPHOS  249*  441*  480*   < >  612*   < >  63   AST  40   --    --    --   88*   < >  31   ALT  16   --    --    --   55   < >  35   BILIDIRECT   --    --    --    --    --    --   0.1    < > = values in this interval not displayed.       A1C      5.7   9/22/2016  A1C      5.6   7/19/2016        Assessment/Plan:  (K92.2) Gastrointestinal hemorrhage, unspecified gastrointestinal hemorrhage type  (primary encounter diagnosis)  Comment: Due to gastropathy. No evidence of ongoing bleeding. Hgb stable  Plan: Monitor Hgb. Monitor stools    (I50.23) Acute on chronic systolic congestive heart failure (H)  Comment: Chronic. Currently: compensated.  Plan: Continue current medications: torsemide. Monitor BMP. Monitor weights q day. Call provider if greater than 2 pound gain from previous weight. Monitor for shortness of breath, wheezing, increasing lower extremity edema and change in activity tolerance.     (D64.9) Anemia, unspecified type  Comment: Due to slow blood loss from gastropathy. Hgb stable  Plan: Monitor Hgb    (R33.9) Urinary retention  Comment: Not currently an issue  Plan: Monitor for s/sx retention    (N18.3) CKD (chronic kidney disease) stage 3, GFR 30-59 ml/min  Comment: Improved from reported baseline. Will need to monitor closely with any adjustment to diuretics.   Plan: Avoid nephrotoxic medications  and adjust medications per renal function. Monitor renal function as needed.     (J44.9) Chronic obstructive pulmonary disease, unspecified COPD type (H)  Comment: Per history, this seems to be a stable, restrictive disease process. Currently controlled  Plan: Continue current POC with no changes at this time and adjustments as needed.    (I48.0) Paroxysmal atrial fibrillation (H)  Comment: Rate controlled. On ASA only for anticoagulation due to risk of bleeding  Plan: Cont amiodarone, ASA, BB. Monitor HR.     (I83.11,  I83.12) Chronic venous stasis dermatitis of both lower extremities  Comment: With open areas. At risk for infection, will need to monitor closely  Plan: Wound nurse monitoring. F/u wound clinic    (K59.09) Chronic constipation  Comment: Chronic condition being managed with medications and is currently asymptomatic.  Plan: Continue current POC with no changes at this time and adjustments as needed.    (R53.81) Physical deconditioning  Comment: Severe due to acute illness on chronic debility. It is unclear at this point whether he will ever be stable to return to his home. He is not safe to do so at this time.  will schedule care conference, but it is unclear what his disposition will be.  Plan: Continue 24 hour skilled nursing care.    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   Plan: D/C sliding scale. BGM BID.    (I95.9) Hypotension, unspecified hypotension type  Comment: Chronic. Asymptomatic  Plan: Continue current POC with no changes at this time and adjustments as needed.    (E87.1) Hyponatremia  Comment: May have been related to his lack of diuresis the night before. May also be due to not following his fluid restriction. Slightly improved.  Plan: Recheck BMP next week at [facility] appt      Orders:  D/C sliding scale  BGM BID        Electronically signed by  SHWETA [facility]  Pager:  [phone]

## 2017-01-17 NOTE — MR AVS SNAPSHOT
After Visit Summary   1/17/2017    Scott Hebert    MRN: [id number]           Patient Information     Date Of Birth          1950        Visit Information        Provider Department      1/17/2017 6:00 PM Elizabeth Dhaliwal, APRN CNP [facility]        Today's Diagnoses     Hypothyroidism    -  1     Chronic systolic congestive heart failure (H)           Care Instructions    You were seen today in the [facility] in the Cardiovascular Clinic at the [facility].     Cardiology Provider you saw during your visit today: Elizabeth Dhaliwal NP    Recommendations:   1) Increase Toprol to 25 mg daily    Follow-up:   1) 1 month with [facility] Clinic    Results:   Results for SCOTT HEBERT (MRN [id number]) as of 1/17/2017 18:20   Ref. Range 1/17/2017 17:56   Sodium Latest Ref Range: 133-144 mmol/L 131 (L)   Potassium Latest Ref Range: 3.4-5.3 mmol/L 4.3   Chloride Latest Ref Range:  mmol/L 92 (L)   Carbon Dioxide Latest Ref Range: 20-32 mmol/L 31   Urea Nitrogen Latest Ref Range: 7-30 mg/dL 56 (H)   Creatinine Latest Ref Range: 0.66-1.25 mg/dL 1.71 (H)   GFR Estimate Latest Ref Range: >60 mL/min/1.7m2 40 (L)   GFR Estimate If Black Latest Ref Range: >60 mL/min/1.7m2 49 (L)   Calcium Latest Ref Range: 8.5-10.1 mg/dL 8.8   Anion Gap Latest Ref Range: 3-14 mmol/L 8   Glucose Latest Ref Range: 70-99 mg/dL 124 (H)     Thank you for your visit today. If you have questions or concerns regarding today's visit, please call me.    Charlotte Jovel (Teri) RN  RN Care Coordinator  [facility]  C.O.R.E. (Cardiomyopathy Optimization Rehabilitation Education) Clinic     For emergencies please call [phone].    For any scheduling needs or nursing related questions, please call [phone]. Select option #1 for the University and then option #1 again for scheduling issues; select option #3 for nursing questions or concerns.     After hours, please call [phone], select  option #4 and ask for the cardiologist on-call. Tell them that you are a CORE patient at the [place]                  Follow-ups after your visit        Follow-up notes from your care team     Return in about 1 month (around 2/17/2017) for CORE follow-up with Elizabeth.      Your next 10 appointments already scheduled     Feb 08, 2017 10:00 AM   New Visit with Arnold Conner MD   [facility] [place] ([facility])    [address]  [facility] [address]  [facility] [address]   [phone]            Feb 21, 2017  5:30 PM   (Arrive by 5:15 PM)   CORE RETURN with SHWETA Mendoza [facility] ([facility])    [address] [place] [address]  [address] [facility] [address] [phone]            May 17, 2017  4:20 PM   (Arrive by 4:05 PM)   New Patient Visit with Kayla Sepulveda MD   Gastroenterology and IBD ([facility])    [address] [place] [address]  [address] [facility] [id number] [phone]              Who to contact     If you have questions or need follow up information about today's clinic visit or your schedule please contact [facility] directly at [phone].  Normal or non-critical lab and imaging results will be communicated to you by [employer], letter or phone within 4 business days after the clinic has received the results. If you do not hear from us within 7 days, please contact the clinic through MyChart or phone. If you have a critical or abnormal lab result, we will notify you by phone as soon as possible.  Submit refill requests through [employer] or call your pharmacy and they will forward the refill request to us. Please allow 3 business days for your refill to be completed.          Additional Information About Your Visit        [employer] Information     [employer] lets you send messages to your doctor, view your test results, renew your prescriptions, schedule appointments and more. To  "sign up, go to [email or web address] . Click on \"Log in\" on the left side of the screen, which will take you to the Welcome page. Then click on \"Sign up Now\" on the right side of the page.     You will be asked to enter the access code listed below, as well as some personal information. Please follow the directions to create your username and password.     Your access code is: [email or web address]  Expires: 2/10/2017  2:46 PM     Your access code will  in 90 days. If you need help or a new code, please call your [place] clinic or [phone].        Care EveryWhere ID     This is your Care EveryWhere ID. This could be used by other organizations to access your [place] medical records  [id number]        Your Vitals Were     Pulse Pulse Oximetry                91 94%           Blood Pressure from Last 3 Encounters:   17 94/60   17 89/51   01/10/17 104/52    Weight from Last 3 Encounters:   17 84.369 kg (186 lb)   01/10/17 84.823 kg (187 lb)   17 84.913 kg (187 lb 3.2 oz)              Today, you had the following     No orders found for display         Today's Medication Changes          These changes are accurate as of: 17  6:51 PM.  If you have any questions, ask your nurse or doctor.               These medicines have changed or have updated prescriptions.        Dose/Directions    metoprolol 25 MG 24 hr tablet   Commonly known as:  TOPROL-XL   This may have changed:  how much to take   Used for:  Chronic systolic congestive heart failure (H), Hypothyroidism   Changed by:  Elizabeth Dhaliwal, APRN CNP        Dose:  25 mg   Take 1 tablet (25 mg) by mouth daily Do not hold for systolic blood pressure, only hold for mean arterial pressure (MAP) < 65 or HR <60.  MAP = [(2x diastolic) + systolic] / 3   Quantity:  30 tablet   Refills:  3            Where to get your medicines      These medications were sent to [facility] [place] [address]  [address] [facility] [address]     Phone:  [phone]    - metoprolol 25 MG 24 hr tablet             Primary Care Provider Office Phone # Fax #    Scott Bryan [phone]       [facility] [address] [facility] [address]        Thank you!     Thank you for choosing [facility]  for your care. Our goal is always to provide you with excellent care. Hearing back from our patients is one way we can continue to improve our services. Please take a few minutes to complete the written survey that you may receive in the mail after your visit with us. Thank you!             Your Updated Medication List - Protect others around you: Learn how to safely use, store and throw away your medicines at www.disposemymeds.org.          This list is accurate as of: 1/17/17  6:51 PM.  Always use your most recent med list.                   Brand Name Dispense Instructions for use    acetaminophen 500 MG tablet    TYLENOL     Take 2 tablets (1,000 mg) by mouth every 6 hours as needed for mild pain or fever       albuterol 108 (90 BASE) MCG/ACT Inhaler    PROAIR HFA/PROVENTIL HFA/VENTOLIN HFA    1 Inhaler    Inhale 2 puffs into the lungs every 6 hours as needed for shortness of breath / dyspnea or wheezing       allopurinol 300 MG tablet    ZYLOPRIM    30 tablet    Take 1 tablet (300 mg) by mouth daily       amiodarone 200 MG tablet    PACERONE/CODARONE    60 tablet    Take 1 tablet (200 mg) by mouth daily       aspirin 81 MG chewable tablet     36 tablet    Take 1 tablet (81 mg) by mouth daily       atorvastatin 20 MG tablet    LIPITOR    30 tablet    Take 1 tablet (20 mg) by mouth daily       * bisacodyl 5 MG EC tablet    DULCOLAX    60 tablet    Take 1 tablet (5 mg) by mouth daily       * bisacodyl 10 MG Suppository    DULCOLAX    30 suppository    Place 1 suppository (10 mg) rectally daily as needed for constipation       blood glucose monitoring lancets     1 Box    Use to test blood  sugar 4 times daily or as directed.       calcium carb 1250 mg (500 mg [place])/vitamin D 200 units 500-200 MG-UNIT per tablet    OSCAL with D    60 tablet    Take 1 tablet by mouth 2 times daily (with meals)       ferrous gluconate 324 (38 FE) MG tablet    FERGON    30 tablet    Take 1 tablet (324 mg) by mouth 2 times daily       gabapentin 100 MG capsule    NEURONTIN    90 capsule    Take 2 capsules (200 mg) by mouth At Bedtime       guaiFENesin 600 MG 12 hr tablet    MUCINEX    28 tablet    Take 1 tablet (600 mg) by mouth 2 times daily       HYDRALAZINE HCL PO      Take 10 mg by mouth 3 times daily Do not hold for systolic blood pressure, only hold for MAP < 65. MAP=[(2x diastolic)+systolic]/3.       isosorbide dinitrate 20 MG tablet    ISORDIL     Take 1 tablet (20 mg) by mouth 3 times daily (before meals) Do not hold for systolic blood pressure, only hold for mean arterial pressure (MAP) < 65.  MAP = [(2x diastolic) + systolic] / 3       levothyroxine 175 MCG tablet    SYNTHROID/LEVOTHROID    30 tablet    Take 1 tablet (175 mcg) by mouth every morning (before breakfast)       lidocaine 5 % Patch    LIDODERM    30 patch    Place 1 patch onto the skin every 24 hours To chest for for chest pain.       magnesium oxide 400 MG tablet    MAG-OX    30 tablet    Take 1 tablet (400 mg) by mouth daily       Melatonin 10 MG Tabs tablet      Take 1 tablet (10 mg) by mouth At Bedtime       methadone 10 MG tablet    DOLOPHINE    90 tablet    Take 1 tablet (10 mg) by mouth 3 times daily       metoprolol 25 MG 24 hr tablet    TOPROL-XL    30 tablet    Take 1 tablet (25 mg) by mouth daily Do not hold for systolic blood pressure, only hold for mean arterial pressure (MAP) < 65 or HR <60.  MAP = [(2x diastolic) + systolic] / 3       mometasone-formoterol 200-5 MCG/ACT oral inhaler    DULERA     Inhale 2 puffs into the lungs 2 times daily       morphine 0.1% in solosite topical gel      Place onto the skin 4 times daily as needed  Apply 1 gm topically to LLE for pain four times daily as needed       multivitamin, therapeutic with minerals Tabs tablet     30 each    Take 1 tablet by mouth daily       nebulizer/tubing/mouthpiece Kit     1 kit    1 kit as needed.       * order for DME      BIPAP 20/11       * order for DME     1 Device    Oxygen 2 lits/min continuous. From [facility]       * order for DME     1 Device    Blood pressure monitor device.       * order for DME     1 Device    Equipment being ordered: surgical shoe       * order for DME     1 Bottle    Equipment being ordered: Oxygen  Use 2-3 L via NC       * order for DME     90 each    Equipment being ordered: Xerofoam gauze 6x8 sheets.  For a total of 5 large wounds, daily change       * order for DME     4 Box    Equipment being ordered: 4x4 gauze, sterile gauze       * order for DME     1 Bottle    Equipment being ordered: wound cleanser       * order for DME     1 Can    Equipment being ordered: Oxygen 2-3L/min       pantoprazole 40 MG EC tablet    PROTONIX    30 tablet    Take 1 tablet (40 mg) by mouth every morning       polyethylene glycol powder    MIRALAX    119 g    Take 17 g by mouth 2 times daily       potassium chloride SA 20 MEQ CR tablet    K-DUR/KLOR-CON M    90 tablet    Take 2 tablets (40 mEq) by mouth 2 times daily       senna-docusate 8.6-50 MG per tablet    SENOKOT-S;PERICOLACE    100 tablet    Take 2 tablets by mouth 2 times daily Hold for diarrhea.       sodium chloride 0.65 % nasal spray    OCEAN    1 Bottle    Spray 2 sprays into both nostrils every hour as needed for congestion       tiotropium 18 MCG capsule    SPIRIVA    30 capsule    Inhale 1 capsule (18 mcg) into the lungs daily       torsemide 100 MG tablet    DEMADEX     Take 1 tablet (100 mg) by mouth 2 times daily Do not hold for systolic blood pressure, only hold for mean arterial pressure (MAP) < 65.  MAP = [(2x diastolic) + systolic] / 3       * Notice:  This list has 11 medication(s) that are the  same as other medications prescribed for you. Read the directions carefully, and ask your doctor or other care provider to review them with you.

## 2017-01-17 NOTE — LETTER
1/17/2017    RE: Scott Hebert  [address] [place] [id number]       HPI:   Scott Hebert is a 66-year old male with a history of ICM, paroxysmal atrial fibrillation complicated by failed ablation with subsequent phrenic nerve injury, CKD, VANDA, CAD with previous stenting, DM, COPD, CHR, and mitral valve regurgitation with MitraClip placement on 10/11/16. EF has been low as far back as 2006 when it was 40-45%, and it was most recently 35-40% on 11/4/16. His MitraClip devices appear stable and he has residual mild to moderate mitral insufficiency. His most recent coronary angiogram showed  of the pRCA just distal to his stent. He has had 6 heart failure hospitalizations in the last year, and has been in a TCU since then (about 2 months). Presents to clinic alone today in a wheelchair from the TCU he has been staying in for continued heart failure follow-up.     Scott was very sleepy throughout the interview and kept nodding off to sleep. Said that he is feeling 'ok' since leaving the hospital, but was unable to provide much information. He does not know what his weights or blood pressures have been like, but he doesn't feel as swollen as he used to. Denies any SOB, remains on continuous oxygen via nasal cannula. Denies any dizziness, chest pain, or palpitations. Says that his energy level has been low and he continues to feel weak, and is only able to walk very short distances with a walker.    PAST MEDICAL HISTORY:  Past Medical History   Diagnosis Date     Thyrotoxicosis without mention of goiter or other cause, without mention of thyrotoxic crisis or storm      s/p GARRETT     Pain in limb      Chronic Pain     Pneumonia 2/24/2009     OTHER LUNG DISEASE NEC      3/25/06: CT chest showing right hilar and mediastinal adenopathy-stable 7/09     Diabetes mellitus (H)      CHF exacerbation (H)      Atrial fibrillation (H)      ablation 1-     Brain aneurysm 12/2/2010     S/p clipping of 3 aneurysm 8/10        Cerebral aneurysm 7/20/2010     Other specified forms of chronic ischemic heart disease      3/06: EF 45% at time of stent in RCA     Unspecified Peripheral Vascular Disease 9/6/2007     S/p Right femoral above-knee popliteal bypass utilizing in situ right greater saphenous vein 11/07      Cor athrscl-uns vessel      3/06: NSTEMI in setting of paroxysmal a-fib with RVR.  Stent to RCA.  Echo with EF 45%. Angio on 7/2/09 showed widely patent stent with moderate focal eccentric stenosis at distal stent edge.. Mid rca 60%, proximal rca 50% 1 st om 20% ,  Mid lad 40% --Intracronary measeuement of mid rca showed FFR of . 94      PVCs (premature ventricular contractions) 12/2/2010     S/p ablation of the PVC's 11/10   (Problem list name updated by automated process. Provider to review and confirm.)     Abdominal aneurysm without mention of rupture 10/12/2005     repaired 2000     Chronic pain 10/12/2005     has chronic pain right foot compartment syndrome requiring narcotic  Methadone 10 mg (75/month) Pain contract signed with marciano Castrejon      CKD (chronic kidney disease) stage 3, GFR 30-59 ml/min 6/15/2011     Community acquired bacterial pneumonia 11/7/2011     Type 2 diabetes, HbA1C goal < 8% (H) 3/2/2011     VANDA-Severe (AHI 67) 2/16/2009     Sleep study  2/12/09 to evaluate poor quality of sleep, unrefreshed sleep, abnormal nocturnal oximetry. LWZ161.5 minutes,  latency prolonged 28.3 minutes. Sleep efficiency reduced 81.2%.  Architecture disrupted with sleep stage changes and arousals. Snoring: very loud. RDI 72.4 and AHI 66.9. REM RDI 80.0.  LO2 73.0%. CPAP Titration:improvement; but not elimination of apneas, hypopneas, desaturatio     COPD (chronic obstructive pulmonary disease) (H) 5/31/2011     Unspecified anemia 4/24/2008     No etiology found 2006.  Had hematuria-  Urology eval with cysto- unremarkable     Other specified acquired hypothyroidism      s/p GARRETT     MRSA      SITE - ARM WOUND 2/27/04      Obese        FAMILY HISTORY:  Family History   Problem Relation Age of Onset     HEART DISEASE Mother      Respiratory Father        SOCIAL HISTORY:  Social History     Social History     Marital Status: Single     Spouse Name: N/A     Number of Children: N/A     Years of Education: N/A     Social History Main Topics     Smoking status: Former Smoker -- 0.25 packs/day for 20 years     Types: Cigarettes     Quit date: 03/06/2009     Smokeless tobacco: Never Used     Alcohol Use: No     Drug Use: No     Sexual Activity:     Partners: Female     Birth Control/ Protection: Surgical     Other Topics Concern     Parent/Sibling W/ Cabg, Mi Or Angioplasty Before 65f 55m? No     Social History Narrative       CURRENT MEDICATIONS:  Current Outpatient Prescriptions   Medication Sig Dispense Refill     HYDRALAZINE HCL PO Take 10 mg by mouth 3 times daily Do not hold for systolic blood pressure, only hold for MAP < 65. MAP=[(2x diastolic)+systolic]/3.       morphine 0.1% in solosite topical gel Place onto the skin 4 times daily as needed Apply 1 gm topically to LLE for pain four times daily as needed       metoprolol (TOPROL-XL) 25 MG 24 hr tablet Take 0.5 tablets (12.5 mg) by mouth daily Do not hold for systolic blood pressure, only hold for mean arterial pressure (MAP) < 65 or HR <60.   MAP = [(2x diastolic) + systolic] / 3 30 tablet      Melatonin 10 MG TABS tablet Take 1 tablet (10 mg) by mouth At Bedtime       methadone (DOLOPHINE) 10 MG tablet Take 1 tablet (10 mg) by mouth 3 times daily 90 tablet 0     acetaminophen (TYLENOL) 500 MG tablet Take 2 tablets (1,000 mg) by mouth every 6 hours as needed for mild pain or fever       aspirin 81 MG chewable tablet Take 1 tablet (81 mg) by mouth daily 36 tablet      magnesium oxide (MAG-OX) 400 MG tablet Take 1 tablet (400 mg) by mouth daily 30 tablet 3     isosorbide dinitrate (ISORDIL) 20 MG tablet Take 1 tablet (20 mg) by mouth 3 times daily (before meals) Do not hold for  systolic blood pressure, only hold for mean arterial pressure (MAP) < 65.   MAP = [(2x diastolic) + systolic] / 3       amiodarone (PACERONE/CODARONE) 200 MG tablet Take 1 tablet (200 mg) by mouth daily 60 tablet 3     albuterol (PROAIR HFA/PROVENTIL HFA/VENTOLIN HFA) 108 (90 BASE) MCG/ACT Inhaler Inhale 2 puffs into the lungs every 6 hours as needed for shortness of breath / dyspnea or wheezing 1 Inhaler 0     mometasone-formoterol (DULERA) 200-5 MCG/ACT oral inhaler Inhale 2 puffs into the lungs 2 times daily       tiotropium (SPIRIVA) 18 MCG capsule Inhale 1 capsule (18 mcg) into the lungs daily 30 capsule      gabapentin (NEURONTIN) 100 MG capsule Take 2 capsules (200 mg) by mouth At Bedtime 90 capsule 3     atorvastatin (LIPITOR) 20 MG tablet Take 1 tablet (20 mg) by mouth daily 30 tablet 3     guaiFENesin (MUCINEX) 600 MG 12 hr tablet Take 1 tablet (600 mg) by mouth 2 times daily 28 tablet 3     sodium chloride (OCEAN) 0.65 % nasal spray Spray 2 sprays into both nostrils every hour as needed for congestion 1 Bottle 0     lidocaine (LIDODERM) 5 % Patch Place 1 patch onto the skin every 24 hours To chest for for chest pain. 30 patch      torsemide (DEMADEX) 100 MG tablet Take 1 tablet (100 mg) by mouth 2 times daily Do not hold for systolic blood pressure, only hold for mean arterial pressure (MAP) < 65.   MAP = [(2x diastolic) + systolic] / 3       allopurinol (ZYLOPRIM) 300 MG tablet Take 1 tablet (300 mg) by mouth daily 30 tablet 3     ferrous gluconate (FERGON) 324 (38 FE) MG tablet Take 1 tablet (324 mg) by mouth 2 times daily 30 tablet 3     bisacodyl (DULCOLAX) 5 MG EC tablet Take 1 tablet (5 mg) by mouth daily 60 tablet      bisacodyl (DULCOLAX) 10 MG Suppository Place 1 suppository (10 mg) rectally daily as needed for constipation 30 suppository      polyethylene glycol (MIRALAX) powder Take 17 g by mouth 2 times daily 119 g      senna-docusate (SENOKOT-S;PERICOLACE) 8.6-50 MG per tablet Take 2 tablets  by mouth 2 times daily Hold for diarrhea. 100 tablet      calcium carb 1250 mg, 500 mg [place],/vitamin D 200 units (OSCAL WITH D) 500-200 MG-UNIT per tablet Take 1 tablet by mouth 2 times daily (with meals) 60 tablet 3     potassium chloride SA (K-DUR/KLOR-CON M) 20 MEQ CR tablet Take 2 tablets (40 mEq) by mouth 2 times daily 90 tablet      multivitamin, therapeutic with minerals (MULTI-VITAMIN) TABS tablet Take 1 tablet by mouth daily 30 each 0     levothyroxine (SYNTHROID/LEVOTHROID) 175 MCG tablet Take 1 tablet (175 mcg) by mouth every morning (before breakfast) 30 tablet      pantoprazole (PROTONIX) 40 MG EC tablet Take 1 tablet (40 mg) by mouth every morning 30 tablet      order for DME Equipment being ordered: Oxygen 2-3L/min 1 Can 0     order for DME Equipment being ordered: Xerofoam gauze 6x8 sheets.  For a total of 5 large wounds, daily change 90 each 1     order for DME Equipment being ordered: 4x4 gauze, sterile gauze 4 Box 1     order for DME Equipment being ordered: wound cleanser 1 Bottle 1     order for DME Equipment being ordered: Oxygen    Use 2-3 L via NC 1 Bottle 0     ORDER FOR DME Equipment being ordered: surgical shoe 1 Device 0     blood glucose monitoring (ACCU-CHEK MULTICLIX) lancets Use to test blood sugar 4 times daily or as directed. 1 Box prn     ORDER FOR DME BIPAP 20/11       ORDER FOR DME Blood pressure monitor device. 1 Device 1     Respiratory Therapy Supplies (NEBULIZER/TUBING/MOUTHPIECE) KIT 1 kit as needed. 1 kit 1     ORDER FOR DME Oxygen 2 lits/min continuous. From [facility] [place] 1 Device 1       ROS:   Constitutional: No fever, chills, or sweats. No weight gain/loss.   ENT: No visual disturbance, ear ache, epistaxis, sore throat.   Allergies/Immunologic: Negative.   Respiratory: No cough, hemoptysis.   Cardiovascular: As per HPI.   GI: No nausea, vomiting, hematemesis, melena, or hematochezia.   : No urinary frequency, dysuria, or hematuria.   Integument: Negative.   Psychiatric:  Negative.   Neuro: Negative.   Endocrinology: Negative.   Musculoskeletal: Negative.    EXAM:  BP 94/60  Pulse 91  SpO2 94%  General: appears comfortable, flat affect, in a wheelchair and wearing oxygen  Head: normocephalic, atraumatic  Eyes: anicteric sclera, EOMI  Neck: no adenopathy  Orophyarynx: moist mucosa, no lesions, dentition intact  Heart: regular, S1/S2, no murmur, gallop, rub, estimated JVP 8 cm  Lungs: clear, no rales or wheezing  Abdomen: soft, non-tender, bowel sounds present, no hepatosplenomegaly  Extremities: no clubbing, cyanosis or edema  Neurological: normal speech and affect, no gross motor deficits    Labs:  CBC RESULTS:  Lab Results   Component Value Date    WBC 7.4 01/04/2017    RBC 2.76* 01/04/2017    HGB 8.0* 01/12/2017    HGB 11.5* 11/13/2008    HCT 26.7* 01/04/2017    MCV 97 01/04/2017    MCH 29.0 01/04/2017    MCHC 30.0* 01/04/2017    RDW 21.2* 01/04/2017     01/04/2017       CMP RESULTS:  Lab Results   Component Value Date    * 01/12/2017    POTASSIUM 3.6 01/12/2017    CHLORIDE 93* 01/12/2017    CO2 33* 01/12/2017    ANIONGAP 5 01/12/2017    GLC 74 01/12/2017    * 01/07/2013    BUN 58* 01/12/2017    CR 1.65* 01/12/2017    GFRESTIMATED 42* 01/12/2017    GFRESTBLACK 51* 01/12/2017    АНДРЕЙ 9.1 01/12/2017    BILITOTAL 0.2 12/19/2016    ALBUMIN 1.9* 12/19/2016    ALKPHOS 249* 12/19/2016    ALT 16 12/19/2016    AST 40 12/19/2016        INR RESULTS:  Lab Results   Component Value Date    INR 1.40* 12/22/2016    INR 1.3* 10/11/2016    INR 1.3* 04/12/2011       No components found for: CK  Lab Results   Component Value Date    MAG 2.1 01/04/2017     Lab Results   Component Value Date    NTBNP 2017* 03/21/2016       Most recent echocardiogram 11/4/16:  Interpretation Summary  Limited TTE study.  Moderately (EF 35-40%) reduced left ventricular function is present. Right venticular function cannot be assessed.  S/p trans-catheter [id number] mitral valve repair (TMVR).  Two  MitraClip devices are in place. They are stable without evidence for SLA(single leaflet attachment.) There is mild to mild to moderate mitral insufficiency. The the mean diastolic gradient is 5 mmHg at a HR of 84 bpm.  The inferior vena cava is dilated at 2.37 cm without respiratory variability,  consistent with increased right atrial pressure. The estimated right atrial pressure is > 15 mmHg.  No pericardial effusion is present.  This study was compared with the study from 10/26/2016. There has been no significant change.    Assessment and Plan:   1.  Chronic systolic heart failure secondary to ischemic cardiomyopathy.  Stage C  NYHA Class II-III.  Scott Hebert is a 66-year old male with a history of ICM, paroxysmal atrial fibrillation complicated by failed ablation with subsequent phrenic nerve injury, CKD, VANDA, CAD with previous stenting, DM, COPD, CHR, and mitral valve regurgitation with MitraClip placement on 10/11/16. Patient remains very deconditioned and does not appear to have progressed much after 2 months in the TCU, although it was difficult to assess given that he came in alone and was not very conversant. Clinic staff weren't able to get a weight today, but based on his weight from several days ago his trend appears stable. Blood pressures remain in the 90s-100s/50s range. Scott is mainly nonambulatory, but denies SOB or dizziness issues. He appears euvolemic on exam today. It seems likely that he will be residing in a rehab facility for quite some time, if not permanently. From a heart failure perspective he appears compensated and stable. Will increase his Toprol to 25mg/daily, and add on a TSH given his significant lethargy during today's visit.      ACEi/ARB: no; was previously on Lisinopril but have not restarted given persistently elevated renal function from his baseline. Will continue Hydralazine and Isordil and keep consider switching him to Lisinopril at future visit.    BB: yes; increase  Toprol to 25mg daily    Aldosterone antagonist: deferred while other medical therapy is prioritized    SCD prophylaxis: does not meet criteria for implant    Fluid status: euvolemic; continue Torsemide 100mg BID.    2. Atrial fibrillation: Continue Amiodarone, not on warfarin due to history of GI bleed.     3. CAD: Continue Toprol, Lipitor, and Aspirin.     Follow-up: RTC in one month.      SHWETA Peck CNP  Pager [phone]

## 2017-01-17 NOTE — Clinical Note
1/17/2017      RE: Scott Hebert  [address] [facility] [address]       Dear Colleague,    Thank you for the opportunity to participate in the care of your patient, Scott Hebert, at the [facility] at [facility]. Please see a copy of my visit note below.    Chief Complaint:  ***    HPI: ***    PAST MEDICAL HISTORY:  Past Medical History   Diagnosis Date     Thyrotoxicosis without mention of goiter or other cause, without mention of thyrotoxic crisis or storm      s/p GARRETT     Pain in limb      Chronic Pain     Pneumonia 2/24/2009     OTHER LUNG DISEASE NEC      3/25/06: CT chest showing right hilar and mediastinal adenopathy-stable 7/09     Diabetes mellitus (H)      CHF exacerbation (H)      Atrial fibrillation (H)      ablation 1-     Brain aneurysm 12/2/2010     S/p clipping of 3 aneurysm 8/10       Cerebral aneurysm 7/20/2010     Other specified forms of chronic ischemic heart disease      3/06: EF 45% at time of stent in RCA     Unspecified Peripheral Vascular Disease 9/6/2007     S/p Right femoral above-knee popliteal bypass utilizing in situ right greater saphenous vein 11/07      Cor athrscl-uns vessel      3/06: NSTEMI in setting of paroxysmal a-fib with RVR.  Stent to RCA.  Echo with EF 45%. Angio on 7/2/09 showed widely patent stent with moderate focal eccentric stenosis at distal stent edge.. Mid rca 60%, proximal rca 50% 1 st om 20% ,  Mid lad 40% --Intracronary measeuement of mid rca showed FFR of . 94      PVCs (premature ventricular contractions) 12/2/2010     S/p ablation of the PVC's 11/10   (Problem list name updated by automated process. Provider to review and confirm.)     Abdominal aneurysm without mention of rupture 10/12/2005     repaired 2000     Chronic pain 10/12/2005     has chronic pain right foot compartment syndrome requiring narcotic  Methadone 10 mg (75/month) Pain contract signed with marciano Castrejon      CKD  (chronic kidney disease) stage 3, GFR 30-59 ml/min 6/15/2011     Community acquired bacterial pneumonia 11/7/2011     Type 2 diabetes, HbA1C goal < 8% (H) 3/2/2011     VANDA-Severe (AHI 67) 2/16/2009     Sleep study  2/12/09 to evaluate poor quality of sleep, unrefreshed sleep, abnormal nocturnal oximetry. GCN559.5 minutes,  latency prolonged 28.3 minutes. Sleep efficiency reduced 81.2%.  Architecture disrupted with sleep stage changes and arousals. Snoring: very loud. RDI 72.4 and AHI 66.9. REM RDI 80.0.  LO2 73.0%. CPAP Titration:improvement; but not elimination of apneas, hypopneas, desaturatio     COPD (chronic obstructive pulmonary disease) (H) 5/31/2011     Unspecified anemia 4/24/2008     No etiology found 2006.  Had hematuria-  Urology eval with cysto- unremarkable     Other specified acquired hypothyroidism      s/p GARRETT     MRSA      SITE - ARM WOUND 2/27/04     Obese        FAMILY HISTORY:  Family History   Problem Relation Age of Onset     HEART DISEASE Mother      Respiratory Father        SOCIAL HISTORY:  Social History     Social History     Marital Status: Single     Spouse Name: N/A     Number of Children: N/A     Years of Education: N/A     Social History Main Topics     Smoking status: Former Smoker -- 0.25 packs/day for 20 years     Types: Cigarettes     Quit date: 03/06/2009     Smokeless tobacco: Never Used     Alcohol Use: No     Drug Use: No     Sexual Activity:     Partners: Female     Birth Control/ Protection: Surgical     Other Topics Concern     Parent/Sibling W/ Cabg, Mi Or Angioplasty Before 65f 55m? No     Social History Narrative       CURRENT MEDICATIONS:  Current Outpatient Prescriptions   Medication Sig Dispense Refill     HYDRALAZINE HCL PO Take 10 mg by mouth 3 times daily Do not hold for systolic blood pressure, only hold for MAP < 65. MAP=[(2x diastolic)+systolic]/3.       morphine 0.1% in solosite topical gel Place onto the skin 4 times daily as needed Apply 1 gm topically to  LLE for pain four times daily as needed       metoprolol (TOPROL-XL) 25 MG 24 hr tablet Take 0.5 tablets (12.5 mg) by mouth daily Do not hold for systolic blood pressure, only hold for mean arterial pressure (MAP) < 65 or HR <60.   MAP = [(2x diastolic) + systolic] / 3 30 tablet      Melatonin 10 MG TABS tablet Take 1 tablet (10 mg) by mouth At Bedtime       methadone (DOLOPHINE) 10 MG tablet Take 1 tablet (10 mg) by mouth 3 times daily 90 tablet 0     acetaminophen (TYLENOL) 500 MG tablet Take 2 tablets (1,000 mg) by mouth every 6 hours as needed for mild pain or fever       aspirin 81 MG chewable tablet Take 1 tablet (81 mg) by mouth daily 36 tablet      magnesium oxide (MAG-OX) 400 MG tablet Take 1 tablet (400 mg) by mouth daily 30 tablet 3     isosorbide dinitrate (ISORDIL) 20 MG tablet Take 1 tablet (20 mg) by mouth 3 times daily (before meals) Do not hold for systolic blood pressure, only hold for mean arterial pressure (MAP) < 65.   MAP = [(2x diastolic) + systolic] / 3       amiodarone (PACERONE/CODARONE) 200 MG tablet Take 1 tablet (200 mg) by mouth daily 60 tablet 3     albuterol (PROAIR HFA/PROVENTIL HFA/VENTOLIN HFA) 108 (90 BASE) MCG/ACT Inhaler Inhale 2 puffs into the lungs every 6 hours as needed for shortness of breath / dyspnea or wheezing 1 Inhaler 0     mometasone-formoterol (DULERA) 200-5 MCG/ACT oral inhaler Inhale 2 puffs into the lungs 2 times daily       tiotropium (SPIRIVA) 18 MCG capsule Inhale 1 capsule (18 mcg) into the lungs daily 30 capsule      gabapentin (NEURONTIN) 100 MG capsule Take 2 capsules (200 mg) by mouth At Bedtime 90 capsule 3     atorvastatin (LIPITOR) 20 MG tablet Take 1 tablet (20 mg) by mouth daily 30 tablet 3     guaiFENesin (MUCINEX) 600 MG 12 hr tablet Take 1 tablet (600 mg) by mouth 2 times daily 28 tablet 3     sodium chloride (OCEAN) 0.65 % nasal spray Spray 2 sprays into both nostrils every hour as needed for congestion 1 Bottle 0     lidocaine (LIDODERM) 5 %  Patch Place 1 patch onto the skin every 24 hours To chest for for chest pain. 30 patch      torsemide (DEMADEX) 100 MG tablet Take 1 tablet (100 mg) by mouth 2 times daily Do not hold for systolic blood pressure, only hold for mean arterial pressure (MAP) < 65.   MAP = [(2x diastolic) + systolic] / 3       allopurinol (ZYLOPRIM) 300 MG tablet Take 1 tablet (300 mg) by mouth daily 30 tablet 3     ferrous gluconate (FERGON) 324 (38 FE) MG tablet Take 1 tablet (324 mg) by mouth 2 times daily 30 tablet 3     bisacodyl (DULCOLAX) 5 MG EC tablet Take 1 tablet (5 mg) by mouth daily 60 tablet      bisacodyl (DULCOLAX) 10 MG Suppository Place 1 suppository (10 mg) rectally daily as needed for constipation 30 suppository      polyethylene glycol (MIRALAX) powder Take 17 g by mouth 2 times daily 119 g      senna-docusate (SENOKOT-S;PERICOLACE) 8.6-50 MG per tablet Take 2 tablets by mouth 2 times daily Hold for diarrhea. 100 tablet      calcium carb 1250 mg, 500 mg [place],/vitamin D 200 units (OSCAL WITH D) 500-200 MG-UNIT per tablet Take 1 tablet by mouth 2 times daily (with meals) 60 tablet 3     potassium chloride SA (K-DUR/KLOR-CON M) 20 MEQ CR tablet Take 2 tablets (40 mEq) by mouth 2 times daily 90 tablet      multivitamin, therapeutic with minerals (MULTI-VITAMIN) TABS tablet Take 1 tablet by mouth daily 30 each 0     levothyroxine (SYNTHROID/LEVOTHROID) 175 MCG tablet Take 1 tablet (175 mcg) by mouth every morning (before breakfast) 30 tablet      pantoprazole (PROTONIX) 40 MG EC tablet Take 1 tablet (40 mg) by mouth every morning 30 tablet      order for DME Equipment being ordered: Oxygen 2-3L/min 1 Can 0     order for DME Equipment being ordered: Xerofoam gauze 6x8 sheets.  For a total of 5 large wounds, daily change 90 each 1     order for DME Equipment being ordered: 4x4 gauze, sterile gauze 4 Box 1     order for DME Equipment being ordered: wound cleanser 1 Bottle 1     order for DME Equipment being ordered:  Oxygen    Use 2-3 L via NC 1 Bottle 0     ORDER FOR DME Equipment being ordered: surgical shoe 1 Device 0     blood glucose monitoring (ACCU-CHEK MULTICLIX) lancets Use to test blood sugar 4 times daily or as directed. 1 Box prn     ORDER FOR DME BIPAP 20/11       ORDER FOR DME Blood pressure monitor device. 1 Device 1     Respiratory Therapy Supplies (NEBULIZER/TUBING/MOUTHPIECE) KIT 1 kit as needed. 1 kit 1     ORDER FOR DME Oxygen 2 lits/min continuous. From [employer] 1 Device 1       ROS:   Constitutional: No fever, chills, or sweats. No weight gain/loss.   ENT: No visual disturbance, ear ache, epistaxis, sore throat.   Allergies/Immunologic: Negative.   Respiratory: No cough, hemoptysis.   Cardiovascular: As per HPI.   GI: No nausea, vomiting, hematemesis, melena, or hematochezia.   : No urinary frequency, dysuria, or hematuria.   Integument: Negative.   Psychiatric: Negative.   Neuro: Negative.   Endocrinology: Negative.   Musculoskeletal: Negative.    EXAM:  There were no vitals taken for this visit.  Home weight  General: appears comfortable, alert and articulate  Head: normocephalic, atraumatic  Eyes: anicteric sclera, EOMI  Neck: no adenopathy  Orophyarynx: moist mucosa, no lesions, dentition intact  Heart: regular, S1/S2, no murmur, gallop, rub, estimated JVP ***  Lungs: clear, no rales or wheezing  Abdomen: soft, non-tender, bowel sounds present, no hepatosplenomegaly  Extremities: no clubbing, cyanosis or edema  Neurological: normal speech and affect, no gross motor deficits    Labs:  CBC RESULTS:  Lab Results   Component Value Date    WBC 7.4 01/04/2017    RBC 2.76* 01/04/2017    HGB 8.0* 01/12/2017    HGB 11.5* 11/13/2008    HCT 26.7* 01/04/2017    MCV 97 01/04/2017    MCH 29.0 01/04/2017    MCHC 30.0* 01/04/2017    RDW 21.2* 01/04/2017     01/04/2017       CMP RESULTS:  Lab Results   Component Value Date    * 01/12/2017    POTASSIUM 3.6 01/12/2017    CHLORIDE 93* 01/12/2017    CO2 33*  01/12/2017    ANIONGAP 5 01/12/2017    GLC 74 01/12/2017    * 01/07/2013    BUN 58* 01/12/2017    CR 1.65* 01/12/2017    GFRESTIMATED 42* 01/12/2017    GFRESTBLACK 51* 01/12/2017    АНДРЕЙ 9.1 01/12/2017    BILITOTAL 0.2 12/19/2016    ALBUMIN 1.9* 12/19/2016    ALKPHOS 249* 12/19/2016    ALT 16 12/19/2016    AST 40 12/19/2016        INR RESULTS:  Lab Results   Component Value Date    INR 1.40* 12/22/2016    INR 1.3* 10/11/2016    INR 1.3* 04/12/2011       No components found for: CK  Lab Results   Component Value Date    MAG 2.1 01/04/2017     Lab Results   Component Value Date    NTBNP 2017* 03/21/2016       Most recent echocardiogram:      Assessment and Plan:     1.  Chronic *** heart failure secondary to ***.  Stage {UMPCARDSTAGE:081024778}  NYHA Class {UMPCARDSYMP:973266463}    ACEi/ARB: {UMPCARDACEI/ARB:375562094}    BB: {UMPCARDACEI/ARB:964749046}    Aldosterone antagonist: {UMPCARDBB:792817662}    SCD prophylaxis: {UMPCARDSCD:421701582}    Fluid status: {UMPCARDFLUID:969935470}    2.  Other comordbid conditions:    3.  Follow-up ***      SHWETA Peck CNP  Pager [phone]                Please do not hesitate to contact me if you have any questions/concerns.     Sincerely,     SWHETA Gasca CNP

## 2017-01-18 NOTE — PROGRESS NOTES
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 EF has been low as far back as 2006 when it was 40-45%, and it was most recently 35-40% on 11/4/16. His MitraClip devices appear stable and he has residual mild to moderate mitral insufficiency. His most recent coronary angiogram showed  of the pRCA just distal to his stent. He has had 6 heart failure hospitalizations in the last year, and has been in a TCU since then (about 2 months). Presents to clinic alone today in a wheelchair from the TCU he has been staying in for continued heart failure follow-up.     Scott was very sleepy throughout the interview and kept nodding off to sleep. Said that he is feeling 'ok' since leaving the hospital, but was unable to provide much information. He does not know what his weights or blood pressures have been like, but he doesn't feel as swollen as he used to. Denies any SOB, remains on continuous oxygen via nasal cannula. Denies any dizziness, chest pain, or palpitations. Says that his energy level has been low and he continues to feel weak, and is only able to walk very short distances with a walker.    PAST MEDICAL HISTORY:  Past Medical History   Diagnosis Date     Thyrotoxicosis without mention of goiter or other cause, without mention of thyrotoxic crisis or storm      s/p GARRETT     Pain in limb      Chronic Pain     Pneumonia 2/24/2009     OTHER LUNG DISEASE NEC      3/25/06: CT chest showing right hilar and mediastinal adenopathy-stable 7/09     Diabetes mellitus (H)      CHF exacerbation (H)      Atrial fibrillation (H)      ablation 1-     Brain aneurysm 12/2/2010     S/p clipping of 3 aneurysm 8/10       Cerebral aneurysm 7/20/2010     Other specified forms of chronic ischemic  heart disease      3/06: EF 45% at time of stent in RCA     Unspecified Peripheral Vascular Disease 9/6/2007     S/p Right femoral above-knee popliteal bypass utilizing in situ right greater saphenous vein 11/07      Cor athrscl-uns vessel      3/06: NSTEMI in setting of paroxysmal a-fib with RVR.  Stent to RCA.  Echo with EF 45%. Angio on 7/2/09 showed widely patent stent with moderate focal eccentric stenosis at distal stent edge.. Mid rca 60%, proximal rca 50% 1 st om 20% ,  Mid lad 40% --Intracronary measeuement of mid rca showed FFR of . 94      PVCs (premature ventricular contractions) 12/2/2010     S/p ablation of the PVC's 11/10   (Problem list name updated by automated process. Provider to review and confirm.)     Abdominal aneurysm without mention of rupture 10/12/2005     repaired 2000     Chronic pain 10/12/2005     has chronic pain right foot compartment syndrome requiring narcotic  Methadone 10 mg (75/month) Pain contract signed with marciano Castrejon      CKD (chronic kidney disease) stage 3, GFR 30-59 ml/min 6/15/2011     Community acquired bacterial pneumonia 11/7/2011     Type 2 diabetes, HbA1C goal < 8% (H) 3/2/2011     VANDA-Severe (AHI 67) 2/16/2009     Sleep study  2/12/09 to evaluate poor quality of sleep, unrefreshed sleep, abnormal nocturnal oximetry. XHC983.5 minutes,  latency prolonged 28.3 minutes. Sleep efficiency reduced 81.2%.  Architecture disrupted with sleep stage changes and arousals. Snoring: very loud. RDI 72.4 and AHI 66.9. REM RDI 80.0.  LO2 73.0%. CPAP Titration:improvement; but not elimination of apneas, hypopneas, desaturatio     COPD (chronic obstructive pulmonary disease) (H) 5/31/2011     Unspecified anemia 4/24/2008     No etiology found 2006.  Had hematuria-  Urology eval with cysto- unremarkable     Other specified acquired hypothyroidism      s/p GARRETT     MRSA      SITE - ARM WOUND 2/27/04     Obese        FAMILY HISTORY:  Family History   Problem Relation Age of  Onset     HEART DISEASE Mother      Respiratory Father        SOCIAL HISTORY:  Social History     Social History     Marital Status: Single     Spouse Name: N/A     Number of Children: N/A     Years of Education: N/A     Social History Main Topics     Smoking status: Former Smoker -- 0.25 packs/day for 20 years     Types: Cigarettes     Quit date: 03/06/2009     Smokeless tobacco: Never Used     Alcohol Use: No     Drug Use: No     Sexual Activity:     Partners: Female     Birth Control/ Protection: Surgical     Other Topics Concern     Parent/Sibling W/ Cabg, Mi Or Angioplasty Before 65f 55m? No     Social History Narrative       CURRENT MEDICATIONS:  Current Outpatient Prescriptions   Medication Sig Dispense Refill     HYDRALAZINE HCL PO Take 10 mg by mouth 3 times daily Do not hold for systolic blood pressure, only hold for MAP < 65. MAP=[(2x diastolic)+systolic]/3.       morphine 0.1% in solosite topical gel Place onto the skin 4 times daily as needed Apply 1 gm topically to LLE for pain four times daily as needed       metoprolol (TOPROL-XL) 25 MG 24 hr tablet Take 0.5 tablets (12.5 mg) by mouth daily Do not hold for systolic blood pressure, only hold for mean arterial pressure (MAP) < 65 or HR <60.   MAP = [(2x diastolic) + systolic] / 3 30 tablet      Melatonin 10 MG TABS tablet Take 1 tablet (10 mg) by mouth At Bedtime       methadone (DOLOPHINE) 10 MG tablet Take 1 tablet (10 mg) by mouth 3 times daily 90 tablet 0     acetaminophen (TYLENOL) 500 MG tablet Take 2 tablets (1,000 mg) by mouth every 6 hours as needed for mild pain or fever       aspirin 81 MG chewable tablet Take 1 tablet (81 mg) by mouth daily 36 tablet      magnesium oxide (MAG-OX) 400 MG tablet Take 1 tablet (400 mg) by mouth daily 30 tablet 3     isosorbide dinitrate (ISORDIL) 20 MG tablet Take 1 tablet (20 mg) by mouth 3 times daily (before meals) Do not hold for systolic blood pressure, only hold for mean arterial pressure (MAP) < 65.    MAP = [(2x diastolic) + systolic] / 3       amiodarone (PACERONE/CODARONE) 200 MG tablet Take 1 tablet (200 mg) by mouth daily 60 tablet 3     albuterol (PROAIR HFA/PROVENTIL HFA/VENTOLIN HFA) 108 (90 BASE) MCG/ACT Inhaler Inhale 2 puffs into the lungs every 6 hours as needed for shortness of breath / dyspnea or wheezing 1 Inhaler 0     mometasone-formoterol (DULERA) 200-5 MCG/ACT oral inhaler Inhale 2 puffs into the lungs 2 times daily       tiotropium (SPIRIVA) 18 MCG capsule Inhale 1 capsule (18 mcg) into the lungs daily 30 capsule      gabapentin (NEURONTIN) 100 MG capsule Take 2 capsules (200 mg) by mouth At Bedtime 90 capsule 3     atorvastatin (LIPITOR) 20 MG tablet Take 1 tablet (20 mg) by mouth daily 30 tablet 3     guaiFENesin (MUCINEX) 600 MG 12 hr tablet Take 1 tablet (600 mg) by mouth 2 times daily 28 tablet 3     sodium chloride (OCEAN) 0.65 % nasal spray Spray 2 sprays into both nostrils every hour as needed for congestion 1 Bottle 0     lidocaine (LIDODERM) 5 % Patch Place 1 patch onto the skin every 24 hours To chest for for chest pain. 30 patch      torsemide (DEMADEX) 100 MG tablet Take 1 tablet (100 mg) by mouth 2 times daily Do not hold for systolic blood pressure, only hold for mean arterial pressure (MAP) < 65.   MAP = [(2x diastolic) + systolic] / 3       allopurinol (ZYLOPRIM) 300 MG tablet Take 1 tablet (300 mg) by mouth daily 30 tablet 3     ferrous gluconate (FERGON) 324 (38 FE) MG tablet Take 1 tablet (324 mg) by mouth 2 times daily 30 tablet 3     bisacodyl (DULCOLAX) 5 MG EC tablet Take 1 tablet (5 mg) by mouth daily 60 tablet      bisacodyl (DULCOLAX) 10 MG Suppository Place 1 suppository (10 mg) rectally daily as needed for constipation 30 suppository      polyethylene glycol (MIRALAX) powder Take 17 g by mouth 2 times daily 119 g      senna-docusate (SENOKOT-S;PERICOLACE) 8.6-50 MG per tablet Take 2 tablets by mouth 2 times daily Hold for diarrhea. 100 tablet      calcium carb  1250 mg, 500 mg [place],/vitamin D 200 units (OSCAL WITH D) 500-200 MG-UNIT per tablet Take 1 tablet by mouth 2 times daily (with meals) 60 tablet 3     potassium chloride SA (K-DUR/KLOR-CON M) 20 MEQ CR tablet Take 2 tablets (40 mEq) by mouth 2 times daily 90 tablet      multivitamin, therapeutic with minerals (MULTI-VITAMIN) TABS tablet Take 1 tablet by mouth daily 30 each 0     levothyroxine (SYNTHROID/LEVOTHROID) 175 MCG tablet Take 1 tablet (175 mcg) by mouth every morning (before breakfast) 30 tablet      pantoprazole (PROTONIX) 40 MG EC tablet Take 1 tablet (40 mg) by mouth every morning 30 tablet      order for DME Equipment being ordered: Oxygen 2-3L/min 1 Can 0     order for DME Equipment being ordered: Xerofoam gauze 6x8 sheets.  For a total of 5 large wounds, daily change 90 each 1     order for DME Equipment being ordered: 4x4 gauze, sterile gauze 4 Box 1     order for DME Equipment being ordered: wound cleanser 1 Bottle 1     order for DME Equipment being ordered: Oxygen    Use 2-3 L via NC 1 Bottle 0     ORDER FOR DME Equipment being ordered: surgical shoe 1 Device 0     blood glucose monitoring (ACCU-CHEK MULTICLIX) lancets Use to test blood sugar 4 times daily or as directed. 1 Box prn     ORDER FOR DME BIPAP 20/11       ORDER FOR DME Blood pressure monitor device. 1 Device 1     Respiratory Therapy Supplies (NEBULIZER/TUBING/MOUTHPIECE) KIT 1 kit as needed. 1 kit 1     ORDER FOR DME Oxygen 2 lits/min continuous. From [facility] 1 Device 1       ROS:   Constitutional: No fever, chills, or sweats. No weight gain/loss.   ENT: No visual disturbance, ear ache, epistaxis, sore throat.   Allergies/Immunologic: Negative.   Respiratory: No cough, hemoptysis.   Cardiovascular: As per HPI.   GI: No nausea, vomiting, hematemesis, melena, or hematochezia.   : No urinary frequency, dysuria, or hematuria.   Integument: Negative.   Psychiatric: Negative.   Neuro: Negative.   Endocrinology: Negative.   Musculoskeletal:  Negative.    EXAM:  BP 94/60  Pulse 91  SpO2 94%  General: appears comfortable, flat affect, in a wheelchair and wearing oxygen  Head: normocephalic, atraumatic  Eyes: anicteric sclera, EOMI  Neck: no adenopathy  Orophyarynx: moist mucosa, no lesions, dentition intact  Heart: regular, S1/S2, no murmur, gallop, rub, estimated JVP 8 cm  Lungs: clear, no rales or wheezing  Abdomen: soft, non-tender, bowel sounds present, no hepatosplenomegaly  Extremities: no clubbing, cyanosis or edema  Neurological: normal speech and affect, no gross motor deficits    Labs:  CBC RESULTS:  Lab Results   Component Value Date    WBC 7.4 01/04/2017    RBC 2.76* 01/04/2017    HGB 8.0* 01/12/2017    HGB 11.5* 11/13/2008    HCT 26.7* 01/04/2017    MCV 97 01/04/2017    MCH 29.0 01/04/2017    MCHC 30.0* 01/04/2017    RDW 21.2* 01/04/2017     01/04/2017       CMP RESULTS:  Lab Results   Component Value Date    * 01/12/2017    POTASSIUM 3.6 01/12/2017    CHLORIDE 93* 01/12/2017    CO2 33* 01/12/2017    ANIONGAP 5 01/12/2017    GLC 74 01/12/2017    * 01/07/2013    BUN 58* 01/12/2017    CR 1.65* 01/12/2017    GFRESTIMATED 42* 01/12/2017    GFRESTBLACK 51* 01/12/2017    АНДРЕЙ 9.1 01/12/2017    BILITOTAL 0.2 12/19/2016    ALBUMIN 1.9* 12/19/2016    ALKPHOS 249* 12/19/2016    ALT 16 12/19/2016    AST 40 12/19/2016        INR RESULTS:  Lab Results   Component Value Date    INR 1.40* 12/22/2016    INR 1.3* 10/11/2016    INR 1.3* 04/12/2011       No components found for: CK  Lab Results   Component Value Date    MAG 2.1 01/04/2017     Lab Results   Component Value Date    NTBNP 2017* 03/21/2016       Most recent echocardiogram 11/4/16:  Interpretation Summary  Limited TTE study.  Moderately (EF 35-40%) reduced left ventricular function is present. Right venticular function cannot be assessed.  S/p trans-catheter [id number] mitral valve repair (TMVR).  Two MitraClip devices are in place. They are stable without evidence for  SLA(single leaflet attachment.) There is mild to mild to moderate mitral insufficiency. The the mean diastolic gradient is 5 mmHg at a HR of 84 bpm.  The inferior vena cava is dilated at 2.37 cm without respiratory variability,  consistent with increased right atrial pressure. The estimated right atrial pressure is > 15 mmHg.  No pericardial effusion is present.  This study was compared with the study from 10/26/2016. There has been no significant change.    Assessment and Plan:   1.  Chronic systolic heart failure secondary to ischemic cardiomyopathy.  Stage C  NYHA Class II-III.  Scott Hebert is a 66-year old male with a history of ICM, paroxysmal atrial fibrillation complicated by failed ablation with subsequent phrenic nerve injury, CKD, VANDA, CAD with previous stenting, DM, COPD, CHR, and mitral valve regurgitation with MitraClip placement on 10/11/16. Patient remains very deconditioned and does not appear to have progressed much after 2 months in the TCU, although it was difficult to assess given that he came in alone and was not very conversant. Clinic staff weren't able to get a weight today, but based on his weight from several days ago his trend appears stable. Blood pressures remain in the 90s-100s/50s range. Scott is mainly nonambulatory, but denies SOB or dizziness issues. He appears euvolemic on exam today. It seems likely that he will be residing in a rehab facility for quite some time, if not permanently. From a heart failure perspective he appears compensated and stable. Will increase his Toprol to 25mg/daily, and add on a TSH given his significant lethargy during today's visit.      ACEi/ARB: no; was previously on Lisinopril but have not restarted given persistently elevated renal function from his baseline. Will continue Hydralazine and Isordil and keep consider switching him to Lisinopril at future visit.    BB: yes; increase Toprol to 25mg daily    Aldosterone antagonist: deferred while other  medical therapy is prioritized    SCD prophylaxis: does not meet criteria for implant    Fluid status: euvolemic; continue Torsemide 100mg BID.    2. Atrial fibrillation: Continue Amiodarone, not on warfarin due to history of GI bleed.     3. CAD: Continue Toprol, Lipitor, and Aspirin.     Follow-up: RTC in one month.      SHWETA Peck CNP  Pager [phone]

## 2017-01-18 NOTE — PATIENT INSTRUCTIONS
You were seen today in the [facility] in the Cardiovascular Clinic at the [facility].     Cardiology Provider you saw during your visit today: Elizabeth Dhaliwal NP    Recommendations:   1) Increase Toprol to 25 mg daily    Follow-up:   1) 1 month with [facility]    Results:   Results for RICHI BENSON (MRN [id number]) as of 1/17/2017 18:20   Ref. Range 1/17/2017 17:56   Sodium Latest Ref Range: 133-144 mmol/L 131 (L)   Potassium Latest Ref Range: 3.4-5.3 mmol/L 4.3   Chloride Latest Ref Range:  mmol/L 92 (L)   Carbon Dioxide Latest Ref Range: 20-32 mmol/L 31   Urea Nitrogen Latest Ref Range: 7-30 mg/dL 56 (H)   Creatinine Latest Ref Range: 0.66-1.25 mg/dL 1.71 (H)   GFR Estimate Latest Ref Range: >60 mL/min/1.7m2 40 (L)   GFR Estimate If Black Latest Ref Range: >60 mL/min/1.7m2 49 (L)   Calcium Latest Ref Range: 8.5-10.1 mg/dL 8.8   Anion Gap Latest Ref Range: 3-14 mmol/L 8   Glucose Latest Ref Range: 70-99 mg/dL 124 (H)     Thank you for your visit today. If you have questions or concerns regarding today's visit, please call me.    Charlotte Jovel (Teri), RN  RN Care Coordinator  [facility]  C.O.R.E. (Cardiomyopathy Optimization Rehabilitation Education) Clinic     For emergencies please call 911.    For any scheduling needs or nursing related questions, please call [phone]. Select option #1 for the [facility] and then option #1 again for scheduling issues; select option #3 for nursing questions or concerns.     After hours, please call [phone], select option #4 and ask for the cardiologist on-call. Tell them that you are a [facility] patient at the [facility]

## 2017-01-18 NOTE — NURSING NOTE
Chief Complaint   Patient presents with     Follow Up For     CORE rtn; hospital DC 1/4/17 (GI bleed): systolic HF; EF 30-35% (RENNY 10/2016); H/O: hyptension; MR s/p MitraClip

## 2017-01-18 NOTE — NURSING NOTE
Pt alone at today's visit. Received ride to appointment from friend. Pt answers simple yes/no questions no eye contact.  Labs: Patient was given results of the laboratory testing obtained today. Patient was instructed to return for the next laboratory testing in 1 month prior to [facility] f/u.  Med Reconcile: Reviewed and verified all current medications with the patient. Discussed increased dose of Toprol. The updated medication list was printed and given to the patient.  Return Appointment: [facility] f/u 1 month appoint made prior to leaving clinic today.  Patient stated he understood all health information given and agreed to call with further questions or concerns. To lobby via w/c. Friend here to give ride back to facility.

## 2017-01-23 NOTE — ED AVS SNAPSHOT
[facility], Emergency Department    [address] [address]    Phone:  [phone]                                       Scott Hebert   MRN: [id number]    Department:  [facility], Emergency Department   Date of Visit:  1/23/2017           Patient Information     Date Of Birth          1950        Your diagnoses for this visit were:     Shortness of breath     Chronic ischemic heart disease     Mitral valve insufficiency, unspecified etiology     Chronic congestive heart failure, unspecified congestive heart failure type (H)        You were seen by Bashir Garvey MD.      Follow-up Information     Schedule an appointment as soon as possible for a visit with Scott Bryan    Specialty:  Family Practice    Contact information:    [facility]  [address] [id number] [phone]          Discharge Instructions       To be discharged back to [facility].  Continue current medications and treatment plan.  Also use Duoneb nebulizer treatments every 6 hours if needed for breathing concerns.  Follow up with MD.  Return if any concerns.  Your labs appear stable and echo stable also.      Future Appointments        Provider Department Dept Phone Center    2/8/2017 10:00 AM Arnold Conner MD [facility] [place] [phone] [facility]    2/21/2017 5:00 PM Lab [facility] Lab [phone] [facility]    2/21/2017 5:30 PM SHWETA Gasca [facility] [phone] [facility]    5/17/2017 4:20 PM Kayla Sepulveda MD [facility] Gastroenterology and [phone] [facility]      24 Hour Appointment Hotline       To make an appointment at any [facility], call [phone] ([phone]). If you don't have a family doctor or clinic, we will help you find one. [facility] are conveniently located to serve the needs of you and your family.             Review of your medicines      START taking         Dose / Directions Last dose taken    ipratropium - albuterol 0.5 mg/2.5 mg/3 mL 0.5-2.5 (3) MG/3ML neb solution   Commonly known as:  DUONEB   Dose:  1 vial   Quantity:  1 Box        Take 1 vial (3 mLs) by nebulization every 6 hours as needed for shortness of breath / dyspnea or other (increasing secretions)   Refills:  1          Our records show that you are taking the medicines listed below. If these are incorrect, please call your family doctor or clinic.        Dose / Directions Last dose taken    acetaminophen 500 MG tablet   Commonly known as:  TYLENOL   Dose:  1000 mg        Take 2 tablets (1,000 mg) by mouth every 6 hours as needed for mild pain or fever   Refills:  0        albuterol 108 (90 BASE) MCG/ACT Inhaler   Commonly known as:  PROAIR HFA/PROVENTIL HFA/VENTOLIN HFA   Dose:  2 puff   Quantity:  1 Inhaler        Inhale 2 puffs into the lungs every 6 hours as needed for shortness of breath / dyspnea or wheezing   Refills:  0        allopurinol 300 MG tablet   Commonly known as:  ZYLOPRIM   Dose:  300 mg   Quantity:  30 tablet        Take 1 tablet (300 mg) by mouth daily   Refills:  3        amiodarone 200 MG tablet   Commonly known as:  PACERONE/CODARONE   Dose:  200 mg   Quantity:  60 tablet        Take 1 tablet (200 mg) by mouth daily   Refills:  3        aspirin 81 MG chewable tablet   Dose:  81 mg   Quantity:  36 tablet        Take 1 tablet (81 mg) by mouth daily   Refills:  0        atorvastatin 20 MG tablet   Commonly known as:  LIPITOR   Dose:  20 mg   Quantity:  30 tablet        Take 1 tablet (20 mg) by mouth daily   Refills:  3        * bisacodyl 5 MG EC tablet   Commonly known as:  DULCOLAX   Dose:  5 mg   Quantity:  60 tablet        Take 1 tablet (5 mg) by mouth daily   Refills:  0        * bisacodyl 10 MG Suppository   Commonly known as:  DULCOLAX   Dose:  10 mg   Quantity:  30 suppository        Place 1 suppository (10 mg) rectally daily as needed for constipation   Refills:  0         blood glucose monitoring lancets   Quantity:  1 Box        Use to test blood sugar 4 times daily or as directed.   Refills:  prn        calcium carb 1250 mg (500 mg [place])/vitamin D 200 units 500-200 MG-UNIT per tablet   Commonly known as:  OSCAL with D   Dose:  1 tablet   Quantity:  60 tablet        Take 1 tablet by mouth 2 times daily (with meals)   Refills:  3        ferrous gluconate 324 (38 FE) MG tablet   Commonly known as:  FERGON   Dose:  324 mg   Quantity:  30 tablet        Take 1 tablet (324 mg) by mouth 2 times daily   Refills:  3        gabapentin 100 MG capsule   Commonly known as:  NEURONTIN   Dose:  200 mg   Quantity:  90 capsule        Take 2 capsules (200 mg) by mouth At Bedtime   Refills:  3        guaiFENesin 600 MG 12 hr tablet   Commonly known as:  MUCINEX   Dose:  600 mg   Quantity:  28 tablet        Take 1 tablet (600 mg) by mouth 2 times daily   Refills:  3        HYDRALAZINE HCL PO   Dose:  10 mg        Take 10 mg by mouth 3 times daily Do not hold for systolic blood pressure, only hold for MAP < 65. MAP=[(2x diastolic)+systolic]/3.   Refills:  0        isosorbide dinitrate 20 MG tablet   Commonly known as:  ISORDIL   Dose:  20 mg        Take 1 tablet (20 mg) by mouth 3 times daily (before meals) Do not hold for systolic blood pressure, only hold for mean arterial pressure (MAP) < 65.  MAP = [(2x diastolic) + systolic] / 3   Refills:  0        levothyroxine 175 MCG tablet   Commonly known as:  SYNTHROID/LEVOTHROID   Dose:  175 mcg   Indication:  Underactive Thyroid   Quantity:  30 tablet        Take 1 tablet (175 mcg) by mouth every morning (before breakfast)   Refills:  0        lidocaine 5 % Patch   Commonly known as:  LIDODERM   Dose:  1 patch   Quantity:  30 patch        Place 1 patch onto the skin every 24 hours To chest for for chest pain.   Refills:  0        magnesium oxide 400 MG tablet   Commonly known as:  MAG-OX   Dose:  400 mg   Quantity:  30 tablet        Take 1 tablet (400 mg)  by mouth daily   Refills:  3        Melatonin 10 MG Tabs tablet   Dose:  10 mg        Take 1 tablet (10 mg) by mouth At Bedtime   Refills:  0        methadone 10 MG tablet   Commonly known as:  DOLOPHINE   Dose:  10 mg   Quantity:  90 tablet        Take 1 tablet (10 mg) by mouth 3 times daily   Refills:  0        metoprolol 25 MG 24 hr tablet   Commonly known as:  TOPROL-XL   Dose:  25 mg   Quantity:  30 tablet        Take 1 tablet (25 mg) by mouth daily Do not hold for systolic blood pressure, only hold for mean arterial pressure (MAP) < 65 or HR <60.  MAP = [(2x diastolic) + systolic] / 3   Refills:  3        mometasone-formoterol 200-5 MCG/ACT oral inhaler   Commonly known as:  DULERA   Dose:  2 puff        Inhale 2 puffs into the lungs 2 times daily   Refills:  0        morphine 0.1% in solosite topical gel        Place onto the skin 4 times daily as needed Apply 1 gm topically to LLE for pain four times daily as needed   Refills:  0        multivitamin, therapeutic with minerals Tabs tablet   Dose:  1 tablet   Quantity:  30 each        Take 1 tablet by mouth daily   Refills:  0        nebulizer/tubing/mouthpiece Kit   Dose:  1 kit   Quantity:  1 kit        1 kit as needed.   Refills:  1        * order for DME        BIPAP 20/11   Refills:  0        * order for DME   Quantity:  1 Device        Oxygen 2 lits/min continuous. From [facility]   Refills:  1        * order for DME   Quantity:  1 Device        Blood pressure monitor device.   Refills:  1        * order for DME   Quantity:  1 Device        Equipment being ordered: surgical shoe   Refills:  0        * order for DME   Quantity:  1 Bottle        Equipment being ordered: Oxygen  Use 2-3 L via NC   Refills:  0        * order for DME   Quantity:  90 each        Equipment being ordered: Xerofoam gauze 6x8 sheets.  For a total of 5 large wounds, daily change   Refills:  1        * order for DME   Quantity:  4 Box        Equipment being ordered: 4x4 gauze, sterile  gauze   Refills:  1        * order for DME   Quantity:  1 Bottle        Equipment being ordered: wound cleanser   Refills:  1        * order for DME   Quantity:  1 Can        Equipment being ordered: Oxygen 2-3L/min   Refills:  0        pantoprazole 40 MG EC tablet   Commonly known as:  PROTONIX   Dose:  40 mg   Quantity:  30 tablet        Take 1 tablet (40 mg) by mouth every morning   Refills:  0        polyethylene glycol powder   Commonly known as:  MIRALAX   Dose:  17 g   Indication:  Constipation   Quantity:  119 g        Take 17 g by mouth 2 times daily   Refills:  0        potassium chloride SA 20 MEQ CR tablet   Commonly known as:  K-DUR/KLOR-CON M   Dose:  40 mEq   Quantity:  90 tablet        Take 2 tablets (40 mEq) by mouth 2 times daily   Refills:  0        senna-docusate 8.6-50 MG per tablet   Commonly known as:  SENOKOT-S;PERICOLACE   Dose:  2 tablet   Quantity:  100 tablet        Take 2 tablets by mouth 2 times daily Hold for diarrhea.   Refills:  0        sodium chloride 0.65 % nasal spray   Commonly known as:  OCEAN   Dose:  2 spray   Quantity:  1 Bottle        Spray 2 sprays into both nostrils every hour as needed for congestion   Refills:  0        tiotropium 18 MCG capsule   Commonly known as:  SPIRIVA   Dose:  18 mcg   Quantity:  30 capsule        Inhale 1 capsule (18 mcg) into the lungs daily   Refills:  0        torsemide 100 MG tablet   Commonly known as:  DEMADEX   Dose:  100 mg        Take 1 tablet (100 mg) by mouth 2 times daily Do not hold for systolic blood pressure, only hold for mean arterial pressure (MAP) < 65.  MAP = [(2x diastolic) + systolic] / 3   Refills:  0        * Notice:  This list has 11 medication(s) that are the same as other medications prescribed for you. Read the directions carefully, and ask your doctor or other care provider to review them with you.            Prescriptions were sent or printed at these locations (1 Prescription)                   Other Prescriptions  "               Printed at Department/Unit printer (1 of )         ipratropium - albuterol 0.5 mg/2.5 mg/3 mL (DUONEB) 0.5-2.5 (3) MG/3ML neb solution                Procedures and tests performed during your visit     ABO/Rh type and screen    CBC with platelets differential    Cardiac Continuous Monitoring    Comprehensive metabolic panel    EKG 12 lead    Echo limited with definity    INR    Nt probnp inpatient (BNP)    Partial thromboplastin time    Peripheral IV catheter    Pulse oximetry nursing    Troponin I    UA reflex to Microscopic and Culture    XR Chest Port 1 View      Orders Needing Specimen Collection     None      Pending Results     Date and Time Order Name Status Description    2017 0928 EKG 12 lead Preliminary             Pending Culture Results     No orders found from 2017 to 2017.            Thank you for choosing [facility]       Thank you for choosing [facility] for your care. Our goal is always to provide you with excellent care. Hearing back from our patients is one way we can continue to improve our services. Please take a few minutes to complete the written survey that you may receive in the mail after you visit with us. Thank you!        LightTable Information     LightTable lets you send messages to your doctor, view your test results, renew your prescriptions, schedule appointments and more. To sign up, go to [email or web address] . Click on \"Log in\" on the left side of the screen, which will take you to the Welcome page. Then click on \"Sign up Now\" on the right side of the page.     You will be asked to enter the access code listed below, as well as some personal information. Please follow the directions to create your username and password.     Your access code is: [email or web address]  Expires: 2/10/2017  2:46 PM     Your access code will  in 90 days. If you need help or a new code, please call your [facility] clinic or [phone].        Care EveryWhere ID     This is your " Care EveryWhere ID. This could be used by other organizations to access your [facility] medical records  [id number]        After Visit Summary       This is your record. Keep this with you and show to your community pharmacist(s) and doctor(s) at your next visit.

## 2017-01-23 NOTE — ED NOTES
Scott is a 66 year old gentleman with a history of COPD, DM 2, CAD, chronic leg wounds. Scott was sitting up in a chair at the [facility] when he became suddenly SOB with a decrease in oxygen saturation. Was wearing o2 at the time, either 2 or 3 liters. Currently o2 saturation is 99% on 3 L,  Oxymask..SOB has resolved.

## 2017-01-23 NOTE — DISCHARGE INSTRUCTIONS
To be discharged back to [facility].  Continue current medications and treatment plan.  Also use Duoneb nebulizer treatments every 6 hours if needed for breathing concerns.  Follow up with MD.  Return if any concerns.  Your labs appear stable and echo stable also.

## 2017-01-23 NOTE — ED AVS SNAPSHOT
[facility], [place], Emergency Department    [address] [place] [address]    Phone:  [phone]                                       Scott Hebert   MRN: [id number]    Department:  [facility], Emergency Department   Date of Visit:  1/23/2017           After Visit Summary Signature Page     I have received my discharge instructions, and my questions have been answered. I have discussed any challenges I see with this plan with the nurse or doctor.    ..........................................................................................................................................  Patient/Patient Representative Signature      ..........................................................................................................................................  Patient Representative Print Name and Relationship to Patient    ..................................................               ................................................  Date                                            Time    ..........................................................................................................................................  Reviewed by Signature/Title    ...................................................              ..............................................  Date                                                            Time

## 2017-01-23 NOTE — ED PROVIDER NOTES
History     Chief Complaint   Patient presents with     Shortness of Breath     HPI  Scott Hebert is a 66 year old male with a history of chronic ischemic heart disease, cerebral aneurysm, PVD, atrial fibrillation, hyperlipidemia, type II diabetes, CHF, hypertension and VANDA who presents via ambulance with shortness of breath. The patient reports that he developed worsening shortness of breath around 6:30 AM today and staff at his care facility noted decreased O2 saturation. He was on 2-3 L O2 at that time. The patient denies any fever, cough, chest pain, nausea, vomiting or abdominal pain. Currently, he reports feeling improved without any intervention. His O2 sats were 99% on 3L of oxygen upon arrival here to the ED.   Patient currently living at [facility] has history of chronic hypotension with blood pressures in the 80s there.  Currently patient feels better has no complaints.    Past Medical History   Diagnosis Date     Thyrotoxicosis without mention of goiter or other cause, without mention of thyrotoxic crisis or storm      s/p GARRETT     Pain in limb      Chronic Pain     Pneumonia 2/24/2009     OTHER LUNG DISEASE NEC      3/25/06: CT chest showing right hilar and mediastinal adenopathy-stable 7/09     Diabetes mellitus (H)      CHF exacerbation (H)      Atrial fibrillation (H)      ablation 1-     Brain aneurysm 12/2/2010     S/p clipping of 3 aneurysm 8/10       Cerebral aneurysm 7/20/2010     Other specified forms of chronic ischemic heart disease      3/06: EF 45% at time of stent in RCA     Unspecified Peripheral Vascular Disease 9/6/2007     S/p Right femoral above-knee popliteal bypass utilizing in situ right greater saphenous vein 11/07      Cor athrscl-uns vessel      3/06: NSTEMI in setting of paroxysmal a-fib with RVR.  Stent to RCA.  Echo with EF 45%. Angio on 7/2/09 showed widely patent stent with moderate focal eccentric stenosis at distal stent edge.. Mid rca 60%,  proximal rca 50% 1 st om 20% ,  Mid lad 40% --Intracronary measeuement of mid rca showed FFR of . 94      PVCs (premature ventricular contractions) 12/2/2010     S/p ablation of the PVC's 11/10   (Problem list name updated by automated process. Provider to review and confirm.)     Abdominal aneurysm without mention of rupture 10/12/2005     repaired 2000     Chronic pain 10/12/2005     has chronic pain right foot compartment syndrome requiring narcotic  Methadone 10 mg (75/month) Pain contract signed with marciano Castrejon      CKD (chronic kidney disease) stage 3, GFR 30-59 ml/min 6/15/2011     Community acquired bacterial pneumonia 11/7/2011     Type 2 diabetes, HbA1C goal < 8% (H) 3/2/2011     VANDA-Severe (AHI 67) 2/16/2009     Sleep study  2/12/09 to evaluate poor quality of sleep, unrefreshed sleep, abnormal nocturnal oximetry. WTX820.5 minutes,  latency prolonged 28.3 minutes. Sleep efficiency reduced 81.2%.  Architecture disrupted with sleep stage changes and arousals. Snoring: very loud. RDI 72.4 and AHI 66.9. REM RDI 80.0.  LO2 73.0%. CPAP Titration:improvement; but not elimination of apneas, hypopneas, desaturatio     COPD (chronic obstructive pulmonary disease) (H) 5/31/2011     Unspecified anemia 4/24/2008     No etiology found 2006.  Had hematuria-  Urology eval with cysto- unremarkable     Other specified acquired hypothyroidism      s/p GARRETT     MRSA      SITE - ARM WOUND 2/27/04     Obese        Past Surgical History   Procedure Laterality Date     C bypass graft othr,aortofemoral  2000     Aortofemoral Bypass Graft     Surgical history of -   11/07     Right femoral above-knee popliteal bypass utilizing in situ right greater saphenous vein.     Hernia repair, incisional  3-6-09     C ablation supravent arrhythmogenic focus  6/2010      Colonoscopy  2/16/2011     COLONOSCOPY performed by VALERIA SWEET at [facility]     Esophagoscopy, gastroscopy, duodenoscopy (egd), combined  2/16/2011      COMBINED ESOPHAGOSCOPY, GASTROSCOPY, DUODENOSCOPY (EGD) performed by VALERIA SWEET at [facility]     Amputate toe(s)  8/6/2014     Procedure: AMPUTATE TOE(S);  Surgeon: Yossi Graham DPM;  Location:  OR     Incision and drainage finger, combined Left 12/12/2014     Procedure: COMBINED INCISION AND DRAINAGE FINGER;  Surgeon: Wang Solis MD;  Location: [facility]     Amputate toe(s) Right 1/13/2015     Procedure: AMPUTATE TOE(S);  Surgeon: Madhav Alarcon DPM;  Location: [facility]     Laryngoscopy with biopsy(ies) N/A 7/29/2016     Procedure: LARYNGOSCOPY WITH BIOPSY(IES);  Surgeon: Fantasma Gallegos MD;  Location:  OR     Esophagoscopy N/A 7/29/2016     Procedure: ESOPHAGOSCOPY;  Surgeon: Fantasma Gallegos MD;  Location:  OR     Percutaneous mitral valve repair N/A 10/11/2016     Procedure: PERCUTANEOUS MITRAL VALVE REPAIR ANESTHESIA;  Surgeon: Rosana Balderrama MD;  Location:  OR     Esophagoscopy, gastroscopy, duodenoscopy (egd), combined N/A 11/3/2016     Procedure: COMBINED ESOPHAGOSCOPY, GASTROSCOPY, DUODENOSCOPY (EGD);  Surgeon: Imer Layne MD;  Location:  GI     Esophagoscopy, gastroscopy, duodenoscopy (egd), combined N/A 12/23/2016     Procedure: COMBINED ESOPHAGOSCOPY, GASTROSCOPY, DUODENOSCOPY (EGD), BIOPSY SINGLE OR MULTIPLE;  Surgeon: Kayla Sepulveda MD;  Location:  GI       Family History   Problem Relation Age of Onset     HEART DISEASE Mother      Respiratory Father        Social History   Substance Use Topics     Smoking status: Former Smoker -- 0.25 packs/day for 20 years     Types: Cigarettes     Quit date: 03/06/2009     Smokeless tobacco: Never Used     Alcohol Use: No     No current facility-administered medications for this encounter.     Current Outpatient Prescriptions   Medication     ipratropium - albuterol 0.5 mg/2.5 mg/3 mL (DUONEB) 0.5-2.5 (3) MG/3ML neb solution     OXYCODONE HCL PO     triamcinolone (KENALOG) 0.1 % cream      metoprolol (TOPROL-XL) 25 MG 24 hr tablet     HYDRALAZINE HCL PO     morphine 0.1% in solosite topical gel     Melatonin 10 MG TABS tablet     methadone (DOLOPHINE) 10 MG tablet     acetaminophen (TYLENOL) 500 MG tablet     aspirin 81 MG chewable tablet     magnesium oxide (MAG-OX) 400 MG tablet     isosorbide dinitrate (ISORDIL) 20 MG tablet     amiodarone (PACERONE/CODARONE) 200 MG tablet     albuterol (PROAIR HFA/PROVENTIL HFA/VENTOLIN HFA) 108 (90 BASE) MCG/ACT Inhaler     mometasone-formoterol (DULERA) 200-5 MCG/ACT oral inhaler     tiotropium (SPIRIVA) 18 MCG capsule     gabapentin (NEURONTIN) 100 MG capsule     atorvastatin (LIPITOR) 20 MG tablet     guaiFENesin (MUCINEX) 600 MG 12 hr tablet     sodium chloride (OCEAN) 0.65 % nasal spray     lidocaine (LIDODERM) 5 % Patch     torsemide (DEMADEX) 100 MG tablet     allopurinol (ZYLOPRIM) 300 MG tablet     ferrous gluconate (FERGON) 324 (38 FE) MG tablet     bisacodyl (DULCOLAX) 5 MG EC tablet     bisacodyl (DULCOLAX) 10 MG Suppository     polyethylene glycol (MIRALAX) powder     senna-docusate (SENOKOT-S;PERICOLACE) 8.6-50 MG per tablet     calcium carb 1250 mg, 500 mg [place],/vitamin D 200 units (OSCAL WITH D) 500-200 MG-UNIT per tablet     potassium chloride SA (K-DUR/KLOR-CON M) 20 MEQ CR tablet     multivitamin, therapeutic with minerals (MULTI-VITAMIN) TABS tablet     levothyroxine (SYNTHROID/LEVOTHROID) 175 MCG tablet     pantoprazole (PROTONIX) 40 MG EC tablet     order for DME     order for DME     order for DME     order for DME     order for DME     ORDER FOR DME     blood glucose monitoring (ACCU-CHEK MULTICLIX) lancets     ORDER FOR DME     ORDER FOR DME     Respiratory Therapy Supplies (NEBULIZER/TUBING/MOUTHPIECE) KIT     ORDER FOR DME        Allergies   Allergen Reactions     Transfusion (Informational Only) [Blood Transfusion Related (Informational Only)] Other (See Comments)     Patient has a history of a clinically significant antibody against  "RBC antigens.  A delay in compatible RBCs may occur.     Erythromycin Other (See Comments)     \"decreases the half life of my methadone\"      I have reviewed the Medications, Allergies, Past Medical and Surgical History, and Social History in the Epic system.    Review of Systems   Constitutional: Positive for activity change (unchanged) and fatigue (chronic). Negative for fever, chills and appetite change.   HENT: Negative for congestion, rhinorrhea, sinus pressure, trouble swallowing and voice change.    Eyes: Negative for redness and visual disturbance.   Respiratory: Positive for cough (patient notes some increasing secretions and now has cleared) and shortness of breath (resolvedpatient chronically on O2). Negative for wheezing and stridor.    Cardiovascular: Positive for leg swelling (stable). Negative for chest pain and palpitations.   Gastrointestinal: Negative for nausea, vomiting, abdominal pain and diarrhea.   Genitourinary: Negative for flank pain and difficulty urinating.   Musculoskeletal: Negative for arthralgias and neck stiffness.   Skin: Positive for color change, rash and wound.        Chronic changes of lower extremities and upper extremities with some edema no change   Neurological: Positive for weakness. Negative for seizures, syncope and headaches.   Hematological: Does not bruise/bleed easily.   Psychiatric/Behavioral: Positive for dysphoric mood and decreased concentration. Negative for confusion.   All other systems reviewed and are negative.      Physical Exam   BP: 90/51 mmHg  Heart Rate: 82  Temp: 98.8  F (37.1  C)  Resp: 14  Height: 182.9 cm (6')  Weight: 85.73 kg (189 lb)  SpO2: 99 %  Physical Exam   Constitutional: He is oriented to person, place, and time. He appears well-developed and well-nourished. He appears distressed.   Minimal distress patient on oxygen states he feels fine without any more shortness of breath   HENT:   Head: Normocephalic and atraumatic.   Dry mucous " membranes   Eyes: Conjunctivae and EOM are normal. Pupils are equal, round, and reactive to light. No scleral icterus.   Neck: Normal range of motion. Neck supple. No JVD present.   Cardiovascular: Regular rhythm.    Pulmonary/Chest: No stridor. No respiratory distress. He has rales (bibasilar crackles).   Abdominal: There is no tenderness. There is no rebound and no guarding.   Musculoskeletal: He exhibits edema and tenderness.   Neurological: He is alert and oriented to person, place, and time. He has normal reflexes. No cranial nerve deficit. Coordination normal.   nonfocal   Skin: Skin is warm and dry. No rash noted. He is not diaphoretic. No erythema. No pallor.   Psychiatric:   Flat affect   Nursing note and vitals reviewed.      ED Course     [unfilled]  Procedures     9:32 AM  The patient was seen and examined by Dr. Garvey in Room 20.           Patient value in ER.  Patient feels comfortable now.  EKG shows a left bundle branch block without changes  Portal chest x-ray done showing some interstitial markings noted consistent with some chronic pulmonary edema without significant change.  Patient given a duo neb.  Patient resting did eat here feeling fine at this point without complaints.  Review labs BNP 7893 which is fairly stable  Troponin 0.027.  Sodium 132 potassium 3.8 glucose 83 creatinine 1.8  White count 10.8 hemoglobin of 8 stable  Urinalysis negative for infection.  Bedside echo was also done here in the ER revealing ejection fraction 35-40% which is stable from previous no other abnormalities identified that were significant to contribute to his symptoms.  Patient feeling fine stable couple returning.  Did send patient home does have history of some reactive airway disease will add to his current regimen duo nebs patient agrees with plan and discharged with prescription back to [facility]       EKG Interpretation:      Interpreted by Bashir Garvey  Time reviewed: 925  Symptoms at  time of EKG: sob resolved   Rhythm: normal sinus   Rate: normal  Axis: left  Ectopy: none  Conduction: lbbb  ST Segments/ T Waves: Nonspecific ST-T wave changes  Q Waves: none  Comparison to prior: Unchanged    Clinical Impression: LBBB unchanged          Critical Care time:  none               Labs Ordered and Resulted from Time of ED Arrival Up to the Time of Departure from the ED   CBC WITH PLATELETS DIFFERENTIAL - Abnormal; Notable for the following:     RBC Count 2.62 (*)     Hemoglobin 8.0 (*)     Hematocrit 25.7 (*)     MCHC 31.1 (*)     RDW 21.1 (*)     Absolute Neutrophil 8.5 (*)     Absolute Monocytes 1.4 (*)     All other components within normal limits   PARTIAL THROMBOPLASTIN TIME - Abnormal; Notable for the following:     PTT 41 (*)     All other components within normal limits   INR - Abnormal; Notable for the following:     INR 1.36 (*)     All other components within normal limits   COMPREHENSIVE METABOLIC PANEL - Abnormal; Notable for the following:     Sodium 132 (*)     Chloride 93 (*)     Carbon Dioxide 34 (*)     Urea Nitrogen 54 (*)     Creatinine 1.80 (*)     GFR Estimate 38 (*)     GFR Estimate If Black 46 (*)     Albumin 1.8 (*)     Alkaline Phosphatase 284 (*)     All other components within normal limits   NT PROBNP INPATIENT - Abnormal; Notable for the following:     N-Terminal Pro BNP Inpatient 7893 (*)     All other components within normal limits   TROPONIN I   UA MACROSCOPIC WITH REFLEX TO MICRO AND CULTURE   PULSE OXIMETRY NURSING   CARDIAC CONTINUOUS MONITORING   PERIPHERAL IV CATHETER   ABO/RH TYPE AND SCREEN     Results for orders placed or performed during the hospital encounter of 01/23/17   XR Chest Port 1 View    Narrative    Exam:  XR CHEST PORT 1 VW, 1/23/2017 12:14 PM    History: sob    Comparison:  Chest radiograph from 12/19/2016.    Findings:  Single AP view chest was obtained. Cardiomediastinal  silhouette is stably enlarged. Pulmonary vasculature is indistinct.  The  trachea is midline. No significant change in bilateral perihilar  and diffuse interstitial opacities. Left greater than right  retrocardiac opacities. No definite pleural effusion or pneumothorax.  Visualized upper abdomen is unremarkable.      Impression    Impression:    1. No significant change in bilateral perihilar and diffuse  interstitial opacities, suggesting pulmonary edema.  2. Left greater than right retrocardiac opacities representing  atelectasis and/or consolidation.    I have personally reviewed the examination and initial interpretation  and I agree with the findings.    EDEL AL MD   CBC with platelets differential   Result Value Ref Range    WBC 10.8 4.0 - 11.0 10e9/L    RBC Count 2.62 (L) 4.4 - 5.9 10e12/L    Hemoglobin 8.0 (L) 13.3 - 17.7 g/dL    Hematocrit 25.7 (L) 40.0 - 53.0 %    MCV 98 78 - 100 fl    MCH 30.5 26.5 - 33.0 pg    MCHC 31.1 (L) 31.5 - 36.5 g/dL    RDW 21.1 (H) 10.0 - 15.0 %    Platelet Count 150 150 - 450 10e9/L    Diff Method Automated Method     % Neutrophils 78.1 %    % Lymphocytes 7.0 %    % Monocytes 12.8 %    % Eosinophils 1.6 %    % Basophils 0.2 %    % Immature Granulocytes 0.3 %    Nucleated RBCs 0 0 /100    Absolute Neutrophil 8.5 (H) 1.6 - 8.3 10e9/L    Absolute Lymphocytes 0.8 0.8 - 5.3 10e9/L    Absolute Monocytes 1.4 (H) 0.0 - 1.3 10e9/L    Absolute Eosinophils 0.2 0.0 - 0.7 10e9/L    Absolute Basophils 0.0 0.0 - 0.2 10e9/L    Abs Immature Granulocytes 0.0 0 - 0.4 10e9/L    Absolute Nucleated RBC 0.0    Partial thromboplastin time   Result Value Ref Range    PTT 41 (H) 22 - 37 sec   INR   Result Value Ref Range    INR 1.36 (H) 0.86 - 1.14   Comprehensive metabolic panel   Result Value Ref Range    Sodium 132 (L) 133 - 144 mmol/L    Potassium 3.8 3.4 - 5.3 mmol/L    Chloride 93 (L) 94 - 109 mmol/L    Carbon Dioxide 34 (H) 20 - 32 mmol/L    Anion Gap 5 3 - 14 mmol/L    Glucose 83 70 - 99 mg/dL    Urea Nitrogen 54 (H) 7 - 30 mg/dL    Creatinine 1.80 (H) 0.66 -  1.25 mg/dL    GFR Estimate 38 (L) >60 mL/min/1.7m2    GFR Estimate If Black 46 (L) >60 mL/min/1.7m2    Calcium 8.9 8.5 - 10.1 mg/dL    Bilirubin Total 0.4 0.2 - 1.3 mg/dL    Albumin 1.8 (L) 3.4 - 5.0 g/dL    Protein Total 7.6 6.8 - 8.8 g/dL    Alkaline Phosphatase 284 (H) 40 - 150 U/L    ALT 16 0 - 70 U/L    AST 31 0 - 45 U/L   Troponin I   Result Value Ref Range    Troponin I ES 0.027 0.000 - 0.045 ug/L   Nt probnp inpatient (BNP)   Result Value Ref Range    N-Terminal Pro BNP Inpatient 7893 (H) 0 - 900 pg/mL   UA reflex to Microscopic and Culture   Result Value Ref Range    Color Urine Light Yellow     Appearance Urine Clear     Glucose Urine Negative NEG mg/dL    Bilirubin Urine Negative NEG    Ketones Urine Negative NEG mg/dL    Specific Gravity Urine 1.006 1.003 - 1.035    Blood Urine Negative NEG    pH Urine 7.0 5.0 - 7.0 pH    Protein Albumin Urine Negative NEG mg/dL    Urobilinogen mg/dL Normal 0.0 - 2.0 mg/dL    Nitrite Urine Negative NEG    Leukocyte Esterase Urine Negative NEG    Source Midstream Urine    EKG 12 lead   Result Value Ref Range    Interpretation ECG Click View Image link to view waveform and result    Echo limited with definity    Narrative    [id number]  [id number]  [id number]  [id number]^[id number]^NIKKIE^ASHLYN           [facility],[place]  Echocardiography Laboratory  [address] [place] [id number]     Name: RICHI BENSON  MRN: [id number]  : 1950  Study Date: 2017 02:12 PM  Age: 66 yrs  Gender: Male  Patient Location: [facility]  Reason For Study: CHF, S/P MV clip  History: CHF, S/P MV clip  Ordering Physician: NIKKIE ROMAN  Performed By: Uma Davidson RDCS     BSA: 2.1 m2  Height: 72 in  Weight: 189 lb  BP: 86/44 mmHg  _____________________________________________________________________________  __        Procedure  Limited Portable Echo Adult. Contrast Definity. Definity (NDC #64946-894-80)  given intravenously. Patient was given 8ml mixture of 1.5ml  Definity and 8.5ml  saline. 2 ml wasted.  _____________________________________________________________________________  __        Interpretation Summary  Moderately (EF 35-40%) reduced left ventricular function is present. Biplace  LVEF of 35%.  There are mitraclips in place with no evidence of single leaflt attachement.  There is mild to moderate mitral regurgitation. The mean gradient is 3 mm Hg  at a heart rate of 72 bpm.  This study was compared with the study from 11/4/16 .  RA pressure is lower.  _____________________________________________________________________________  __        Left Ventricle  Left ventricular diastolic function is indeterminate. Moderately (EF 35-40%)  reduced left ventricular function is present. The LV diastolic function cannot  be evaluated.     Right Ventricle  Global right ventricular function is normal. Mild right ventricular dilation  is present.     Atria  Severe left atrial enlargement is present. Severe right atrial enlargement is  present.        Mitral Valve  There are mitraclips in place with no evidence of single leaflt attachement.  There is mild to moderate mitral regurgitation. The mean gradient is 3 mm Hg  at a heart rate of 72 bpm.     Aortic Valve  Aortic valve is normal in structure and function. Trace aortic insufficiency  is present.     Tricuspid Valve  The tricuspid valve is normal. Trace tricuspid insufficiency is present.  Pulmonary artery systolic pressure is normal. The right ventricular systolic  pressure is approximated at 26.8 mmHg plus the right atrial pressure.     Pulmonic Valve  The pulmonic valve is normal.     Vessels  The inferior vena cava was normal in size with preserved respiratory  variability. The aorta root cannot be assessed.     Pericardium  No pericardial effusion is present.        Compared to Previous Study  This study was compared with the study from 11/4/16 . RA pressure is  lower.  _____________________________________________________________________________  __     MMode/2D Measurements & Calculations  MV Diam: 4.8 cm  LVOT diam: 2.0 cm  LVOT area: 3.2 cm2  LA Volume (BP): 162.0 ml  LA Volume Index (BP): 77.9 ml/m2        Doppler Measurements & Calculations  MV E max janiya: 133.8 cm/sec  MV A max janiya: 86.1 cm/sec  MV E/A: 1.6  MV max P.2 mmHg  MV mean P.8 mmHg  MV V2 VTI: 39.9 cm  MV Flow area(1diam): 17.9 cm2  MV P1/2t max janiya: 151.3 cm/sec  MV P1/2t: 82.7 msec  MVA(P1/2t): 2.7 cm2  MV dec slope: 536.2 cm/sec2  MV dec time: 0.20 sec     SV(MV 1 diam): 713.9 ml  SI(MV 1 diam): 343.3 ml/m2  TR max janiya: 258.9 cm/sec  TR max P.8 mmHg  Medial E/e': 28.2           _____________________________________________________________________________  __           Report approved by: Thu Zaldivar 2017 03:30 PM      ABO/Rh type and screen   Result Value Ref Range    ABO A     RH(D)  Pos     Antibody Screen Neg     Test Valid Only At       [facility]    Specimen Expires 2017      *Note: Due to a large number of results and/or encounters for the requested time period, some results have not been displayed. A complete set of results can be found in Results Review.         Assessments & Plan (with Medical Decision Making)  66-year-old male with history of congestive heart failure along with COPD with chronic hypotension currently at [facility].  Patient presented with shortness of breath now resolved upon arrival patient noted increasing secretions which probably causes symptoms.  He is now clear here in the ER he was evaluated cardiac-wise along with an echo all appears stable chest x-ray shows some chronic interstitial pulmonary edema his vital signs are stable he resting eating comfortable going back we did give him a duo neb and discharged him back to nursing home with a duo neb order also along with his other  inhalers and to follow-up with M.D.  Patient was stable and comfortable with this discharge plan         I have reviewed the nursing notes.    I have reviewed the findings, diagnosis, plan and need for follow up with the patient.    Discharge Medication List as of 1/23/2017  5:01 PM      START taking these medications    Details   ipratropium - albuterol 0.5 mg/2.5 mg/3 mL (DUONEB) 0.5-2.5 (3) MG/3ML neb solution Take 1 vial (3 mLs) by nebulization every 6 hours as needed for shortness of breath / dyspnea or other (increasing secretions), Disp-1 Box, R-1, Local Print             Final diagnoses:   Shortness of breath   Chronic ischemic heart disease   Mitral valve insufficiency, unspecified etiology   Chronic congestive heart failure, unspecified congestive heart failure type (H)     I, Jennifer Escalona, am serving as a trained medical scribe to document services personally performed by Bashir Garvey MD, based on the provider's statements to me.   IBashir MD, was physically present and have reviewed and verified the accuracy of this note documented by Jennifer Escalona.     1/23/2017   [facility] EMERGENCY DEPARTMENT      This note was created at least in part by the use of dragon voice dictation system. Inadvertent typographical errors may still exist.  Bashir Garvey MD.        Bashir Garvey MD  01/24/17 [id number]

## 2017-01-24 NOTE — PROGRESS NOTES
[facility]    Chief Complaint   Patient presents with     ER F/U       HPI:    Scott Hebert is a 66 year old  (1950), who is being seen today for an episodic care visit at [facility]. Hospital stay was at [facility] from 12/19/16 through 1/4/17. He was admitted for acute CHF and GI bleed related to gastropathy. Admitted to this facility for  rehab, medical management and nursing care.    Was sent to [facility] ER for eval on 1/23 and returned same day.       Today's concern is:  Shortness of breath  Around 0800 on 1/23/17, EMS were called due to Sao2 78%, dusky appearance, SOB. His symptoms resolved by the time he got to the ER. They were able to send him back to the TCU. He was under the impression that there was an error with his oxygen tank and he wasn't actually getting any oxygen, but the paramedics had difficulty getting his sats up as well. He had also reported increased secretions to the ER MD.    Chronic ischemic heart disease  Currently denies SOB, CP, cough    Gastrointestinal hemorrhage, unspecified gastrointestinal hemorrhage type  12/2016 secondary to gastropathy. Denies dark or bloody stools. Denies dyspepsia    Acute on chronic systolic congestive heart failure (H)  Weight today 186 lbs. Goal weight is 186-188 lbs. Ongoing edema. No SOB. He says he tries to follow the fluid restriction. He has been seen adding salt to foods.    Anemia, unspecified type  HEMOGLOBIN   Date Value Ref Range Status   01/23/2017 8.0* 13.3 - 17.7 g/dL Final   01/12/2017 8.0* 14.0 - 18.0 g/dL Final   11/13/2008 11.5* 13.2 - 17.1 g/dL        Urinary retention  Denies dysuria, hematuria, retention. Wears condom cath at night. Uses urinal during day.    CKD (chronic kidney disease) stage 3, GFR 30-59 ml/min  Baseline creatinine around 2  CREATININE   Date Value Ref Range Status   01/23/2017 1.80* 0.66 - 1.25 mg/dL Final       Chronic obstructive pulmonary  disease, unspecified COPD type (H)  Due to nerve damage during an ablation about 6 years ago. Disease has been stable since then, no progression. On chronic oxygen.    Paroxysmal atrial fibrillation (H)  HR .    Chronic venous stasis dermatitis of both lower extremities  Several open areas currently. Wound nurse at [facility] is monitoring    Physical deconditioning  Severe, unable to walk at this time, he attributes this to his achilles tendon wound. His goal was to return to his house if possible. He lives alone, has stairs. Unfortunately he has been variably cooperative with therapy. 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 He has been in and out of TCUs for several months. [employer] was saying they will no longer cover therapy as of 1/16/17, he and his sister are appealing this.    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 Again, he is not routinely compliant with his fluid restriction       ALLERGIES: Transfusion (informational only) and Erythromycin  Past Medical, Surgical, Family and Social History reviewed and updated in [employer].    Current Outpatient Prescriptions   Medication Sig Dispense Refill     OXYCODONE HCL PO 2.5 mg 2 times daily as needed       triamcinolone (KENALOG) 0.1 % cream Apply topically 2 times daily Apply to rash on thighs       ipratropium - albuterol 0.5 mg/2.5 mg/3 mL (DUONEB) 0.5-2.5 (3) MG/3ML neb solution Take 1 vial (3 mLs) by nebulization every 6 hours as needed for shortness of breath / dyspnea or other (increasing secretions) 1 Box 1     metoprolol (TOPROL-XL) 25 MG 24 hr tablet Take 1 tablet (25 mg) by mouth daily Do not hold for systolic blood pressure, only hold for mean arterial pressure (MAP) <  65 or HR <60.   MAP = [(2x diastolic) + systolic] / 3 30 tablet 3     HYDRALAZINE HCL PO Take 10 mg by mouth 3 times daily Do not hold for systolic blood pressure, only hold for MAP < 65. MAP=[(2x diastolic)+systolic]/3.       morphine 0.1% in solosite topical gel Place onto the skin 4 times daily as needed Apply 1 gm topically to LLE for pain four times daily as needed       Melatonin 10 MG TABS tablet Take 1 tablet (10 mg) by mouth At Bedtime       methadone (DOLOPHINE) 10 MG tablet Take 1 tablet (10 mg) by mouth 3 times daily 90 tablet 0     acetaminophen (TYLENOL) 500 MG tablet Take 2 tablets (1,000 mg) by mouth every 6 hours as needed for mild pain or fever       aspirin 81 MG chewable tablet Take 1 tablet (81 mg) by mouth daily 36 tablet      magnesium oxide (MAG-OX) 400 MG tablet Take 1 tablet (400 mg) by mouth daily 30 tablet 3     isosorbide dinitrate (ISORDIL) 20 MG tablet Take 1 tablet (20 mg) by mouth 3 times daily (before meals) Do not hold for systolic blood pressure, only hold for mean arterial pressure (MAP) < 65.   MAP = [(2x diastolic) + systolic] / 3       amiodarone (PACERONE/CODARONE) 200 MG tablet Take 1 tablet (200 mg) by mouth daily 60 tablet 3     albuterol (PROAIR HFA/PROVENTIL HFA/VENTOLIN HFA) 108 (90 BASE) MCG/ACT Inhaler Inhale 2 puffs into the lungs every 6 hours as needed for shortness of breath / dyspnea or wheezing 1 Inhaler 0     mometasone-formoterol (DULERA) 200-5 MCG/ACT oral inhaler Inhale 2 puffs into the lungs 2 times daily       tiotropium (SPIRIVA) 18 MCG capsule Inhale 1 capsule (18 mcg) into the lungs daily 30 capsule      gabapentin (NEURONTIN) 100 MG capsule Take 2 capsules (200 mg) by mouth At Bedtime 90 capsule 3     atorvastatin (LIPITOR) 20 MG tablet Take 1 tablet (20 mg) by mouth daily 30 tablet 3     guaiFENesin (MUCINEX) 600 MG 12 hr tablet Take 1 tablet (600 mg) by mouth 2 times daily 28 tablet 3     sodium chloride (OCEAN) 0.65 % nasal spray Spray 2 sprays into  both nostrils every hour as needed for congestion 1 Bottle 0     lidocaine (LIDODERM) 5 % Patch Place 1 patch onto the skin every 24 hours To chest for for chest pain. 30 patch      torsemide (DEMADEX) 100 MG tablet Take 1 tablet (100 mg) by mouth 2 times daily Do not hold for systolic blood pressure, only hold for mean arterial pressure (MAP) < 65.   MAP = [(2x diastolic) + systolic] / 3       allopurinol (ZYLOPRIM) 300 MG tablet Take 1 tablet (300 mg) by mouth daily 30 tablet 3     ferrous gluconate (FERGON) 324 (38 FE) MG tablet Take 1 tablet (324 mg) by mouth 2 times daily 30 tablet 3     bisacodyl (DULCOLAX) 5 MG EC tablet Take 1 tablet (5 mg) by mouth daily 60 tablet      bisacodyl (DULCOLAX) 10 MG Suppository Place 1 suppository (10 mg) rectally daily as needed for constipation 30 suppository      polyethylene glycol (MIRALAX) powder Take 17 g by mouth 2 times daily 119 g      senna-docusate (SENOKOT-S;PERICOLACE) 8.6-50 MG per tablet Take 2 tablets by mouth 2 times daily Hold for diarrhea. 100 tablet      calcium carb 1250 mg, 500 mg [place],/vitamin D 200 units (OSCAL WITH D) 500-200 MG-UNIT per tablet Take 1 tablet by mouth 2 times daily (with meals) 60 tablet 3     potassium chloride SA (K-DUR/KLOR-CON M) 20 MEQ CR tablet Take 2 tablets (40 mEq) by mouth 2 times daily 90 tablet      multivitamin, therapeutic with minerals (MULTI-VITAMIN) TABS tablet Take 1 tablet by mouth daily 30 each 0     levothyroxine (SYNTHROID/LEVOTHROID) 175 MCG tablet Take 1 tablet (175 mcg) by mouth every morning (before breakfast) 30 tablet      pantoprazole (PROTONIX) 40 MG EC tablet Take 1 tablet (40 mg) by mouth every morning 30 tablet      order for DME Equipment being ordered: Oxygen 2-3L/min 1 Can 0     order for DME Equipment being ordered: Xerofoam gauze 6x8 sheets.  For a total of 5 large wounds, daily change 90 each 1     order for DME Equipment being ordered: 4x4 gauze, sterile gauze 4 Box 1     order for DME Equipment  being ordered: wound cleanser 1 Bottle 1     order for DME Equipment being ordered: Oxygen    Use 2-3 L via NC 1 Bottle 0     ORDER FOR DME Equipment being ordered: surgical shoe 1 Device 0     blood glucose monitoring (ACCU-CHEK MULTICLIX) lancets Use to test blood sugar 4 times daily or as directed. 1 Box prn     ORDER FOR DME BIPAP 20/11       ORDER FOR DME Blood pressure monitor device. 1 Device 1     Respiratory Therapy Supplies (NEBULIZER/TUBING/MOUTHPIECE) KIT 1 kit as needed. 1 kit 1     ORDER FOR DME Oxygen 2 lits/min continuous. From [employer] 1 Device 1         REVIEW OF SYSTEMS:  10 point ROS of systems including Constitutional, Eyes, Respiratory, Cardiovascular, Gastroenterology, Genitourinary, Integumentary, Muscularskeletal, Psychiatric were all negative except for pertinent positives noted in my HPI.    Physical Exam:  /68 mmHg  Pulse 84  Temp(Src) 97.1  F (36.2  C)  Resp 18  Wt 186 lb 3.2 oz (84.46 kg)  SpO2 95%   GENERAL APPEARANCE:  Alert, in no distress, oriented, thin  ENT:  Mouth and posterior oropharynx normal, moist mucous membranes, normal hearing acuity  EYES:  EOM, conjunctivae, lids, pupils and irises normal  NECK:  No adenopathy,masses or thyromegaly  RESP:  respiratory effort and palpation of chest normal, lungs clear to auscultation , no respiratory distress  CV:  Palpation and auscultation of heart done , regular rate and rhythm, no murmur, rub, or gallop, edema 1+ BLE  ABDOMEN:  normal bowel sounds, soft, nontender, no hepatosplenomegaly or other masses  SKIN:  numerous small open areas on lower legs, slough present, has several bruises covering both arms, thin skin  PSYCH:  oriented X 3, normal insight, judgement and memory, affect and mood normal      Recent Labs:     CBC RESULTS:   Recent Labs   Lab Test  01/23/17   0924 01/12/17 01/04/17   0802   WBC  10.8   --   7.4   RBC  2.62*   --   2.76*   HGB  8.0*  8.0*  8.0*   HCT  25.7*   --   26.7*   MCV  98   --   97   MCH   30.5   --   29.0   MCHC  31.1*   --   30.0*   RDW  21.1*   --   21.2*   PLT  150   --   158       Last Basic Metabolic Panel:  Recent Labs   Lab Test  01/23/17   0924  01/17/17   1756   NA  132*  131*   POTASSIUM  3.8  4.3   CHLORIDE  93*  92*   АНДРЕЙ  8.9  8.8   CO2  34*  31   BUN  54*  56*   CR  1.80*  1.71*   GLC  83  124*       Liver Function Studies -   Recent Labs   Lab Test  01/23/17   0924  12/19/16   1431  11/13/08   PROTTOTAL  7.6  7.3   < >   --    ALBUMIN  1.8*  1.9*   < >   --    BILITOTAL  0.4  0.2   < >  0.3   ALKPHOS  284*  249*   < >  63   AST  31  40   < >  31   ALT  16  16   < >  35   BILIDIRECT   --    --    --   0.1    < > = values in this interval not displayed.         Assessment/Plan:  (R06.02) Shortness of breath  (primary encounter diagnosis)  Comment: Resolved. Was likely related to secretions. Nursing home staff had no choice but to send him in due to his high risk for respiratory failure.  Plan: Continue current POC with no changes at this time and adjustments as needed.    (I25.9) Chronic ischemic heart disease  Comment: Asymptomatic. Again, very high risk for acute event due to multiple chronic issues  Plan: Monitor for CP, SOB    (K92.2) Gastrointestinal hemorrhage, unspecified gastrointestinal hemorrhage type  (primary encounter diagnosis)  Comment: Due to gastropathy. No evidence of ongoing bleeding. Hgb stable  Plan: Monitor Hgb. Monitor stools    (I50.23) Acute on chronic systolic congestive heart failure (H)  Comment: Chronic. Currently: compensated.  Plan: Continue current medications: torsemide. Monitor BMP. Monitor weights q day. Call provider if greater than 2 pound gain from previous weight. Monitor for shortness of breath, wheezing, increasing lower extremity edema and change in activity tolerance.     (D64.9) Anemia, unspecified type  Comment: Due to slow blood loss from gastropathy. Hgb stable  Plan: Monitor Hgb    (R33.9) Urinary retention  Comment: Not currently an  issue  Plan: Monitor for s/sx retention    (N18.3) CKD (chronic kidney disease) stage 3, GFR 30-59 ml/min  Comment: Improved from reported baseline. Will need to monitor closely with any adjustment to diuretics.   Plan: Avoid nephrotoxic medications and adjust medications per renal function. Monitor renal function as needed.     (J44.9) Chronic obstructive pulmonary disease, unspecified COPD type (H)  Comment: Per history, this seems to be a stable, restrictive disease process. Currently controlled  Plan: Continue current POC with no changes at this time and adjustments as needed.    (I48.0) Paroxysmal atrial fibrillation (H)  Comment: Rate controlled. On ASA only for anticoagulation due to risk of bleeding  Plan: Cont amiodarone, ASA, BB. Monitor HR.     (I83.11,  I83.12) Chronic venous stasis dermatitis of both lower extremities  Comment: With open areas. At risk for infection, will need to monitor closely  Plan: Wound nurse monitoring. F/u wound clinic    (R53.81) Physical deconditioning  Comment: Severe due to acute illness on chronic debility. It seems quite unlikely at this point that he will be able to return to his home. It is reasonable that therapy would no longer be provided/reimbursed due to his extensive TCU stays with little to no progress.  Plan: Continue 24 hour skilled nursing care.    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   Plan: BGM BID.    (E87.1) Hyponatremia  Comment: Improved.   Plan: Recheck BMP next week         Electronically signed by  SHWETA Phan [facility]  Pager: [phone]

## 2017-05-02 NOTE — MR AVS SNAPSHOT
"              After Visit Summary   2017    Scott Hebert    MRN: [id number]           Patient Information     Date Of Birth          1950        Visit Information        Provider Department      2017 1:30 PM Elizabeth Dhaliwal, APRN CNP [facility]        Care Instructions    You were seen today in the Cardiovascular Clinic at the [facility].     Cardiology Providers you saw during your visit: Elizabeth Dhaliwal NP       1. Increase Torsemide to 60mg twice daily.  2. Increase potassium supplement to 20meq twice daily.  3. Recheck labs (BMP) in one week at your facility.  4. Discontinue Isordil.  5. Start Lisinopril 2.5mg daily.  6. Discontinue Spironolactone.    You are scheduled to see Dr. Cuellar on  at 8:40am.       Please limit your fluid intake to 2 L (64 ounces) daily.  2 Liters a day = 8.5 cups, or 72 ounces.  Please limit your salt intake to 2 grams a day or less.    If you gain 2# in 24 hours or 5# in one week call Nidhi Lobo RN so we can adjust your medications as needed over the phone.    Please feel free to call me with any questions or concerns.      Nidhi Lobo RN BSN CHFN  [facility]  Cardiology Care Coordinator-Heart Failure Clinic    Questions and [phone].   First press #1 for the [facility] and then press #3 for \"Medical Questions\" to reach us Cardiology Nurses.     On Call Cardiologist for after hours or on weekends: [phone]   option #4 and ask to speak to the on-call Cardiologist. Inform them you are a CORE/heart failure patient at the [facility].        If you need a medication refill please contact your pharmacy.  Please allow 3 business days for your refill to be completed.  _______________________________________________________  C.O.R.E. CLINIC Cardiomyopathy, Optimization, Rehabilitation, Education   The C.O.R.E. CLINIC is a heart failure specialty clinic within the [facility] where you will work with specialized nurse practitioners dedicated to helping patients with heart failure carefully adjust medications, receive education, and learn who and when to call if symptoms develop. They specialize in helping you better understand your condition, slow the progression of your disease, improve the length and quality of your life, help you detect future heart problems before they become life threatening, and avoid hospitalizations.  As always, thank you for trusting us with your health care needs!             Follow-ups after your visit        Your next 10 appointments already scheduled     May 17, 2017  4:20 PM CDT   (Arrive by 4:05 PM)   New Patient Visit with Kayla Sepulveda MD   [facility] ([facility])    [address] [place] [address]  4th [facility] [id number] [phone]            Jun 28, 2017  8:40 AM CDT   (Arrive by 8:25 AM)   NEW HEART FAILURE with Yancy Cuellar MD   [facility] ([facility])    [address] [place] [address]  3rd [facility] [id number] [phone]              Who to contact     If you have questions or need follow up information about today's clinic visit or your schedule please contact [facility] directly at [phone].  Normal or non-critical lab and imaging results will be communicated to you by [email or web address], letter or phone within 4 business days after the clinic has received the results. If you do not hear from us within 7 days, please contact the clinic through [email or web address] or phone. If you have a critical or abnormal lab result, we will notify you by phone as soon as possible.  Submit refill requests through [employer] or call your pharmacy and they will forward the refill request to us. Please allow 3 business days for your refill to be completed.          Additional Information About Your Visit        [employer] Information     [employer] lets  "you send messages to your doctor, view your test results, renew your prescriptions, schedule appointments and more. To sign up, go to [email or web address] . Click on \"Log in\" on the left side of the screen, which will take you to the Welcome page. Then click on \"Sign up Now\" on the right side of the page.     You will be asked to enter the access code listed below, as well as some personal information. Please follow the directions to create your username and password.     Your access code is: [email or web address]  Expires: 2017  6:30 AM     Your access code will  in 90 days. If you need help or a new code, please call your [facility] clinic or [phone].        Care EveryWhere ID     This is your Care EveryWhere ID. This could be used by other organizations to access your [facility] medical records  [id number]        Your Vitals Were     Pulse Height Pulse Oximetry BMI (Body Mass Index)          86 1.829 m (6') 92% 29.63 kg/m2         Blood Pressure from Last 3 Encounters:   17 103/63   17 104/68   17 108/69    Weight from Last 3 Encounters:   17 99.1 kg (218 lb 8 oz)   17 84.5 kg (186 lb 3.2 oz)   17 85.7 kg (189 lb)              Today, you had the following     No orders found for display         Today's Medication Changes          These changes are accurate as of: 17  3:36 PM.  If you have any questions, ask your nurse or doctor.               Stop taking these medicines if you haven't already. Please contact your care team if you have questions.     triamcinolone 0.1 % cream   Commonly known as:  KENALOG                    Primary Care Provider Office Phone # Fax #    Scott Bryan [phone]       [facility] [address] [id number]        Thank you!     Thank you for choosing [facility]  for your care. Our goal is always to provide you with excellent care. Hearing back from our patients is one way we can " continue to improve our services. Please take a few minutes to complete the written survey that you may receive in the mail after your visit with us. Thank you!             Your Updated Medication List - Protect others around you: Learn how to safely use, store and throw away your medicines at www.disposemymeds.org.          This list is accurate as of: 5/2/17  3:36 PM.  Always use your most recent med list.                   Brand Name Dispense Instructions for use    acetaminophen 500 MG tablet    TYLENOL     Take 2 tablets (1,000 mg) by mouth every 6 hours as needed for mild pain or fever       albuterol 108 (90 BASE) MCG/ACT Inhaler    PROAIR HFA/PROVENTIL HFA/VENTOLIN HFA    1 Inhaler    Inhale 2 puffs into the lungs every 6 hours as needed for shortness of breath / dyspnea or wheezing       allopurinol 300 MG tablet    ZYLOPRIM    30 tablet    Take 1 tablet (300 mg) by mouth daily       amiodarone 200 MG tablet    PACERONE/CODARONE    60 tablet    Take 1 tablet (200 mg) by mouth daily       aspirin 81 MG chewable tablet     36 tablet    Take 1 tablet (81 mg) by mouth daily       atorvastatin 20 MG tablet    LIPITOR    30 tablet    Take 1 tablet (20 mg) by mouth daily       * bisacodyl 5 MG EC tablet    DULCOLAX    60 tablet    Take 1 tablet (5 mg) by mouth daily       * bisacodyl 10 MG Suppository    DULCOLAX    30 suppository    Place 1 suppository (10 mg) rectally daily as needed for constipation       blood glucose monitoring lancets     1 Box    Use to test blood sugar 4 times daily or as directed.       calcium carb 1250 mg (500 mg [place])/vitamin D 200 units 500-200 MG-UNIT per tablet    OSCAL with D    60 tablet    Take 1 tablet by mouth 2 times daily (with meals)       ferrous gluconate 324 (38 FE) MG tablet    FERGON    30 tablet    Take 1 tablet (324 mg) by mouth 2 times daily       gabapentin 100 MG capsule    NEURONTIN    90 capsule    Take 2 capsules (200 mg) by mouth At Bedtime       guaiFENesin  600 MG 12 hr tablet    MUCINEX    28 tablet    Take 1 tablet (600 mg) by mouth 2 times daily       HYDRALAZINE HCL PO      Take 10 mg by mouth 3 times daily Do not hold for systolic blood pressure, only hold for MAP < 65. MAP=[(2x diastolic)+systolic]/3.       ipratropium - albuterol 0.5 mg/2.5 mg/3 mL 0.5-2.5 (3) MG/3ML neb solution    DUONEB    1 Box    Take 1 vial (3 mLs) by nebulization every 6 hours as needed for shortness of breath / dyspnea or other (increasing secretions)       isosorbide dinitrate 20 MG tablet    ISORDIL     Take 1 tablet (20 mg) by mouth 3 times daily (before meals) Do not hold for systolic blood pressure, only hold for mean arterial pressure (MAP) < 65.  MAP = [(2x diastolic) + systolic] / 3       levothyroxine 175 MCG tablet    SYNTHROID/LEVOTHROID    30 tablet    Take 1 tablet (175 mcg) by mouth every morning (before breakfast)       lidocaine 5 % Patch    LIDODERM    30 patch    Place 1 patch onto the skin every 24 hours To chest for for chest pain.       magnesium oxide 400 MG tablet    MAG-OX    30 tablet    Take 1 tablet (400 mg) by mouth daily       Melatonin 10 MG Tabs tablet      Take 1 tablet (10 mg) by mouth At Bedtime       methadone 10 MG tablet    DOLOPHINE    90 tablet    Take 1 tablet (10 mg) by mouth 3 times daily       metoprolol 25 MG 24 hr tablet    TOPROL-XL    30 tablet    Take 1 tablet (25 mg) by mouth daily Do not hold for systolic blood pressure, only hold for mean arterial pressure (MAP) < 65 or HR <60.  MAP = [(2x diastolic) + systolic] / 3       mometasone-formoterol 200-5 MCG/ACT oral inhaler    DULERA     Inhale 2 puffs into the lungs 2 times daily       morphine 0.1% in solosite topical gel      Place onto the skin 4 times daily as needed Apply 1 gm topically to LLE for pain four times daily as needed       multivitamin, therapeutic with minerals Tabs tablet     30 each    Take 1 tablet by mouth daily       nebulizer/tubing/mouthpiece Kit     1 kit    1 kit  as needed.       * order for DME      BIPAP 20/11       * order for DME     1 Device    Oxygen 2 lits/min continuous. From [facility]       * order for DME     1 Device    Blood pressure monitor device.       * order for DME     1 Device    Equipment being ordered: surgical shoe       * order for DME     1 Bottle    Equipment being ordered: Oxygen  Use 2-3 L via NC       * order for DME     90 each    Equipment being ordered: Xerofoam gauze 6x8 sheets.  For a total of 5 large wounds, daily change       * order for DME     4 Box    Equipment being ordered: 4x4 gauze, sterile gauze       * order for DME     1 Bottle    Equipment being ordered: wound cleanser       * order for DME     1 Can    Equipment being ordered: Oxygen 2-3L/min       OXYCODONE HCL PO      2.5 mg 2 times daily as needed       pantoprazole 40 MG EC tablet    PROTONIX    30 tablet    Take 1 tablet (40 mg) by mouth every morning       polyethylene glycol powder    MIRALAX    119 g    Take 17 g by mouth 2 times daily       potassium chloride SA 20 MEQ CR tablet    K-DUR/KLOR-CON M    90 tablet    Take 2 tablets (40 mEq) by mouth 2 times daily       senna-docusate 8.6-50 MG per tablet    SENOKOT-S;PERICOLACE    100 tablet    Take 2 tablets by mouth 2 times daily Hold for diarrhea.       sodium chloride 0.65 % nasal spray    OCEAN    1 Bottle    Spray 2 sprays into both nostrils every hour as needed for congestion       tiotropium 18 MCG capsule    SPIRIVA    30 capsule    Inhale 1 capsule (18 mcg) into the lungs daily       torsemide 100 MG tablet    DEMADEX     Take 1 tablet (100 mg) by mouth 2 times daily Do not hold for systolic blood pressure, only hold for mean arterial pressure (MAP) < 65.  MAP = [(2x diastolic) + systolic] / 3       * Notice:  This list has 11 medication(s) that are the same as other medications prescribed for you. Read the directions carefully, and ask your doctor or other care provider to review them with you.

## 2017-05-02 NOTE — NURSING NOTE
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

## 2017-05-02 NOTE — NURSING NOTE
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

## 2017-05-02 NOTE — PATIENT INSTRUCTIONS
"You were seen today in the Cardiovascular Clinic at the [facility].     Cardiology Providers you saw during your visit: Elizabeth Dhaliwal NP       1. Increase Torsemide to 60mg twice daily.  2. Increase potassium supplement to 20meq twice daily.  3. Recheck labs (BMP) in one week at your facility.  4. Discontinue Isordil.  5. Start Lisinopril 2.5mg daily.  6. Discontinue Spironolactone.    You are scheduled to see Dr. Cuellar on  at 8:40am.       Please limit your fluid intake to 2 L (64 ounces) daily.  2 Liters a day = 8.5 cups, or 72 ounces.  Please limit your salt intake to 2 grams a day or less.    If you gain 2# in 24 hours or 5# in one week call Nidhi Lobo RN so we can adjust your medications as needed over the phone.    Please feel free to call me with any questions or concerns.      Nidhi Lobo RN BSN CHFN  [facility]  Cardiology Care Coordinator-Heart Failure Clinic    Questions and [phone].   First press #1 for the [facility] and then press #3 for \"Medical Questions\" to reach us Cardiology Nurses.     On Call Cardiologist for after hours or on weekends: [phone]   option #4 and ask to speak to the on-call Cardiologist. Inform them you are a CORE/heart failure patient at the [facility].        If you need a medication refill please contact your pharmacy.  Please allow 3 business days for your refill to be completed.  _______________________________________________________  C.O.R.E. CLINIC Cardiomyopathy, Optimization, Rehabilitation, Education   The C.O.R.E. CLINIC is a heart failure specialty clinic within the [facility] where you will work with specialized nurse practitioners dedicated to helping patients with heart failure carefully adjust medications, receive education, and learn who and when to call if symptoms develop. They specialize in helping you better understand your condition, slow the " progression of your disease, improve the length and quality of your life, help you detect future heart problems before they become life threatening, and avoid hospitalizations.  As always, thank you for trusting us with your health care needs!

## 2017-05-02 NOTE — LETTER
5/2/2017      RE: Scott Hebert  [address] [facility] [id number]       Dear Colleague,    Thank you for the opportunity to participate in the care of your patient, Scott Hebert, at the [facility]. Please see a copy of my visit note below.    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 EF has been low as far back as 2006 when it was 40-45%, and it was most recently 35-40% on 11/4/16. His MitraClip devices appear stable and he has residual mild to moderate mitral insufficiency. His most recent coronary angiogram showed  of the pRCA just distal to his stent. He has had 6 heart failure hospitalizations in the last year, and has been in a TCU since then and has not made much progress with his rehab. Presents to clinic alone today in a wheelchair from the TCU he has been staying in for continued heart failure follow-up.    Scott was most recently seen in our clinic in January 2017, and has been unable to return to the clinic since then due to transportation and cost issues. Per conversation with TCU staff, the patient has been hospitalized twice in the last 2 months with heart failure exacerbations. He has been in multiple TCUs, and has been residing at [facility] since February 2017.     Scott was falling asleep throughout today's visit, similar to my last encounter with him. He says that he keeps accumulating water and thinks his breathing has gotten worse. Thinks he caught a cold last week and that is making it worse. Remains on oxygen at all times. Has not walked in several months more than a few steps, and says he has gotten very weak. Doesn't know what his weights or blood pressures have  been like. Denies any dizziness, chest pain, or palpitations.     PAST MEDICAL HISTORY:  Past Medical History:   Diagnosis Date     Abdominal aneurysm without mention of rupture 10/12/2005    repaired 2000     Atrial fibrillation (H)     ablation 1-     Brain aneurysm 12/2/2010    S/p clipping of 3 aneurysm 8/10       Cerebral aneurysm 7/20/2010     CHF exacerbation (H)      Chronic pain 10/12/2005    has chronic pain right foot compartment syndrome requiring narcotic  Methadone 10 mg (75/month) Pain contract signed with marciano Castrejon      CKD (chronic kidney disease) stage 3, GFR 30-59 ml/min 6/15/2011     Community acquired bacterial pneumonia 11/7/2011     COPD (chronic obstructive pulmonary disease) (H) 5/31/2011     Cor athrscl-uns vessel     3/06: NSTEMI in setting of paroxysmal a-fib with RVR.  Stent to RCA.  Echo with EF 45%. Angio on 7/2/09 showed widely patent stent with moderate focal eccentric stenosis at distal stent edge.. Mid rca 60%, proximal rca 50% 1 st om 20% ,  Mid lad 40% --Intracronary measeuement of mid rca showed FFR of . 94      Diabetes mellitus (H)      MRSA     SITE - ARM WOUND 2/27/04     Obese      VANDA-Severe (AHI 67) 2/16/2009    Sleep study  2/12/09 to evaluate poor quality of sleep, unrefreshed sleep, abnormal nocturnal oximetry. EGD423.5 minutes,  latency prolonged 28.3 minutes. Sleep efficiency reduced 81.2%.  Architecture disrupted with sleep stage changes and arousals. Snoring: very loud. RDI 72.4 and AHI 66.9. REM RDI 80.0.  LO2 73.0%. CPAP Titration:improvement; but not elimination of apneas, hypopneas, desaturatio     OTHER LUNG DISEASE NEC     3/25/06: CT chest showing right hilar and mediastinal adenopathy-stable 7/09     Other specified acquired hypothyroidism     s/p GARRETT     Other specified forms of chronic ischemic heart disease     3/06: EF 45% at time of stent in RCA     Pain in limb     Chronic Pain     Pneumonia 2/24/2009     PVCs (premature ventricular  contractions) 12/2/2010    S/p ablation of the PVC's 11/10   (Problem list name updated by automated process. Provider to review and confirm.)     Thyrotoxicosis without mention of goiter or other cause, without mention of thyrotoxic crisis or storm     s/p GARRETT     Type 2 diabetes, HbA1C goal < 8% (H) 3/2/2011     Unspecified anemia 4/24/2008    No etiology found 2006.  Had hematuria-  Urology eval with cysto- unremarkable     Unspecified Peripheral Vascular Disease 9/6/2007    S/p Right femoral above-knee popliteal bypass utilizing in situ right greater saphenous vein 11/07        FAMILY HISTORY:  Family History   Problem Relation Age of Onset     HEART DISEASE Mother      Respiratory Father        SOCIAL HISTORY:  Social History     Social History     Marital status: Single     Spouse name: N/A     Number of children: N/A     Years of education: N/A     Social History Main Topics     Smoking status: Former Smoker     Packs/day: 0.25     Years: 20.00     Types: Cigarettes     Quit date: 3/6/2009     Smokeless tobacco: Never Used     Alcohol use No     Drug use: No     Sexual activity: Yes     Partners: Female     Birth control/ protection: Surgical     Other Topics Concern     Parent/Sibling W/ Cabg, Mi Or Angioplasty Before 65f 55m? No     Social History Narrative       CURRENT MEDICATIONS:  Current Outpatient Prescriptions   Medication Sig Dispense Refill     OXYCODONE HCL PO 2.5 mg 2 times daily as needed       ipratropium - albuterol 0.5 mg/2.5 mg/3 mL (DUONEB) 0.5-2.5 (3) MG/3ML neb solution Take 1 vial (3 mLs) by nebulization every 6 hours as needed for shortness of breath / dyspnea or other (increasing secretions) 1 Box 1     metoprolol (TOPROL-XL) 25 MG 24 hr tablet Take 0.5 tablets (12.5 mg) by mouth daily Do not hold for systolic blood pressure, only hold for mean arterial pressure (MAP) < 65 or HR <60.   MAP = [(2x diastolic) + systolic] / 3 30 tablet 3     Spironolactone Take 12.5mg daily       morphine  0.1% in solosite topical gel Place onto the skin 4 times daily as needed Apply 1 gm topically to LLE for pain four times daily as needed       Melatonin 10 MG TABS tablet Take 1 tablet (10 mg) by mouth At Bedtime       methadone (DOLOPHINE) 10 MG tablet Take 1 tablet (10 mg) by mouth 3 times daily 90 tablet 0     acetaminophen (TYLENOL) 500 MG tablet Take 2 tablets (1,000 mg) by mouth every 6 hours as needed for mild pain or fever       aspirin 81 MG chewable tablet Take 1 tablet (81 mg) by mouth daily 36 tablet      magnesium oxide (MAG-OX) 400 MG tablet Take 1 tablet (400 mg) by mouth daily 30 tablet 3     isosorbide dinitrate (ISORDIL)5 MG tablet Take 1 tablet (5 mg) by mouth 3 times daily (before meals) Do not hold for systolic blood pressure, only hold for mean arterial pressure (MAP) < 65.   MAP = [(2x diastolic) + systolic] / 3       amiodarone (PACERONE/CODARONE) 200 MG tablet Take 1 tablet (200 mg) by mouth daily 60 tablet 3     albuterol (PROAIR HFA/PROVENTIL HFA/VENTOLIN HFA) 108 (90 BASE) MCG/ACT Inhaler Inhale 2 puffs into the lungs every 6 hours as needed for shortness of breath / dyspnea or wheezing 1 Inhaler 0     mometasone-formoterol (DULERA) 200-5 MCG/ACT oral inhaler Inhale 2 puffs into the lungs 2 times daily       tiotropium (SPIRIVA) 18 MCG capsule Inhale 1 capsule (18 mcg) into the lungs daily 30 capsule      gabapentin (NEURONTIN) 100 MG capsule Take 2 capsules (200 mg) by mouth At Bedtime 90 capsule 3     atorvastatin (LIPITOR) 20 MG tablet Take 1 tablet (20 mg) by mouth daily 30 tablet 3     guaiFENesin (MUCINEX) 600 MG 12 hr tablet Take 1 tablet (600 mg) by mouth 2 times daily 28 tablet 3     sodium chloride (OCEAN) 0.65 % nasal spray Spray 2 sprays into both nostrils every hour as needed for congestion 1 Bottle 0     lidocaine (LIDODERM) 5 % Patch Place 1 patch onto the skin every 24 hours To chest for for chest pain. 30 patch      torsemide (DEMADEX) 20 MG tablet Take 2 tablets (40 mg)  by mouth 2 times daily Do not hold for systolic blood pressure, only hold for mean arterial pressure (MAP) < 65.   MAP = [(2x diastolic) + systolic] / 3       allopurinol (ZYLOPRIM) 300 MG tablet Take 1 tablet (300 mg) by mouth daily 30 tablet 3     ferrous gluconate (FERGON) 324 (38 FE) MG tablet Take 1 tablet (324 mg) by mouth 2 times daily 30 tablet 3     bisacodyl (DULCOLAX) 5 MG EC tablet Take 1 tablet (5 mg) by mouth daily 60 tablet      bisacodyl (DULCOLAX) 10 MG Suppository Place 1 suppository (10 mg) rectally daily as needed for constipation 30 suppository      polyethylene glycol (MIRALAX) powder Take 17 g by mouth 2 times daily 119 g      senna-docusate (SENOKOT-S;PERICOLACE) 8.6-50 MG per tablet Take 2 tablets by mouth 2 times daily Hold for diarrhea. 100 tablet      calcium carb 1250 mg, 500 mg [place],/vitamin D 200 units (OSCAL WITH D) 500-200 MG-UNIT per tablet Take 1 tablet by mouth 2 times daily (with meals) 60 tablet 3     potassium chloride SA (K-DUR/KLOR-CON M) 10 MEQ CR tablet Take 1 tablets (10 mEq) by mouth 2 times daily 90 tablet      multivitamin, therapeutic with minerals (MULTI-VITAMIN) TABS tablet Take 1 tablet by mouth daily 30 each 0     levothyroxine (SYNTHROID/LEVOTHROID) 175 MCG tablet Take 1 tablet (175 mcg) by mouth every morning (before breakfast) 30 tablet      pantoprazole (PROTONIX) 40 MG EC tablet Take 1 tablet (40 mg) by mouth every morning 30 tablet      order for DME Equipment being ordered: Oxygen 2-3L/min 1 Can 0     order for DME Equipment being ordered: Xerofoam gauze 6x8 sheets.  For a total of 5 large wounds, daily change 90 each 1     order for DME Equipment being ordered: 4x4 gauze, sterile gauze 4 Box 1     order for DME Equipment being ordered: wound cleanser 1 Bottle 1     order for DME Equipment being ordered: Oxygen    Use 2-3 L via NC 1 Bottle 0     ORDER FOR DME Equipment being ordered: surgical shoe 1 Device 0     blood glucose monitoring (ACCU-CHEK MULTICLIX)  lancets Use to test blood sugar 4 times daily or as directed. 1 Box prn     ORDER FOR DME BIPAP 20/11       ORDER FOR DME Blood pressure monitor device. 1 Device 1     Respiratory Therapy Supplies (NEBULIZER/TUBING/MOUTHPIECE) KIT 1 kit as needed. 1 kit 1     ORDER FOR DME Oxygen 2 lits/min continuous. From [employer] 1 Device 1       ROS:   Constitutional: No fever, chills, or sweats. No weight gain/loss.   ENT: No visual disturbance, ear ache, epistaxis, sore throat.   Allergies/Immunologic: Negative.   Respiratory: No cough, hemoptysis.   Cardiovascular: As per HPI.   GI: No nausea, vomiting, hematemesis, melena, or hematochezia.   : No urinary frequency, dysuria, or hematuria.   Integument: Negative.   Psychiatric: Negative.   Neuro: Negative.   Endocrinology: Negative.   Musculoskeletal: Negative.    EXAM:  /63 (BP Location: Right arm, Patient Position: Chair, Cuff Size: Adult Regular)  Pulse 86  Ht 1.829 m (6')  Wt 99.1 kg (218 lb 8 oz)  SpO2 92%  BMI 29.63 kg/m2  General: appears comfortable, lethargic in a wheelchair  Head: normocephalic, atraumatic  Eyes: anicteric sclera, EOMI  Neck: no adenopathy  Orophyarynx: moist mucosa, no lesions, dentition intact  Heart: regular, S1/S2, no murmur, gallop, rub, estimated JVP 12 cm  Lungs: clear with fine crackles in bilateral bases, no rales or wheezing  Abdomen: soft, non-tender, bowel sounds present, no hepatosplenomegaly  Extremities: 1+ bilateral LE edema in compression stockings  Neurological: flat affect, no gross motor deficits    Labs:  CBC RESULTS:  Lab Results   Component Value Date    WBC 10.8 01/23/2017    RBC 2.62 (L) 01/23/2017    HGB 8.0 (L) 01/23/2017    HCT 25.7 (L) 01/23/2017    MCV 98 01/23/2017    MCH 30.5 01/23/2017    MCHC 31.1 (L) 01/23/2017    RDW 21.1 (H) 01/23/2017     01/23/2017       CMP RESULTS:  Lab Results   Component Value Date     (L) 01/23/2017    POTASSIUM 3.8 01/23/2017    CHLORIDE 93 (L) 01/23/2017    CO2 34  (H) 01/23/2017    ANIONGAP 5 01/23/2017    GLC 83 01/23/2017    BUN 54 (H) 01/23/2017    CR 1.80 (H) 01/23/2017    GFRESTIMATED 38 (L) 01/23/2017    GFRESTBLACK 46 (L) 01/23/2017    АНДРЕЙ 8.9 01/23/2017    BILITOTAL 0.4 01/23/2017    ALBUMIN 1.8 (L) 01/23/2017    ALKPHOS 284 (H) 01/23/2017    ALT 16 01/23/2017    AST 31 01/23/2017        INR RESULTS:  Lab Results   Component Value Date    INR 1.36 (H) 01/23/2017       No components found for: CK  Lab Results   Component Value Date    MAG 2.1 01/04/2017     Lab Results   Component Value Date    NTBNP 2017 (H) 03/21/2016       Most recent echocardiogram 11/4/16:  Interpretation Summary  Limited TTE study.  Moderately (EF 35-40%) reduced left ventricular function is present. Right venticular function cannot be assessed.  S/p trans-catheter [id number] mitral valve repair (TMVR).  Two MitraClip devices are in place. They are stable without evidence for SLA(single leaflet attachment.) There is mild to mild to moderate mitral insufficiency. The the mean diastolic gradient is 5 mmHg at a HR of 84 bpm.  The inferior vena cava is dilated at 2.37 cm without respiratory variability,  consistent with increased right atrial pressure. The estimated right atrial pressure is > 15 mmHg.  No pericardial effusion is present.  This study was compared with the study from 10/26/2016. There has been no significant change.    Assessment and Plan:   1.  Chronic systolic heart failure secondary to ischemic cardiomyopathy.  Stage C  NYHA Class III    ACEi/ARB: no; was previously on Lisinopril but have not restarted given persistently elevated renal function from his baseline. Will continue Hydralazine and Isordil and keep consider switching him to Lisinopril at future visit.    BB: yes; increase Toprol to 25mg daily    Aldosterone antagonist: deferred while other medical therapy is prioritized    SCD prophylaxis: does not meet criteria for implant    Fluid status: euvolemic; continue Torsemide  100mg BID.    Scott Hebert is a 66-year old male with a history of ICM, paroxysmal atrial fibrillation complicated by failed ablation with subsequent phrenic nerve injury, CKD, VANDA, CAD with previous stenting, DM, COPD, CHR, and mitral valve regurgitation with MitraClip placement on 10/11/16. He has been hospitalized at several different facilities recently, and has had significant changes to his HF regimen made. He presents to clinic today for continued follow-up after last being seen 4 months ago.    Called the TCU and spoke with Michelle, an NP who has been managing Scott while he has been there. She said that his weights have fluctuated greatly as well as his diuretic doses after his multiple hospitalizations at different facilities. Scott was hospitalized twice at [facility], both in February and April 2017. His weight at the TCU has been in the 202-207# range recently. Scott's creatinine has been stable in the 1.4-1.5 range. Patient has been on a great deal of pain medications and has been quite lethargic, so his Methadone was recently stopped. Michelle said that she has talked to Scott on multiple occasions about the option to transition to a more palliative approach, so that conversation has been ongoing. She doesn't feel that Scott has made much progress, and he has not been consistently participating in therapy. Michelle and DOREEN discussed strategies to help treat Scott's heart failure better, and we exchanged contact information to talk more over the phone regarding labs and med adjustments. Michelle thought it would be reasonable for Scott to come back to our clinic in person in 1-2 months; had the patient scheduled with Dr. Cuellar on 6/28.    Also spoke with the patient's sister Mauricio, who is a physician in [place]. Updated her on Scott's heart failure treatment and the struggles there have been with his recurrent hospitalizations. She has been kept up to date by the facility and knows that Scott isn't doing  well. There have been talks with his providers there about transitioning him to palliative care unless he begins to improve. Part of the struggle has been Scott's lack of motivation to participate in any rehab, and he has admitted to her that he is depressed. I explained our plans and medication adjustments made today, and we will try to stay in touch over the phone better with the TCU staff managing Scott. In addition, Mauricio asked whether she should have Scott hospitalized at the  rather than [facility] if he has another heart failure exacerbation. I said that it would be much better for us to have continuity in his heart failure care if we managed it completely on both the inpatient and outpatient side.     In terms of medication changes today, will discontinue Spironolactone given that it is not a priority medication and he has a history of CKD. Patient has been on and off very low-dose Hydralazine and Isordil, and is currently on 5mg Isordil TID. Given that his creatinine has improved recently to 1.45 (baseline for him), will discontinue Isordil and initiate Lisinopril 2.5mg daily. He is hypervolemic on Torsemide 40mg BID, will increase this to 60mg BID as well as his potassium supplementation. Patient remains challenging to manage given his infrequent follow-up with us and hospitalizations at outside facility. NP from TCU will contact me next week with repeat labs and updates on the patient's condition to make further adjustments to his regimen as needed.    2. Atrial fibrillation: Continue Amiodarone, not on warfarin due to history of GI bleed.      3. CAD: Continue Toprol, Lipitor, and Aspirin.    Follow-up: Scheduled to see Dr. Cuellar on 6/28. Spoke with TCU staff, and will try to manage Jamaal from here as we can and talk over the phone given the difficulty in getting him here for appointments.      SHWETA Peck CNP  Pager [phone]

## 2017-05-03 NOTE — PROGRESS NOTES
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 EF has been low as far back as 2006 when it was 40-45%, and it was most recently 35-40% on 11/4/16. His MitraClip devices appear stable and he has residual mild to moderate mitral insufficiency. His most recent coronary angiogram showed  of the pRCA just distal to his stent. He has had 6 heart failure hospitalizations in the last year, and has been in a TCU since then and has not made much progress with his rehab. Presents to clinic alone today in a wheelchair from the TCU he has been staying in for continued heart failure follow-up.    Scott was most recently seen in our clinic in January 2017, and has been unable to return to the clinic since then due to transportation and cost issues. Per conversation with TCU staff, the patient has been hospitalized twice in the last 2 months with heart failure exacerbations. He has been in multiple TCUs, and has been residing at [facility] since February 2017.     Scott was falling asleep throughout today's visit, similar to my last encounter with him. He says that he keeps accumulating water and thinks his breathing has gotten worse. Thinks he caught a cold last week and that is making it worse. Remains on oxygen at all times. Has not walked in several months more than a few steps, and says he has gotten very weak. Doesn't know what his weights or blood pressures have been like. Denies any dizziness, chest pain, or palpitations.     PAST MEDICAL HISTORY:  Past Medical History:   Diagnosis Date     Abdominal aneurysm without mention of rupture 10/12/2005    repaired 2000     Atrial fibrillation (H)     ablation 1-     Brain aneurysm 12/2/2010    S/p clipping of 3 aneurysm  8/10       Cerebral aneurysm 7/20/2010     CHF exacerbation (H)      Chronic pain 10/12/2005    has chronic pain right foot compartment syndrome requiring narcotic  Methadone 10 mg (75/month) Pain contract signed with marciano Castrejon      CKD (chronic kidney disease) stage 3, GFR 30-59 ml/min 6/15/2011     Community acquired bacterial pneumonia 11/7/2011     COPD (chronic obstructive pulmonary disease) (H) 5/31/2011     Cor athrscl-uns vessel     3/06: NSTEMI in setting of paroxysmal a-fib with RVR.  Stent to RCA.  Echo with EF 45%. Angio on 7/2/09 showed widely patent stent with moderate focal eccentric stenosis at distal stent edge.. Mid rca 60%, proximal rca 50% 1 st om 20% ,  Mid lad 40% --Intracronary measeuement of mid rca showed FFR of . 94      Diabetes mellitus (H)      MRSA     SITE - ARM WOUND 2/27/04     Obese      VANDA-Severe (AHI 67) 2/16/2009    Sleep study  2/12/09 to evaluate poor quality of sleep, unrefreshed sleep, abnormal nocturnal oximetry. NGX383.5 minutes,  latency prolonged 28.3 minutes. Sleep efficiency reduced 81.2%.  Architecture disrupted with sleep stage changes and arousals. Snoring: very loud. RDI 72.4 and AHI 66.9. REM RDI 80.0.  LO2 73.0%. CPAP Titration:improvement; but not elimination of apneas, hypopneas, desaturatio     OTHER LUNG DISEASE NEC     3/25/06: CT chest showing right hilar and mediastinal adenopathy-stable 7/09     Other specified acquired hypothyroidism     s/p GARRETT     Other specified forms of chronic ischemic heart disease     3/06: EF 45% at time of stent in RCA     Pain in limb     Chronic Pain     Pneumonia 2/24/2009     PVCs (premature ventricular contractions) 12/2/2010    S/p ablation of the PVC's 11/10   (Problem list name updated by automated process. Provider to review and confirm.)     Thyrotoxicosis without mention of goiter or other cause, without mention of thyrotoxic crisis or storm     s/p GARRETT     Type 2 diabetes, HbA1C goal < 8% (H) 3/2/2011      Unspecified anemia 4/24/2008    No etiology found 2006.  Had hematuria-  Urology eval with cysto- unremarkable     Unspecified Peripheral Vascular Disease 9/6/2007    S/p Right femoral above-knee popliteal bypass utilizing in situ right greater saphenous vein 11/07        FAMILY HISTORY:  Family History   Problem Relation Age of Onset     HEART DISEASE Mother      Respiratory Father        SOCIAL HISTORY:  Social History     Social History     Marital status: Single     Spouse name: N/A     Number of children: N/A     Years of education: N/A     Social History Main Topics     Smoking status: Former Smoker     Packs/day: 0.25     Years: 20.00     Types: Cigarettes     Quit date: 3/6/2009     Smokeless tobacco: Never Used     Alcohol use No     Drug use: No     Sexual activity: Yes     Partners: Female     Birth control/ protection: Surgical     Other Topics Concern     Parent/Sibling W/ Cabg, Mi Or Angioplasty Before 65f 55m? No     Social History Narrative       CURRENT MEDICATIONS:  Current Outpatient Prescriptions   Medication Sig Dispense Refill     OXYCODONE HCL PO 2.5 mg 2 times daily as needed       ipratropium - albuterol 0.5 mg/2.5 mg/3 mL (DUONEB) 0.5-2.5 (3) MG/3ML neb solution Take 1 vial (3 mLs) by nebulization every 6 hours as needed for shortness of breath / dyspnea or other (increasing secretions) 1 Box 1     metoprolol (TOPROL-XL) 25 MG 24 hr tablet Take 0.5 tablets (12.5 mg) by mouth daily Do not hold for systolic blood pressure, only hold for mean arterial pressure (MAP) < 65 or HR <60.   MAP = [(2x diastolic) + systolic] / 3 30 tablet 3     Spironolactone Take 12.5mg daily       morphine 0.1% in solosite topical gel Place onto the skin 4 times daily as needed Apply 1 gm topically to LLE for pain four times daily as needed       Melatonin 10 MG TABS tablet Take 1 tablet (10 mg) by mouth At Bedtime       methadone (DOLOPHINE) 10 MG tablet Take 1 tablet (10 mg) by mouth 3 times daily 90 tablet 0      acetaminophen (TYLENOL) 500 MG tablet Take 2 tablets (1,000 mg) by mouth every 6 hours as needed for mild pain or fever       aspirin 81 MG chewable tablet Take 1 tablet (81 mg) by mouth daily 36 tablet      magnesium oxide (MAG-OX) 400 MG tablet Take 1 tablet (400 mg) by mouth daily 30 tablet 3     isosorbide dinitrate (ISORDIL)5 MG tablet Take 1 tablet (5 mg) by mouth 3 times daily (before meals) Do not hold for systolic blood pressure, only hold for mean arterial pressure (MAP) < 65.   MAP = [(2x diastolic) + systolic] / 3       amiodarone (PACERONE/CODARONE) 200 MG tablet Take 1 tablet (200 mg) by mouth daily 60 tablet 3     albuterol (PROAIR HFA/PROVENTIL HFA/VENTOLIN HFA) 108 (90 BASE) MCG/ACT Inhaler Inhale 2 puffs into the lungs every 6 hours as needed for shortness of breath / dyspnea or wheezing 1 Inhaler 0     mometasone-formoterol (DULERA) 200-5 MCG/ACT oral inhaler Inhale 2 puffs into the lungs 2 times daily       tiotropium (SPIRIVA) 18 MCG capsule Inhale 1 capsule (18 mcg) into the lungs daily 30 capsule      gabapentin (NEURONTIN) 100 MG capsule Take 2 capsules (200 mg) by mouth At Bedtime 90 capsule 3     atorvastatin (LIPITOR) 20 MG tablet Take 1 tablet (20 mg) by mouth daily 30 tablet 3     guaiFENesin (MUCINEX) 600 MG 12 hr tablet Take 1 tablet (600 mg) by mouth 2 times daily 28 tablet 3     sodium chloride (OCEAN) 0.65 % nasal spray Spray 2 sprays into both nostrils every hour as needed for congestion 1 Bottle 0     lidocaine (LIDODERM) 5 % Patch Place 1 patch onto the skin every 24 hours To chest for for chest pain. 30 patch      torsemide (DEMADEX) 20 MG tablet Take 2 tablets (40 mg) by mouth 2 times daily Do not hold for systolic blood pressure, only hold for mean arterial pressure (MAP) < 65.   MAP = [(2x diastolic) + systolic] / 3       allopurinol (ZYLOPRIM) 300 MG tablet Take 1 tablet (300 mg) by mouth daily 30 tablet 3     ferrous gluconate (FERGON) 324 (38 FE) MG tablet Take 1 tablet  (324 mg) by mouth 2 times daily 30 tablet 3     bisacodyl (DULCOLAX) 5 MG EC tablet Take 1 tablet (5 mg) by mouth daily 60 tablet      bisacodyl (DULCOLAX) 10 MG Suppository Place 1 suppository (10 mg) rectally daily as needed for constipation 30 suppository      polyethylene glycol (MIRALAX) powder Take 17 g by mouth 2 times daily 119 g      senna-docusate (SENOKOT-S;PERICOLACE) 8.6-50 MG per tablet Take 2 tablets by mouth 2 times daily Hold for diarrhea. 100 tablet      calcium carb 1250 mg, 500 mg [place],/vitamin D 200 units (OSCAL WITH D) 500-200 MG-UNIT per tablet Take 1 tablet by mouth 2 times daily (with meals) 60 tablet 3     potassium chloride SA (K-DUR/KLOR-CON M) 10 MEQ CR tablet Take 1 tablets (10 mEq) by mouth 2 times daily 90 tablet      multivitamin, therapeutic with minerals (MULTI-VITAMIN) TABS tablet Take 1 tablet by mouth daily 30 each 0     levothyroxine (SYNTHROID/LEVOTHROID) 175 MCG tablet Take 1 tablet (175 mcg) by mouth every morning (before breakfast) 30 tablet      pantoprazole (PROTONIX) 40 MG EC tablet Take 1 tablet (40 mg) by mouth every morning 30 tablet      order for DME Equipment being ordered: Oxygen 2-3L/min 1 Can 0     order for DME Equipment being ordered: Xerofoam gauze 6x8 sheets.  For a total of 5 large wounds, daily change 90 each 1     order for DME Equipment being ordered: 4x4 gauze, sterile gauze 4 Box 1     order for DME Equipment being ordered: wound cleanser 1 Bottle 1     order for DME Equipment being ordered: Oxygen    Use 2-3 L via NC 1 Bottle 0     ORDER FOR DME Equipment being ordered: surgical shoe 1 Device 0     blood glucose monitoring (ACCU-CHEK MULTICLIX) lancets Use to test blood sugar 4 times daily or as directed. 1 Box prn     ORDER FOR DME BIPAP 20/11       ORDER FOR DME Blood pressure monitor device. 1 Device 1     Respiratory Therapy Supplies (NEBULIZER/TUBING/MOUTHPIECE) KIT 1 kit as needed. 1 kit 1     ORDER FOR DME Oxygen 2 lits/min continuous. From  [employer] 1 Device 1       ROS:   Constitutional: No fever, chills, or sweats. No weight gain/loss.   ENT: No visual disturbance, ear ache, epistaxis, sore throat.   Allergies/Immunologic: Negative.   Respiratory: No cough, hemoptysis.   Cardiovascular: As per HPI.   GI: No nausea, vomiting, hematemesis, melena, or hematochezia.   : No urinary frequency, dysuria, or hematuria.   Integument: Negative.   Psychiatric: Negative.   Neuro: Negative.   Endocrinology: Negative.   Musculoskeletal: Negative.    EXAM:  /63 (BP Location: Right arm, Patient Position: Chair, Cuff Size: Adult Regular)  Pulse 86  Ht 1.829 m (6')  Wt 99.1 kg (218 lb 8 oz)  SpO2 92%  BMI 29.63 kg/m2  General: appears comfortable, lethargic in a wheelchair  Head: normocephalic, atraumatic  Eyes: anicteric sclera, EOMI  Neck: no adenopathy  Orophyarynx: moist mucosa, no lesions, dentition intact  Heart: regular, S1/S2, no murmur, gallop, rub, estimated JVP 12 cm  Lungs: clear with fine crackles in bilateral bases, no rales or wheezing  Abdomen: soft, non-tender, bowel sounds present, no hepatosplenomegaly  Extremities: 1+ bilateral LE edema in compression stockings  Neurological: flat affect, no gross motor deficits    Labs:  CBC RESULTS:  Lab Results   Component Value Date    WBC 10.8 01/23/2017    RBC 2.62 (L) 01/23/2017    HGB 8.0 (L) 01/23/2017    HCT 25.7 (L) 01/23/2017    MCV 98 01/23/2017    MCH 30.5 01/23/2017    MCHC 31.1 (L) 01/23/2017    RDW 21.1 (H) 01/23/2017     01/23/2017       CMP RESULTS:  Lab Results   Component Value Date     (L) 01/23/2017    POTASSIUM 3.8 01/23/2017    CHLORIDE 93 (L) 01/23/2017    CO2 34 (H) 01/23/2017    ANIONGAP 5 01/23/2017    GLC 83 01/23/2017    BUN 54 (H) 01/23/2017    CR 1.80 (H) 01/23/2017    GFRESTIMATED 38 (L) 01/23/2017    GFRESTBLACK 46 (L) 01/23/2017    АНДРЕЙ 8.9 01/23/2017    BILITOTAL 0.4 01/23/2017    ALBUMIN 1.8 (L) 01/23/2017    ALKPHOS 284 (H) 01/23/2017    ALT 16  01/23/2017    AST 31 01/23/2017        INR RESULTS:  Lab Results   Component Value Date    INR 1.36 (H) 01/23/2017       No components found for: CK  Lab Results   Component Value Date    MAG 2.1 01/04/2017     Lab Results   Component Value Date    NTBNP 2017 (H) 03/21/2016       Most recent echocardiogram 11/4/16:  Interpretation Summary  Limited TTE study.  Moderately (EF 35-40%) reduced left ventricular function is present. Right venticular function cannot be assessed.  S/p trans-catheter [id number] mitral valve repair (TMVR).  Two MitraClip devices are in place. They are stable without evidence for SLA(single leaflet attachment.) There is mild to mild to moderate mitral insufficiency. The the mean diastolic gradient is 5 mmHg at a HR of 84 bpm.  The inferior vena cava is dilated at 2.37 cm without respiratory variability,  consistent with increased right atrial pressure. The estimated right atrial pressure is > 15 mmHg.  No pericardial effusion is present.  This study was compared with the study from 10/26/2016. There has been no significant change.    Assessment and Plan:   1.  Chronic systolic heart failure secondary to ischemic cardiomyopathy.  Stage C  NYHA Class III    ACEi/ARB: no; was previously on Lisinopril but have not restarted given persistently elevated renal function from his baseline. Will continue Hydralazine and Isordil and keep consider switching him to Lisinopril at future visit.    BB: yes; increase Toprol to 25mg daily    Aldosterone antagonist: deferred while other medical therapy is prioritized    SCD prophylaxis: does not meet criteria for implant    Fluid status: euvolemic; continue Torsemide 100mg BID.    Scott Kimlivan is a 66-year old male with a history of ICM, paroxysmal atrial fibrillation complicated by failed ablation with subsequent phrenic nerve injury, CKD, VANDA, CAD with previous stenting, DM, COPD, CHR, and mitral valve regurgitation with MitraClip placement on 10/11/16. He  has been hospitalized at several different facilities recently, and has had significant changes to his HF regimen made. He presents to clinic today for continued follow-up after last being seen 4 months ago.    Called the TCU and spoke with Michelle, an NP who has been managing Scott while he has been there. She said that his weights have fluctuated greatly as well as his diuretic doses after his multiple hospitalizations at different facilities. Scott was hospitalized twice at [facility], both in February and April 2017. His weight at the TCU has been in the 202-207# range recently. Scott's creatinine has been stable in the 1.4-1.5 range. Patient has been on a great deal of pain medications and has been quite lethargic, so his Methadone was recently stopped. Michelle said that she has talked to Scott on multiple occasions about the option to transition to a more palliative approach, so that conversation has been ongoing. She doesn't feel that Scott has made much progress, and he has not been consistently participating in therapy. Michelle and I discussed strategies to help treat Scott's heart failure better, and we exchanged contact information to talk more over the phone regarding labs and med adjustments. Michelle thought it would be reasonable for Scott to come back to our clinic in person in 1-2 months; had the patient scheduled with Dr. Cuellar on 6/28.    Also spoke with the patient's sister Mauricio, who is a physician in [place]. Updated her on Scott's heart failure treatment and the struggles there have been with his recurrent hospitalizations. She has been kept up to date by the facility and knows that Scott isn't doing well. There have been talks with his providers there about transitioning him to palliative care unless he begins to improve. Part of the struggle has been Scott's lack of motivation to participate in any rehab, and he has admitted to her that he is depressed. I explained our plans and medication  adjustments made today, and we will try to stay in touch over the phone better with the TCU staff managing Scott. In addition, Mauricio asked whether she should have Scott hospitalized at the  rather than [facility] if he has another heart failure exacerbation. I said that it would be much better for us to have continuity in his heart failure care if we managed it completely on both the inpatient and outpatient side.     In terms of medication changes today, will discontinue Spironolactone given that it is not a priority medication and he has a history of CKD. Patient has been on and off very low-dose Hydralazine and Isordil, and is currently on 5mg Isordil TID. Given that his creatinine has improved recently to 1.45 (baseline for him), will discontinue Isordil and initiate Lisinopril 2.5mg daily. He is hypervolemic on Torsemide 40mg BID, will increase this to 60mg BID as well as his potassium supplementation. Patient remains challenging to manage given his infrequent follow-up with us and hospitalizations at outside facility. NP from TCU will contact me next week with repeat labs and updates on the patient's condition to make further adjustments to his regimen as needed.    2. Atrial fibrillation: Continue Amiodarone, not on warfarin due to history of GI bleed.      3. CAD: Continue Toprol, Lipitor, and Aspirin.    Follow-up: Scheduled to see Dr. Cuellar on 6/28. Spoke with TCU staff, and will try to manage Jamaal from here as we can and talk over the phone given the difficulty in getting him here for appointments.      SHWETA Peck CNP  Pager [phone]

## 2017-05-04 NOTE — LETTER
Transition Communication Hand-off for Care Transitions to Next Level of Care Provider    Name: Scott Hebert  MRN #: [id number]  Primary Care Provider: Scott Bryan     Primary Clinic: [facility] [address] [place] [id number]     Reason for Hospitalization:  Acute on chronic combined systolic and diastolic congestive heart failure (H) [I50.43]  Admit Date/Time: 2017  7:56 PM  Discharge Date: 17  Payor Source: Payor: MEDICA / Plan: MEDICA APPLAUSE IFB / Product Type: Indemnity /          Reason for Communication Hand-off Referral: Other We are sorry to inform you that Mr. Hebert is .     Sincerely,    MARIMAR Gates, ROSETTAW  [address]   Phone: [phone]  Pager: [phone]  Unit: [phone]

## 2017-05-05 PROBLEM — I50.9 HEART FAILURE (H): Status: ACTIVE | Noted: 2017-01-01

## 2017-05-05 PROBLEM — I50.23 ACUTE ON CHRONIC SYSTOLIC HEART FAILURE (H): Status: ACTIVE | Noted: 2017-01-01

## 2017-05-05 NOTE — PROGRESS NOTES
[facility] Nurse Inpatient Wound Assessment     Initial Assessment of wound(s) on pt's:   Left Heel and Left Achillis         Data:   Patient History:      per MD note(s): Chief Complaint:  Shortness of breath and cough , lethargy     History of Present Illness:   Scott Hebert is a 66 year old male with history of ischemic cardiomyopathy with EF of 35%, paroxysmal atrial fibrillation, complicated by failed ablation with subsequent phrenic nerve injury, CKD, VANDA, CAD with previous stenting, DM, COPD, CHR and mitral valve regurgitation with phoebe clip placement on 10/11/16. EF has been as low as far back as 2006 when it was 40-45% and most recently 35-40% on 11/4/16. His phoebe clip devices has been stable and he has mild to moderate mitral insufficiency. His most recent coronary angiogram showed  of the pRCA just distal to his stent. He has multiple heart failure admission since 6 months. He has been residing in nursing home with this.     He has been having ongoing lethargy, with progressive weight gain (goal weight of 180lbs and reached to 206 in TCU), for past few weeks.  He did have ongoing cough for past few days with brown colored phlegm and found to be more hypoxic needing 6l oxygen than before.   Due to above reasons he was brought to the hospital.     At the time of encounter , pt was not able to give me much information but was easily arousable and was verbalizing in between and easily drowsy  He did mention he has ongoing difficulty in breathing and cough, along with leg swelling which has been ongoing as well.  He denies any chest pain, fever, chills, headache, blurring of vision, neurological symptoms, back pain, abdominal pain, diarrhea or urinary symptoms.  [facility] here for an initial assessment of his left heel and left achillis pressure injury.      Moisture Management:  Diaper    Current Diet / Nutrition:           Active Diet Order      Low Saturated Fat Na <2400 mg            Chan Assessment and sub scores:   Chan Score  Avg: 15.5  Min: 15  Max: 16     Labs:    Recent Labs   Lab Test  05/05/17   0557  05/04/17   2038   12/28/16   0550   09/22/16   0740   ALBUMIN  2.5*  2.5*   < >   --    < >   --    HGB  8.1*  8.1*   < >  7.8*   < >  9.1*   RBC  2.62*  2.54*   < >  2.64*   < >  3.12*   WBC  5.6  5.6   < >  6.1   < >  9.0   PLT  141*  126*   < >  144*   < >  142*   INR   --   1.23*   < >   --    < >   --    A1C   --    --    --    --    --   5.7   CRP   --    --    --   71.0*   < >   --     < > = values in this interval not displayed.        Wound Assessment (location #1Left Heel):     Wound History: A stage 3 pressure injury present on admission          Wound Base: muscle,clean and moist    Specific Dimensions (length x width x depth, in cm) :4 cm x 3 cm x 0.5    Tunneling:  N/A    Undermining: N/A    Palpation of the wound bed:  normal    Slough appearance:  none    Eschar appearance:  none    Periwound Skin: intact,      Color: normal and consistent with surrounding tissue    Temperature  normal     Drainage:  . Amount: scant . Color: serosanguinous     Odor: none    Pain:  minimal , shooting    Wound Assessment (location #1Left achillis ):     Wound History: A stage 3 pressure injury present on admission            Wound Base: muscle,clean and moist    Specific Dimensions (length x width x depth, in cm) :6 cm x 2.5 cm x 0.5    Tunneling:  N/A    Undermining: N/A    Palpation of the wound bed:  normal    Slough appearance:  none    Eschar appearance:  none    Periwound Skin: intact,      Color: normal and consistent with surrounding tissue    Temperature  normal     Drainage:  . Amount: scant . Color: serosanguinous     Odor: none    Pain:  minimal , shooting          Intervention:     Patient's chart evaluated.      Wound(s) was fully assessed    Wound Care: was done:      Orders  Written    Supplies  Ordered: Mepilex    Discussed plan of care with Patient, Nurse and  Physician          Assessment:       Pressure Injury (PI) located on Left heel: 3    Status: wound  initial assessment, Stable    Present on admission     Pressure Injury (PI) located on Left achillis : 3    Status: wound  initial assessment, Stable    Present on admission        Plan:     Nursing to notify the Provider(s) and re-consult the WOC Nurse if wound(s) deteriorate(s).  Plan of care for wound located on Left Achillis and left heel: Cleanse with MicroKlenz moisten gauze   Pat dry. Cover with 4 x4 Mepilex dressing, wrap loosley with coban to prevent the dressing from rolling     Change everyday and as needed      WOC Nurse will return:Weekly     Face to face time: 45 Minutes

## 2017-05-05 NOTE — ED NOTES
"ED to Floor Handoff      S:  Scott Hebert is a 66 year old male who speaks English and lives with others,  in a nursing home  They arrived in the ED by ambulance from [facility] with a complaint of Shortness of Breath; Altered Mental Status; and Cough    Initial vitals were:   BP: (!) 124/96  Pulse: 97  Heart Rate: 93  Temp: 98.3  F (36.8  C)  Resp: 20  Height: 182.9 cm (6')  Weight: 90.7 kg (200 lb)  SpO2: 96 %  Allergies:   Allergies   Allergen Reactions     Transfusion (Informational Only) [Blood Transfusion Related (Informational Only)] Other (See Comments)     Patient has a history of a clinically significant antibody against RBC antigens.  A delay in compatible RBCs may occur.     Erythromycin Other (See Comments)     \"decreases the half life of my methadone\"   .  The meds given in the ED and their home medications are:   Current Facility-Administered Medications   Medication     No lozenges or gum should be given while patient on BIPAP     hypromellose-dextran (ARTIFICAL TEARS) ophthalmic solution 1 drop     HOLD: All Oral Medications     lactulose (CHRONULAC) solution 20 g     ipratropium - albuterol 0.5 mg/2.5 mg/3 mL (DUONEB) neb solution 3 mL     Current Outpatient Prescriptions   Medication     torsemide (DEMADEX) 20 MG tablet     potassium chloride SA (K-DUR/KLOR-CON M) 20 MEQ CR tablet     lisinopril (PRINIVIL/ZESTRIL) 2.5 MG tablet     metoprolol (TOPROL-XL) 25 MG 24 hr tablet     OXYCODONE HCL PO     ipratropium - albuterol 0.5 mg/2.5 mg/3 mL (DUONEB) 0.5-2.5 (3) MG/3ML neb solution     morphine 0.1% in solosite topical gel     Melatonin 10 MG TABS tablet     acetaminophen (TYLENOL) 500 MG tablet     aspirin 81 MG chewable tablet     magnesium oxide (MAG-OX) 400 MG tablet     amiodarone (PACERONE/CODARONE) 200 MG tablet     albuterol (PROAIR HFA/PROVENTIL HFA/VENTOLIN HFA) 108 (90 BASE) MCG/ACT Inhaler     mometasone-formoterol (DULERA) 200-5 MCG/ACT oral inhaler     tiotropium " (SPIRIVA) 18 MCG capsule     gabapentin (NEURONTIN) 100 MG capsule     atorvastatin (LIPITOR) 20 MG tablet     guaiFENesin (MUCINEX) 600 MG 12 hr tablet     sodium chloride (OCEAN) 0.65 % nasal spray     lidocaine (LIDODERM) 5 % Patch     allopurinol (ZYLOPRIM) 300 MG tablet     ferrous gluconate (FERGON) 324 (38 FE) MG tablet     bisacodyl (DULCOLAX) 5 MG EC tablet     bisacodyl (DULCOLAX) 10 MG Suppository     polyethylene glycol (MIRALAX) powder     senna-docusate (SENOKOT-S;PERICOLACE) 8.6-50 MG per tablet     calcium carb 1250 mg, 500 mg [place],/vitamin D 200 units (OSCAL WITH D) 500-200 MG-UNIT per tablet     multivitamin, therapeutic with minerals (MULTI-VITAMIN) TABS tablet     levothyroxine (SYNTHROID/LEVOTHROID) 175 MCG tablet     pantoprazole (PROTONIX) 40 MG EC tablet     order for DME     order for DME     order for DME     order for DME     order for DME     ORDER FOR DME     blood glucose monitoring (ACCU-CHEK MULTICLIX) lancets     ORDER FOR DME     ORDER FOR DME     Respiratory Therapy Supplies (NEBULIZER/TUBING/MOUTHPIECE) KIT     ORDER FOR DME     Social demographics are   Social History     Social History     Marital status: Single     Spouse name: N/A     Number of children: N/A     Years of education: N/A     Social History Main Topics     Smoking status: Former Smoker     Packs/day: 0.25     Years: 20.00     Types: Cigarettes     Quit date: 3/6/2009     Smokeless tobacco: Never Used     Alcohol use No     Drug use: No     Sexual activity: Yes     Partners: Female     Birth control/ protection: Surgical     Other Topics Concern     Parent/Sibling W/ Cabg, Mi Or Angioplasty Before 65f 55m? No     Social History Narrative       B:   The patient has been ill for 6 day(s) and during this time the symptoms have increased.  In the ED was diagnosed with   Final diagnoses:   Acute on chronic combined systolic and diastolic congestive heart failure (H)    Infection/sepsis suspected:Yes Isolation type;  Droplet   A:   In the ED these meds were given:   Medications   ipratropium - albuterol 0.5 mg/2.5 mg/3 mL (DUONEB) neb solution 3 mL (3 mLs Nebulization Given 5/4/17 2348)   No lozenges or gum should be given while patient on BIPAP (not administered)   hypromellose-dextran (ARTIFICAL TEARS) ophthalmic solution 1 drop (not administered)   HOLD: All Oral Medications (not administered)   lactulose (CHRONULAC) solution 20 g (not administered)   furosemide (LASIX) injection 40 mg (40 mg Intravenous Given 5/4/17 2238)   furosemide (LASIX) injection 80 mg (80 mg Intravenous Given 5/5/17 0131)   ipratropium - albuterol 0.5 mg/2.5 mg/3 mL (DUONEB) neb solution 3 mL (3 mLs Nebulization Given 5/5/17 0256)     Drips running?  No  Labs results   Labs Ordered and Resulted from Time of ED Arrival Up to the Time of Departure from the ED   CBC WITH PLATELETS DIFFERENTIAL - Abnormal; Notable for the following:        Result Value    RBC Count 2.54 (*)     Hemoglobin 8.1 (*)     Hematocrit 25.5 (*)     RDW 17.2 (*)     Platelet Count 126 (*)     Absolute Lymphocytes 0.7 (*)     All other components within normal limits   COMPREHENSIVE METABOLIC PANEL - Abnormal; Notable for the following:     Sodium 130 (*)     Chloride 89 (*)     Carbon Dioxide 33 (*)     Glucose 106 (*)     Urea Nitrogen 36 (*)     Creatinine 1.45 (*)     GFR Estimate 49 (*)     GFR Estimate If Black 59 (*)     Albumin 2.5 (*)     Alkaline Phosphatase 370 (*)     All other components within normal limits   INR - Abnormal; Notable for the following:     INR 1.23 (*)     All other components within normal limits   ROUTINE UA WITH MICROSCOPIC - Abnormal; Notable for the following:     Blood Urine Small (*)     Protein Albumin Urine 10 (*)     RBC Urine 13 (*)     Hyaline Casts 7 (*)     All other components within normal limits   NT PROBNP INPATIENT - Abnormal; Notable for the following:     N-Terminal Pro BNP Inpatient 36741 (*)     All other components within normal  limits   BLOOD GAS ARTERIAL - Abnormal; Notable for the following:     pCO2 Arterial 51 (*)     pO2 Arterial 66 (*)     Bicarbonate Arterial 34 (*)     All other components within normal limits   TSH WITH FREE T4 REFLEX - Abnormal; Notable for the following:     TSH 13.00 (*)     All other components within normal limits   ISTAT  GASES LACTATE SONA POCT - Abnormal; Notable for the following:     Ph Venous 7.44 (*)     PCO2 Venous 53 (*)     PO2 Venous 24 (*)     Bicarbonate Venous 36 (*)     All other components within normal limits   GRAM STAIN - Abnormal; Notable for the following:     Gram Stain   (*)     Value: <10 Squamous epithelial cells/low power field  >25 PMNs/low power field  Mixed gram positive and gram negative bacteria present.  Few Yeast      All other components within normal limits   LACTIC ACID WHOLE BLOOD   TROPONIN I   AMMONIA   PROCALCITONIN   LACTIC ACID WHOLE BLOOD   LEGIONELLA PNEUMONIA ANTIGEN URINE   T4 FREE   STREP PNEUMO IGG ABYS 23 SEROTYPES   ISTAT CG4 GASES LACTATE SONA NURSING POCT   STRICT INTAKE AND OUTPUT   VITAL SIGNS   PULSE OXIMETRY NURSING   BLOOD CULTURE   BLOOD CULTURE   INFLUENZA A/B ANTIGEN   RESPIRATORY VIRUS PANEL BY PCR   RESPIRATORY VIRUS PANEL BY PCR   SPUTUM CULTURE AEROBIC BACTERIAL     Imaging Studies:   Recent Results (from the past 24 hour(s))   XR Chest 2 Views    Narrative    Exam: XR CHEST 2 VW, 5/4/2017 9:30 PM    Indication: sob    Comparison: 1/20/2017    Findings:   Cardiac mediastinal silhouette is stably enlarged. Pulmonary  vasculature is indistinct. The trachea slightly deviated to the right.  Perihilar opacities increased from 1/23/2017. Increased retrocardiac  opacities. Elevated left hemidiaphragm with bowel in the left upper  quadrant.       Impression    Impression:   1. Perihilar and retrocardiac opacities, are increased from 1/23/2017,  likely pulmonary edema.  2. Elevated left hemidiaphragm with prominent large bowel loop in the  left upper  quadrant.  3. Cardiomegaly.    I have personally reviewed the examination and initial interpretation  and I agree with the findings.    EDEL AL MD     Recent vital signs /71  Pulse 97  Temp 98.3  F (36.8  C) (Oral)  Resp 21  Ht 1.829 m (6')  Wt 90.7 kg (200 lb)  SpO2 96%  BMI 27.12 kg/m2  Cardiac Rhythm: ,      Abnormal labs/tests/findings requiring intervention:---  Pain control: fair  Nausea control: pt had none  R:   Transfer assistance needed: Total Care  Family present during ED course? No   Family currently present? No  Pt needs tele? Yes  Code Status: Full Code  Tasks needing to be completed: echocardiogram, patient refusing BIBAP- cardiology aware, palliative care & WOC consult for DESTINY Abad  ascom-- [phone] [place] [facility]  [phone] [facility] ED

## 2017-05-05 NOTE — PLAN OF CARE
Problem: Goal Outcome Summary  Goal: Goal Outcome Summary  Outcome: No Change  Nursing Focus: Admission  D: Arrived at 4am from [facility] ED via gurney. Patient accompanied by ED tech.   Admitted for SOB, AMS and cough with phlegm.   Complains of SOB and is on oxygen, neb X1 given at 6am.        I: Admission order not done because patient is lethargic but care plan and education started.  Patient oriented to room, environment, call light. MD. notified of patients arrival on unit.      A: Patient is A&OX4 , AVSS.  Patient denies pain and nausea.    Up with assist of one.  I&O: no impairment.     P: Implement plan of care when available. Continue to monitor patient. Nursing interventions as appropriate.  Keep patient NPO for Echo and is scheduled for palliative care.

## 2017-05-05 NOTE — ED NOTES
Bed: ED15  Expected date: 5/4/17  Expected time: 7:48 PM  Means of arrival: Ambulance  Comments:  [id number]  55 Male  Septic   yellow

## 2017-05-05 NOTE — ED PROVIDER NOTES
History     Chief Complaint   Patient presents with     Shortness of Breath     Altered Mental Status     Cough     The history is provided by the patient. The history is limited by the condition of the patient (altered mental status).     Scott Hebert is a 66 year old male with a history of chronic ischemic heart disease, CHF, cerebral aneurysm, PVD, atrial fibrillation, hyperlipidemia, type II diabetes, hypertension and VANDA who presents from [facility] via EMS for evaluation of altered mental status and shortness of breath. History is limited by the patient's altered mental status. Patient complains of a productive cough with associated chest pain when coughing and worsening shortness of breath since Saturday, five days ago. Patient is typically on 2L of oxygen at baseline, but here in the emergency department has required 6L due to low SpO2. He also complains of increasing bilateral lower extremity edema as well as weight gain, though is not able to state exactly how much weight he has gained. Patient also complains of nausea, but denies vomiting. No fever. No increased urinary frequency, rather reports he has had decreased urinary output. He is not anticoagulated on any medications except daily aspirin. Of note, he was seen in clinic on 5/2/17 and had multiple medication changes including discontinuing Imdur and increasing torsemide, potassium and adding lisinopril.  He has had 2 hospitalizations for CHF exacerbations in April and February and has been doing poorly with cardiac rehab and not participating in rehab services.  His primary care provider, TCU and family have started a discussion with the patient regarding palliative care given his progressive disease however this has not yet been initiated.     I have reviewed the Medications, Allergies, Past Medical and Surgical History, and Social History in the [facility] system.  Past Medical History:   Diagnosis Date     Abdominal aneurysm  without mention of rupture 10/12/2005    repaired 2000     Atrial fibrillation (H)     ablation 1-     Brain aneurysm 12/2/2010    S/p clipping of 3 aneurysm 8/10       Cerebral aneurysm 7/20/2010     CHF exacerbation (H)      Chronic pain 10/12/2005    has chronic pain right foot compartment syndrome requiring narcotic  Methadone 10 mg (75/month) Pain contract signed with marciano Castrejon      CKD (chronic kidney disease) stage 3, GFR 30-59 ml/min 6/15/2011     Community acquired bacterial pneumonia 11/7/2011     COPD (chronic obstructive pulmonary disease) (H) 5/31/2011     Cor athrscl-uns vessel     3/06: NSTEMI in setting of paroxysmal a-fib with RVR.  Stent to RCA.  Echo with EF 45%. Angio on 7/2/09 showed widely patent stent with moderate focal eccentric stenosis at distal stent edge.. Mid rca 60%, proximal rca 50% 1 st om 20% ,  Mid lad 40% --Intracronary measeuement of mid rca showed FFR of . 94      Diabetes mellitus (H)      MRSA     SITE - ARM WOUND 2/27/04     Obese      VANDA-Severe (AHI 67) 2/16/2009    Sleep study  2/12/09 to evaluate poor quality of sleep, unrefreshed sleep, abnormal nocturnal oximetry. SCK898.5 minutes,  latency prolonged 28.3 minutes. Sleep efficiency reduced 81.2%.  Architecture disrupted with sleep stage changes and arousals. Snoring: very loud. RDI 72.4 and AHI 66.9. REM RDI 80.0.  LO2 73.0%. CPAP Titration:improvement; but not elimination of apneas, hypopneas, desaturatio     OTHER LUNG DISEASE NEC     3/25/06: CT chest showing right hilar and mediastinal adenopathy-stable 7/09     Other specified acquired hypothyroidism     s/p GARRETT     Other specified forms of chronic ischemic heart disease     3/06: EF 45% at time of stent in RCA     Pain in limb     Chronic Pain     Pneumonia 2/24/2009     PVCs (premature ventricular contractions) 12/2/2010    S/p ablation of the PVC's 11/10   (Problem list name updated by automated process. Provider to review and confirm.)      Thyrotoxicosis without mention of goiter or other cause, without mention of thyrotoxic crisis or storm     s/p GARRETT     Type 2 diabetes, HbA1C goal < 8% (H) 3/2/2011     Unspecified anemia 4/24/2008    No etiology found 2006.  Had hematuria-  Urology eval with cysto- unremarkable     Unspecified Peripheral Vascular Disease 9/6/2007    S/p Right femoral above-knee popliteal bypass utilizing in situ right greater saphenous vein 11/07        Past Surgical History:   Procedure Laterality Date     AMPUTATE TOE(S)  8/6/2014    Procedure: AMPUTATE TOE(S);  Surgeon: Yossi Graham DPM;  Location:  OR     AMPUTATE TOE(S) Right 1/13/2015    Procedure: AMPUTATE TOE(S);  Surgeon: Madhav Alarcon DPM;  Location: [facility] OR     C ABLATION SUPRAVENT ARRHYTHMOGENIC FOCUS  6/2010      C BYPASS GRAFT OTHR,AORTOFEMORAL  2000    Aortofemoral Bypass Graft     COLONOSCOPY  2/16/2011    COLONOSCOPY performed by VALERIA SWEET at [facility]     ESOPHAGOSCOPY N/A 7/29/2016    Procedure: ESOPHAGOSCOPY;  Surgeon: Fantasma Gallegos MD;  Location:  OR     ESOPHAGOSCOPY, GASTROSCOPY, DUODENOSCOPY (EGD), COMBINED  2/16/2011    COMBINED ESOPHAGOSCOPY, GASTROSCOPY, DUODENOSCOPY (EGD) performed by VALERIA SWEET at [facility]     ESOPHAGOSCOPY, GASTROSCOPY, DUODENOSCOPY (EGD), COMBINED N/A 11/3/2016    Procedure: COMBINED ESOPHAGOSCOPY, GASTROSCOPY, DUODENOSCOPY (EGD);  Surgeon: Imer Layne MD;  Location:  GI     ESOPHAGOSCOPY, GASTROSCOPY, DUODENOSCOPY (EGD), COMBINED N/A 12/23/2016    Procedure: COMBINED ESOPHAGOSCOPY, GASTROSCOPY, DUODENOSCOPY (EGD), BIOPSY SINGLE OR MULTIPLE;  Surgeon: Kayla Sepulveda MD;  Location:  GI     HERNIA REPAIR, INCISIONAL  3-6-09     INCISION AND DRAINAGE FINGER, COMBINED Left 12/12/2014    Procedure: COMBINED INCISION AND DRAINAGE FINGER;  Surgeon: Wang Solis MD;  Location: [facility] OR     LARYNGOSCOPY WITH BIOPSY(IES) N/A 7/29/2016    Procedure: LARYNGOSCOPY WITH  BIOPSY(IES);  Surgeon: Fantasma Gallegos MD;  Location:  OR     PERCUTANEOUS MITRAL VALVE REPAIR N/A 10/11/2016    Procedure: PERCUTANEOUS MITRAL VALVE REPAIR ANESTHESIA;  Surgeon: Rosana Balderrama MD;  Location: [facility] OR     SURGICAL HISTORY OF -   11/07    Right femoral above-knee popliteal bypass utilizing in situ right greater saphenous vein.       Family History   Problem Relation Age of Onset     HEART DISEASE Mother      Respiratory Father        Social History   Substance Use Topics     Smoking status: Former Smoker     Packs/day: 0.25     Years: 20.00     Types: Cigarettes     Quit date: 3/6/2009     Smokeless tobacco: Never Used     Alcohol use No     No current facility-administered medications for this encounter.      Current Outpatient Prescriptions   Medication     torsemide (DEMADEX) 20 MG tablet     potassium chloride SA (K-DUR/KLOR-CON M) 20 MEQ CR tablet     lisinopril (PRINIVIL/ZESTRIL) 2.5 MG tablet     metoprolol (TOPROL-XL) 25 MG 24 hr tablet     OXYCODONE HCL PO     ipratropium - albuterol 0.5 mg/2.5 mg/3 mL (DUONEB) 0.5-2.5 (3) MG/3ML neb solution     morphine 0.1% in solosite topical gel     Melatonin 10 MG TABS tablet     acetaminophen (TYLENOL) 500 MG tablet     aspirin 81 MG chewable tablet     magnesium oxide (MAG-OX) 400 MG tablet     amiodarone (PACERONE/CODARONE) 200 MG tablet     albuterol (PROAIR HFA/PROVENTIL HFA/VENTOLIN HFA) 108 (90 BASE) MCG/ACT Inhaler     mometasone-formoterol (DULERA) 200-5 MCG/ACT oral inhaler     tiotropium (SPIRIVA) 18 MCG capsule     gabapentin (NEURONTIN) 100 MG capsule     atorvastatin (LIPITOR) 20 MG tablet     guaiFENesin (MUCINEX) 600 MG 12 hr tablet     sodium chloride (OCEAN) 0.65 % nasal spray     lidocaine (LIDODERM) 5 % Patch     allopurinol (ZYLOPRIM) 300 MG tablet     ferrous gluconate (FERGON) 324 (38 FE) MG tablet     bisacodyl (DULCOLAX) 5 MG EC tablet     bisacodyl (DULCOLAX) 10 MG Suppository     polyethylene glycol (MIRALAX) powder  "    senna-docusate (SENOKOT-S;PERICOLACE) 8.6-50 MG per tablet     calcium carb 1250 mg, 500 mg [place],/vitamin D 200 units (OSCAL WITH D) 500-200 MG-UNIT per tablet     multivitamin, therapeutic with minerals (MULTI-VITAMIN) TABS tablet     levothyroxine (SYNTHROID/LEVOTHROID) 175 MCG tablet     pantoprazole (PROTONIX) 40 MG EC tablet     order for DME     order for DME     order for DME     order for DME     order for DME     ORDER FOR DME     blood glucose monitoring (ACCU-CHEK MULTICLIX) lancets     ORDER FOR DME     ORDER FOR DME     Respiratory Therapy Supplies (NEBULIZER/TUBING/MOUTHPIECE) KIT     ORDER FOR DME        Allergies   Allergen Reactions     Transfusion (Informational Only) [Blood Transfusion Related (Informational Only)] Other (See Comments)     Patient has a history of a clinically significant antibody against RBC antigens.  A delay in compatible RBCs may occur.     Erythromycin Other (See Comments)     \"decreases the half life of my methadone\"       Review of Systems   Constitutional: Positive for unexpected weight change (weight gain). Negative for fever.   Respiratory: Positive for cough (productive) and shortness of breath.    Cardiovascular: Positive for leg swelling (bilateral).   Gastrointestinal: Positive for nausea. Negative for vomiting.   All other systems reviewed and are negative.      Physical Exam   BP: (!) 124/96  Pulse: 97  Temp: 98.3  F (36.8  C)  Resp: 20  Height: 182.9 cm (6')  Weight: 90.7 kg (200 lb)  SpO2: 96 %  Physical Exam  General: patient is alert but appears very fatigued and somewhat confused, slow to answer questions   Head: atraumatic and normocephalic   EENT: moist mucus membranes, pupils round and reactive, sclerae anicteric  Neck: supple   Cardiovascular: regular rate and rhythm, extremities warm and well perfused, 1+ pitting bilateral lower extremity edema to the knees  Pulmonary: Symmetric without wheezing, bilateral crackles, no rales  Abdomen: soft, " non-tender, non-distended  Musculoskeletal: normal range of motion   Neurological: alert and oriented to person, place and time, but slow to respond to questions, moving all extremities symmetrically, bilateral lower extremity weakness at baseline , sensation to light touch in the extremities is intact   Skin: warm, dry, Ulceration of the left heel without surrounding erythema or purulence, wounds noted on the right lateral lower leg and left lateral lower leg also without any surrounding erythema, warmth or purulent discharge, small abrasion to the right knee, small skin tear on the left forearm    ED Course     ED Course     Procedures       8:12 PM  The patient was seen and examined by Dr. Cruz in Room 15.          EKG Interpretation:      Interpreted by Roselia Cruz  Time reviewed: 2040  Symptoms at time of EKG: shortness of breath    Rhythm: normal sinus   Rate: Normal  Axis: Normal  Ectopy: none  Conduction: left bundle branch block (complete)  ST Segments/ T Waves: No acute ischemic changes  Q Waves: nonspecific  Comparison to prior: Unchanged    Clinical Impression: left bundle branch block                Critical Care time:  none               Labs Ordered and Resulted from Time of ED Arrival Up to the Time of Departure from the ED - No data to display         Assessments & Plan (with Medical Decision Making)   Mr. Hebert is a 66 year old male with a history of chronic ischemic heart disease, CHF, cerebral aneurysm, PVD, atrial fibrillation, hyperlipidemia, type II diabetes, hypertension and VANDA who presents from [facility] via EMS for evaluation of altered mental status and shortness of breath.  He has had increasing shortness of breath, lower extremity edema and weight gain over the last few days and is most consistent with a CHF exacerbation.  Chest x-rays obtained which does show evolving pulmonary edema.  ECG does not show any acute ischemic changes and troponin is 0.029.  He  is afebrile here and has no leukocytosis or elevation in lactate.  Chest x-ray does not show any focal findings to suggest pneumonia.  ABG was obtained which does show slightly elevated PCO2 to 53 with a pH of 7.44 and bicarb of 36.   His creatinine is at baseline at 1.45.  Hgb is stable at 8.0.  History and exam are less consistent with PE. He was given 40 mg IV Lasix in the emergency department.  He is still requiring supplemental oxygen up from his baseline.  He does have a slightly elevated CO2 to 53 and per EMS/nursing home report does have some confusion at baseline.  Ammonia level is pending.  He does not have any focal neurologic findings to warrant further imaging at this time.  Will plan to admit to cardiology for continued diuresis.  Patient would also benefit from palliative care consultation during his stay to further discuss his goals of care.      I have reviewed the nursing notes.    I have reviewed the findings, diagnosis, plan and need for follow up with the patient.    Current Discharge Medication List          Final diagnoses:   Acute on chronic combined systolic and diastolic congestive heart failure (H)   IMckenzie, am serving as a trained medical scribe to document services personally performed by Roselia Cruz MD, based on the provider's statements to me.   Roselia LOGAN MD, was physically present and have reviewed and verified the accuracy of this note documented by Mckenzie Lee.      5/4/2017   [facility], [place], EMERGENCY DEPARTMENT     Roselia Cruz MD  05/04/17 [id number]

## 2017-05-05 NOTE — H&P
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 EF has been as low as far back as 2006 when it was 40-45% and most recently 35-40% on 11/4/16. His phoebe clip devices has been stable and he has mild to moderate mitral insufficiency. His most recent coronary angiogram showed  of the pRCA just distal to his stent. He has multiple heart failure admission since 6 months. He has been residing in nursing home with this.    He has been having ongoing lethargy, with progressive weight gain (goal weight of 180lbs and reached to 206 in TCU), for past few weeks.  He did have ongoing cough for past few days with brown colored phlegm and found to be more hypoxic needing 6l oxygen than before.    Due to above reasons he was brought to the hospital.    At the time of encounter , pt was not able to give me much information but was easily arousable and was verbalizing in between and easily drowsy  He did mention he has ongoing difficulty in breathing and cough, along with leg swelling which has been ongoing as well.  He denies any chest pain, fever, chills, headache, blurring of vision, neurological symptoms, back pain, abdominal pain, diarrhea or urinary symptoms.    In ED pt was occasionally coughing with drowsiness, and hypoxic;   Vitals: T-98, BP to 121/73, RR- 24, Spo2- 96%, HR to 94 in 6lPM        Past Medical History:   Patient Active Problem List   Diagnosis     Backache     Abdominal aortic aneurysm (H)     MRSA     Coronary  atherosclerosis     Chronic ischemic heart disease     Acquired hypothyroidism     Peripheral vascular disease (HCC)     Anemia     Atrial fibrillation (H)     GERD (gastroesophageal reflux disease)     Obesity     VANDA-Severe (AHI 67)     Cerebral aneurysm     Hyperlipidemia LDL goal <100     PVCs (premature ventricular contractions)     Brain aneurysm     Allergic rhinitis     Eczema     Advance Care Planning     Diaphragmatic paralysis     Type 2 diabetes, HbA1C goal < 8% (H)     COPD (chronic obstructive pulmonary disease) (H)     Hypogonadism, male     Gout, chronic     Health Care Home     Steroid-induced hyperglycemia     Osteomyelitis (H)     Cellulitis     Cellulitis of hand     Dog bite     CHF (congestive heart failure) (H)     Acute on chronic systolic (congestive) heart failure (H)     Shortness of breath     Pulmonary hypertension (H)     Hypotension     Epigastric pain     Dehydration     Narcotic dependency, continuous (H)     Sedated due to medication     Chest pain     CHF exacerbation (H)     Chronic kidney disease, stage 4 (severe) (H)     Hypokalemia     Non-rheumatic mitral regurgitation     Mitral regurgitation     Paroxysmal atrial fibrillation (H)       Past Surgical History:   Past Surgical History:   Procedure Laterality Date     AMPUTATE TOE(S)  8/6/2014    Procedure: AMPUTATE TOE(S);  Surgeon: Yossi Graham DPM;  Location:  OR     AMPUTATE TOE(S) Right 1/13/2015    Procedure: AMPUTATE TOE(S);  Surgeon: Madhav Alarcon DPM;  Location: [facility] ABLATION SUPRAVENT ARRHYTHMOGENIC FOCUS  6/2010      C BYPASS GRAFT OTHR,AORTOFEMORAL  2000    Aortofemoral Bypass Graft     COLONOSCOPY  2/16/2011    COLONOSCOPY performed by VALERIA SWEET at [facility]     ESOPHAGOSCOPY N/A 7/29/2016    Procedure: ESOPHAGOSCOPY;  Surgeon: Fantasma Gallegos MD;  Location:  OR     ESOPHAGOSCOPY, GASTROSCOPY, DUODENOSCOPY (EGD), COMBINED  2/16/2011    COMBINED ESOPHAGOSCOPY, GASTROSCOPY,  DUODENOSCOPY (EGD) performed by VALERIA SWEET at [facility]     ESOPHAGOSCOPY, GASTROSCOPY, DUODENOSCOPY (EGD), COMBINED N/A 11/3/2016    Procedure: COMBINED ESOPHAGOSCOPY, GASTROSCOPY, DUODENOSCOPY (EGD);  Surgeon: Imer Layne MD;  Location:  GI     ESOPHAGOSCOPY, GASTROSCOPY, DUODENOSCOPY (EGD), COMBINED N/A 12/23/2016    Procedure: COMBINED ESOPHAGOSCOPY, GASTROSCOPY, DUODENOSCOPY (EGD), BIOPSY SINGLE OR MULTIPLE;  Surgeon: Kayla Sepulveda MD;  Location:  GI     HERNIA REPAIR, INCISIONAL  3-6-09     INCISION AND DRAINAGE FINGER, COMBINED Left 12/12/2014    Procedure: COMBINED INCISION AND DRAINAGE FINGER;  Surgeon: Wang Solis MD;  Location: [facility]     LARYNGOSCOPY WITH BIOPSY(IES) N/A 7/29/2016    Procedure: LARYNGOSCOPY WITH BIOPSY(IES);  Surgeon: Fantasma Gallegos MD;  Location:  OR     PERCUTANEOUS MITRAL VALVE REPAIR N/A 10/11/2016    Procedure: PERCUTANEOUS MITRAL VALVE REPAIR ANESTHESIA;  Surgeon: Rosana Balderrama MD;  Location:  OR     SURGICAL HISTORY OF -   11/07    Right femoral above-knee popliteal bypass utilizing in situ right greater saphenous vein.       Medications (outpatient):    No current facility-administered medications on file prior to encounter.   Current Outpatient Prescriptions on File Prior to Encounter:  torsemide (DEMADEX) 20 MG tablet Take 3 tablets (60 mg) by mouth 2 times daily Do not hold for systolic blood pressure, only hold for mean arterial pressure (MAP) < 65. MAP = [(2x diastolic) + systolic] / 3   potassium chloride SA (K-DUR/KLOR-CON M) 20 MEQ CR tablet Take 1 tablet (20 mEq) by mouth 2 times daily   lisinopril (PRINIVIL/ZESTRIL) 2.5 MG tablet Take 1 tablet (2.5 mg) by mouth daily   metoprolol (TOPROL-XL) 25 MG 24 hr tablet Take 0.5 tablets (12.5 mg) by mouth daily Do not hold for systolic blood pressure, only hold for mean arterial pressure (MAP) < 65 or HR <60. MAP = [(2x diastolic) + systolic] / 3   OXYCODONE HCL PO  2.5 mg 2 times daily as needed   ipratropium - albuterol 0.5 mg/2.5 mg/3 mL (DUONEB) 0.5-2.5 (3) MG/3ML neb solution Take 1 vial (3 mLs) by nebulization every 6 hours as needed for shortness of breath / dyspnea or other (increasing secretions)   morphine 0.1% in solosite topical gel Place onto the skin 4 times daily as needed Apply 1 gm topically to LLE for pain four times daily as needed   Melatonin 10 MG TABS tablet Take 1 tablet (10 mg) by mouth At Bedtime   acetaminophen (TYLENOL) 500 MG tablet Take 2 tablets (1,000 mg) by mouth every 6 hours as needed for mild pain or fever   aspirin 81 MG chewable tablet Take 1 tablet (81 mg) by mouth daily   magnesium oxide (MAG-OX) 400 MG tablet Take 1 tablet (400 mg) by mouth daily   amiodarone (PACERONE/CODARONE) 200 MG tablet Take 1 tablet (200 mg) by mouth daily   albuterol (PROAIR HFA/PROVENTIL HFA/VENTOLIN HFA) 108 (90 BASE) MCG/ACT Inhaler Inhale 2 puffs into the lungs every 6 hours as needed for shortness of breath / dyspnea or wheezing   mometasone-formoterol (DULERA) 200-5 MCG/ACT oral inhaler Inhale 2 puffs into the lungs 2 times daily   tiotropium (SPIRIVA) 18 MCG capsule Inhale 1 capsule (18 mcg) into the lungs daily   gabapentin (NEURONTIN) 100 MG capsule Take 2 capsules (200 mg) by mouth At Bedtime   atorvastatin (LIPITOR) 20 MG tablet Take 1 tablet (20 mg) by mouth daily   guaiFENesin (MUCINEX) 600 MG 12 hr tablet Take 1 tablet (600 mg) by mouth 2 times daily   sodium chloride (OCEAN) 0.65 % nasal spray Spray 2 sprays into both nostrils every hour as needed for congestion   lidocaine (LIDODERM) 5 % Patch Place 1 patch onto the skin every 24 hours To chest for for chest pain.   allopurinol (ZYLOPRIM) 300 MG tablet Take 1 tablet (300 mg) by mouth daily   ferrous gluconate (FERGON) 324 (38 FE) MG tablet Take 1 tablet (324 mg) by mouth 2 times daily   bisacodyl (DULCOLAX) 5 MG EC tablet Take 1 tablet (5 mg) by mouth daily   bisacodyl (DULCOLAX) 10 MG Suppository  "Place 1 suppository (10 mg) rectally daily as needed for constipation   polyethylene glycol (MIRALAX) powder Take 17 g by mouth 2 times daily   senna-docusate (SENOKOT-S;PERICOLACE) 8.6-50 MG per tablet Take 2 tablets by mouth 2 times daily Hold for diarrhea.   calcium carb 1250 mg, 500 mg [place],/vitamin D 200 units (OSCAL WITH D) 500-200 MG-UNIT per tablet Take 1 tablet by mouth 2 times daily (with meals)   multivitamin, therapeutic with minerals (MULTI-VITAMIN) TABS tablet Take 1 tablet by mouth daily   levothyroxine (SYNTHROID/LEVOTHROID) 175 MCG tablet Take 1 tablet (175 mcg) by mouth every morning (before breakfast)   pantoprazole (PROTONIX) 40 MG EC tablet Take 1 tablet (40 mg) by mouth every morning   order for DME Equipment being ordered: Oxygen 2-3L/min   order for DME Equipment being ordered: Xerofoam gauze 6x8 sheets.  For a total of 5 large wounds, daily change   order for DME Equipment being ordered: 4x4 gauze, sterile gauze   order for DME Equipment being ordered: wound cleanser   order for DME Equipment being ordered: OxygenUse 2-3 L via NC   ORDER FOR DME Equipment being ordered: surgical shoe   blood glucose monitoring (ACCU-CHEK MULTICLIX) lancets Use to test blood sugar 4 times daily or as directed.   ORDER FOR DME BIPAP 20/11   ORDER FOR DME Blood pressure monitor device.   Respiratory Therapy Supplies (NEBULIZER/TUBING/MOUTHPIECE) KIT 1 kit as needed.   ORDER FOR DME Oxygen 2 lits/min continuous. From [facility]       Allergy:  Allergies   Allergen Reactions     Transfusion (Informational Only) [Blood Transfusion Related (Informational Only)] Other (See Comments)     Patient has a history of a clinically significant antibody against RBC antigens.  A delay in compatible RBCs may occur.     Erythromycin Other (See Comments)     \"decreases the half life of my methadone\"       Social History:   Social History     Social History     Marital status: Single     Spouse name: N/A     Number of children: N/A "     Years of education: N/A     Occupational History     Not on file.     Social History Main Topics     Smoking status: Former Smoker     Packs/day: 0.25     Years: 20.00     Types: Cigarettes     Quit date: 3/6/2009     Smokeless tobacco: Never Used     Alcohol use No     Drug use: No     Sexual activity: Yes     Partners: Female     Birth control/ protection: Surgical     Other Topics Concern     Parent/Sibling W/ Cabg, Mi Or Angioplasty Before 65f 55m? No     Social History Narrative       Family History:  Family History   Problem Relation Age of Onset     HEART DISEASE Mother      Respiratory Father        ROS:   Review Of Systems  ROS: 10 point ROS neg other than the symptoms noted above in the HPI.      Objective:  Physical exam:  /67  Pulse 97  Temp 98.3  F (36.8  C) (Oral)  Resp 21  Ht 1.829 m (6')  Wt 90.7 kg (200 lb)  SpO2 95%  BMI 27.12 kg/m2  Wt Readings from Last 2 Encounters:   05/04/17 90.7 kg (200 lb)   05/02/17 99.1 kg (218 lb 8 oz)       Intake/Output Summary (Last 24 hours) at 05/05/17 0036  Last data filed at 05/05/17 0033   Gross per 24 hour   Intake                0 ml   Output              500 ml   Net             -500 ml       Physical Exam:   General: Pt drowsy on exam, easily arousable  HEENT: No Scleral icterus. NCAT, MMM. PERRL, EOMI.  Cardiac: Normal rate, regular rhythm. No m/r/g. Normal S1, S2, JVD elevated   Pulm: CTAB, increased resp effort and bilateral faint crackles  Abd: distended, firm and subcutaneous edema on exam, bowel sounds noted, no hepataspleenomegaly  Skin: No jaundice, presence of venous stasis ulcer on lower extremity  Extremities:non pitting edema  Joints: No inflammation noted on joints  Neuro: A&Ox2, no focal deficits, drowsy but easily arousable    Labs (Past three days):  CBC  Recent Labs  Lab 05/04/17 2038   WBC 5.6   RBC 2.54*   HGB 8.1*   HCT 25.5*      MCH 31.9   MCHC 31.8   RDW 17.2*   *       BMP  Recent Labs  Lab 05/04/17 2038    *   POTASSIUM 3.8   CHLORIDE 89*   CO2 33*   ANIONGAP 7   *   BUN 36*   CR 1.45*   GFRESTIMATED 49*   GFRESTBLACK 59*   АНДРЕЙ 9.0        INR  Recent Labs  Lab 05/04/17 2038   INR 1.23*       Liver panel  Recent Labs  Lab 05/04/17 2038   PROTTOTAL 7.6   ALBUMIN 2.5*   BILITOTAL 1.2   ALKPHOS 370*   AST 30   ALT 14       Urinalysis  Recent Labs   Lab Test  05/04/17   2149   03/19/15   1553   COLOR  Yellow   < >  Yellow   APPEARANCE  Clear   < >  Clear   URINEGLC  Negative   < >  Negative   URINEBILI  Negative   < >  Negative   URINEKETONE  Negative   < >  Negative   SG  1.008   < >  1.015   UBLD  Small*   < >  Trace*   URINEPH  6.0   < >  7.0   PROTEIN  10*   < >  Negative   UROBILINOGEN   --    --   0.2   NITRITE  Negative   < >  Negative   LEUKEST  Negative   < >  Negative   RBCU  13*   < >  O - 2   WBCU  <1   < >  O - 2    < > = values in this interval not displayed.       Cultures  Blood: pending    Imaging/procedure results:  # CXR:  Impression:   1. Perihilar and retrocardiac opacities, are increased from 1/23/2017,  likely pulmonary edema.  2. Elevated left hemidiaphragm with prominent large bowel loop in the  left upper quadrant.   3. Cardiomegaly.      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 H/o multiple admissions with heart failure exacerbations.  #) Acute on chronic systolic heart failure  #) HFrEF  #)MVR s/p mitral clip placement   -strict I and O, daily weight (goal weight is 180lbs)  -K>4, Mag>2  -will give one time dose of lasix 80mg IV once and see response; total aim of keeping net negative 1.5 to 2l in next 24  hrs  -continue with his chronic meds- including lisinopril 2.5mg, metoprolol 12.5mg Xl , also amiodarone 200 mg,     Cause; potential infections given his cough could be PNA, but xray more consistent with pulm edema; there is no fever or white count;   Will send for blood c/s, sputum culture, RVP and procalcitonin; influenza was negative  There is concern about compliance with his meds as well  Will hold off on ab for now  Will also send echo for this admit just to make sure there is no progression  No chest pain, EKG changes and troponin negative     #) Altered mental status:  -pt has been on opiate meds -including morphine, oxycodone and also previously on methadone  -he is off methadone for now  -also ongoing hypercapnea due to VANDA and he is not on CPAP  -will further work this up with ABG, head CT, TSH  -also lactate sent     - pt was told to use BiPAP over night given his hypercapnea, but he refused  - will avoid sedating meds and work up as above   -will also introduce lactulose BID    #) Goals of care;  -has discussion with patient in bedside today and clearly stated that he wants to focus on being comfortable; also confirmed this with his POA, sister Kallie who is a physician and she also was agreeable with this  -doesnot want resuscitation in event of cardiac or resp arrest- will keep him DNR/DNI and consult palliative for tm    #)Hypothyroidism:  Continue with levothyroxine    #) HTN:  Continue with lisinopril 2.5 mg  And metoprolol 12.5 mg Xl    #)Venous stasis ulcer:  -US doppler lower extremity to r/o DVT and wound care consult    #)Physical deconditioning:  -PT/OT      FEN: NPO given altered mental status  Prophylaxis:  DVT: enoxaparin    Disposition: pending further evaluation by primary team  Code Status: FULL CODE    Miguelangel HOSKINS  PGY 3 Internal Medicine  P;[phone]    Case will be staffed in am by the primary team      I have seen, interviewed, and examined patient. I have reviewed the  laboratory tests, imaging, and other investigations. I have reviewed the management plan with the patient. I discussed with the team and agree with the findings and plan in this resident/fellow/nurse practitioner's note. In addition, changes in the physical examination, assessment and plan have been incorporated into the note by myself, as to make it a single cohesive document.       Katina Berman MD, MS  Cardiology/Cardiac EP Attending Staff

## 2017-05-05 NOTE — PROGRESS NOTES
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 H/o multiple admissions with heart failure exacerbations.    #) Acute on chronic systolic heart failure  #) HFrEF  #)MVR s/p mitral clip placement   Considered infectious vs cardiac etiology, CXR appeared to be pulmonary edema. No WBC or fever, no troponin elevation or EKG changes. PRocal 0.58, influenza negative, RVP pending.  - Holding abx for now  - TTE today  - S/p 8-0 mg lasix overnight, will repeat dose today for goal net negative 1.5-2L   - Continue home medications: lisinopril 2.5mg, metoprolol 12.5mg Xl , amiodarone 200 mg  - Strict I and O, daily weight (goal weight is 180lbs)  - K>4, Mag>2      #) Altered mental status:  Previously on opiate meds -including morphine, oxycodone and also previously on methadone, recently discontinued. Some, likely chronic hypercapnea from VANDA and refusal to wear CPAP/BiPAP. Negative CT Head, TSH as below.  - Encourage CPAP  - Avoid sedating meds and work up as above      #) Goals of care  End Stage heart failure. Ongoing discussions in past about taking more of a palliative approach. Recently discussed between outpatient Cardiology, patient's nursing home, and patient's sister.   - Confirmed with team overnight and this morning that wants to focus on being comfortable; DNR/DNI status, also confirmed this with his POA, sister Kallie who is a physician  - Palliative consult today     #)Hypothyroidism:  TSH elevated on admission at 13 with T$ 1.33 (Jan 2017 TSH 12.94  "with T4 1.18). Reportedly with recent adjustment of levothyroxine in setting of ongoing lethergy, unclear if actually occurred.   - Continue with levothyroxine 175 mcg daily, consider increase     #) HTN:  Continue with lisinopril 2.5 mg, metoprolol 12.5 mg XL     #)Venous stasis ulcer:  - DVT US negative  - Wound Consult today     #)Physical deconditioning  -PT/OT     FEN: NPO given altered mental status  Prophylaxis: DVT: enoxaparin   Disposition: pending further evaluation by primary team  Code Status: FULL CODE    Patient seen and discussed with Dr. Berman, who agrees with above plan.    Tania Falcon MD PhD  Internal Medicine PGY-2  [phone]      I have seen, interviewed, and examined patient. I have reviewed the laboratory tests, imaging, and other investigations. I have reviewed the management plan with the patient. I discussed with the team and agree with the findings and plan in this resident/fellow/nurse practitioner's note. In addition, changes in the physical examination, assessment and plan have been incorporated into the note by myself, as to make it a single cohesive document.       Katina Berman MD, MS  Cardiology/Cardiac EP Attending Staff            Interval History:   No acute events overnight. This morning reports shortness of breath as most significant symptom. Confirmed that wanted code status to be DNR/DNI. When asked about important things for him in life stated \"Brannon\" and \"family.\" Discussed plan for meeting with Palliative Care. Later had leg dressings changed by Wound Care with significant improvement in legs from prior.         Medications:   Reviewed. Please see EPIC for details.           Physical Exam:   Vitals were reviewed  Blood pressure 119/67, pulse 97, temperature 98.9  F (37.2  C), temperature source Oral, resp. rate 16, height 1.829 m (6'), weight 94.2 kg (207 lb 10.8 oz), SpO2 90 %.    I&O's:   Intake/Output Summary (Last 24 hours) at 05/05/17 1512  Last data filed at " 05/05/17 1058   Gross per 24 hour   Intake              720 ml   Output             1775 ml   Net            -1055 ml     Physical Exam:   General: Alert, lying in bed, NAD  HEENT: No Scleral icterus. NCAT, MMM. PERRL, EOMI  Cardiac: Normal rate, regular rhythm. No m/r/g. Normal S1, S2, JVD elevated   Pulm: CTAB, increased resp effort and bilateral faint crackles in bases  Abd: distended, firm and subcutaneous edema on exam, bowel sounds noted, no hepataspleenomegaly  Skin: No jaundice, presence of venous stasis ulcer on lower extremity, most significant left heel  Extremities: 1+ eddema  Joints: No inflammation noted on joints  Neuro: A&Ox2, speech intact, moving all extremities         Data:   ROUTINE LABS (Last four results)  CMP  Recent Labs  Lab 05/05/17 0557 05/04/17 2038    130*   POTASSIUM 3.7 3.8   CHLORIDE 91* 89*   CO2 32 33*   ANIONGAP 9 7   * 106*   BUN 35* 36*   CR 1.36* 1.45*   GFRESTIMATED 53* 49*   GFRESTBLACK 64 59*   АНДРЕЙ 9.0 9.0   PROTTOTAL 7.5 7.6   ALBUMIN 2.5* 2.5*   BILITOTAL 0.7 1.2   ALKPHOS 347* 370*   AST 30 30   ALT 14 14     CBC  Recent Labs  Lab 05/05/17 0557 05/04/17 2038   WBC 5.6 5.6   RBC 2.62* 2.54*   HGB 8.1* 8.1*   HCT 26.3* 25.5*    100   MCH 30.9 31.9   MCHC 30.8* 31.8   RDW 17.2* 17.2*   * 126*     INR  Recent Labs  Lab 05/04/17 2038   INR 1.23*     Arterial Blood Gas  Recent Labs  Lab 05/04/17  2359   PH 7.44   PCO2 51*   PO2 66*   HCO3 34*   O2PER 4

## 2017-05-05 NOTE — PHARMACY-VANCOMYCIN DOSING SERVICE
Pharmacy Vancomycin Initial Note  Date of Service May 5, 2017  Patient's  1950  66 year old, male    Indication: [facility] Pneumonia    Current estimated CrCl = Estimated Creatinine Clearance: 61.3 mL/min (based on Cr of 1.45).    Creatinine for last 3 days  2017:  8:38 PM Creatinine 1.45 mg/dL    Recent Vancomycin Level(s) for last 3 days  No results found for requested labs within last 72 hours.      Vancomycin IV Administrations (past 72 hours)      No vancomycin orders with administrations in past 72 hours.                Nephrotoxins and other renal medications (Future)    Start     Dose/Rate Route Frequency Ordered Stop    17 0800  lisinopril (PRINIVIL/Zestril) tablet 2.5 mg      2.5 mg Oral DAILY 17 0411      17 0500  piperacillin-tazobactam (ZOSYN) 4.5 g vial to attach to  mL bag      4.5 g  over 1 Hours Intravenous EVERY 6 HOURS 17 0432      17 0500  vancomycin (VANCOCIN) 1,750 mg in NaCl 0.9 % 500 mL intermittent infusion      1,750 mg Intravenous EVERY 12 HOURS 17 0441            Contrast Orders - past 72 hours     None                Plan:  1.  Start vancomycin  1750 mg IV q12h.   2.  Goal Trough Level: 15-20 mg/L   3.  Pharmacy will check trough levels as appropriate in 1-3 Days.    4. Serum creatinine levels will be ordered daily for the first week of therapy and at least twice weekly for subsequent weeks.    5. Mobile method utilized to dose vancomycin therapy: Method 1    Jorge Alberto Olmedo

## 2017-05-05 NOTE — ED NOTES
Triage Assessment & Note:    BP (!) 124/96  Pulse 97  Temp 98.3  F (36.8  C) (Oral)  Resp 20  Ht 1.829 m (6')  Wt 90.7 kg (200 lb)  SpO2 96%  BMI 27.12 kg/m2      Patient presents with: Pt with hx of CHF and liver BIBA from care facility for cough, altered mental status, SOB, and general edema. Pt usually on 2L of O2 and now on 6L.  No reports of fever.      Home Treatments/Remedies: Home medication    Febrile / Afebrile: afebrile    Duration of C/o: 6 days  Sharron Pérez, RN  May 4, 2017

## 2017-05-05 NOTE — PROGRESS NOTES
Brief Social Work Note    SW received a call from [facility].  Pt is residing in their TCU and has a current bedhold.  SW to complete formal assessment today as able.    MARIMAR Gates, APSW  [address]   Pager: [phone]  Phone: [phone]

## 2017-05-05 NOTE — PLAN OF CARE
Problem: Cardiac: Heart Failure (Adult)  Goal: Signs and Symptoms of Listed Potential Problems Will be Absent or Manageable (Cardiac: Heart Failure)  Signs and symptoms of listed potential problems will be absent or manageable by discharge/transition of care (reference Cardiac: Heart Failure (Adult) CPG).   Outcome: No Change  D:Complianed of shortness of breath a few times.  Was given albuterol puffs this a.m and neb this afternoon.   Has a frequent, productive cough of rust colored sputum. O2 sat 90% on 5L/NC.  Lungs are coarse with diminished breath sounds in the lower lobes bilaterally. Has generalized edema.   Was seen by WOC, RN and MD from primary team assess lower extremitiy wounds at that time. No complaints of pain.  Temp 97.9, HR 98, Bp 113/57 MAP 83.  Rhythm is Paced.  Turned q2 hrs.       A:Mild shortness of breath.  Relieved with albuterol puffs and nebs.  O2 sats are normal on 5L/NC.   Has a congested sounding, productive cough.  Afebrile and Vital signs are stable.  Condition is stable.     P:Continue to assess level of comfort.  Assess respiratory status.   Monitor O2 sats.  Albuterol nebs prn as ordered.  Monitor temp, rhythm and vital signs.  Reposition q2hrs.  Elevate heel off bed with pillows.

## 2017-05-06 NOTE — PROVIDER NOTIFICATION
D/I/A: MD Falcon notified for O2 sats 86-90% with 5L/min nasal cannula, multiple times this evening.  Pt refusing to wear face mask or CPAP.  Cough noted.  VSSA.  Cooperative and A+Ox4.  Voiding in large amounts; has difficulty with using urinal for accurate output totals.    P: Continue to monitor status.  Offer CPAP at HS.

## 2017-05-06 NOTE — PLAN OF CARE
Problem: Goal Outcome Summary  Goal: Goal Outcome Summary  PT: PT evaluation completed and treatment initiated.  Pt difficult to encourage at times, also very tired/lethargic during parts of session needing reminders for increased alertness, on 5/6L O2 through out.  Pt requires mod-maxA x 2 for bed mobility and modA x 2 for sit<>stand transfers with use of walker for increased stability.  Walker ordered for use in room.  History about what pt was doing for therapy at rehab hard to attain from pt, states he was getting PT several times a week.  Recommend TCU upon d/c in order to increase strength and IND with functional mobility.

## 2017-05-06 NOTE — PLAN OF CARE
Problem: Goal Outcome Summary  Goal: Goal Outcome Summary  Outcome: No Change  D: Admitted 5/4 with SOB, weight gain, cough, hypoxia. Hx mitraclip 10/11/16  I/A: Forgetful. VSS. O2 sats 88-93% on 6 LPM humidified O2 via NC. Denies pain. Endorses SOB. Given one time dose of IV lasix per orders. Adequate UOP. Difficulty voiding at times. Up with Ax2 and gait belt. Up in chair in AM. Repositioned q2h. Resting between cares.   P: Continue to monitor and assess. Notify Cards 1 with concerns.

## 2017-05-06 NOTE — PLAN OF CARE
Problem: Goal Outcome Summary  Goal: Goal Outcome Summary  Outcome: No Change  D/I: Pt started on a bumex drip. FC inserted for trouble urinating. FR 1500 cc/day.Had one lg bloody stool. Mds alerted. Hgb drawn,8.4. Has hemorroids and was dissempacted yesterday. Scrotal edema. L leg wound dressings changed. Up with assist of 2 to chair and bed, stands fairly well but has trouble walking.  Assessed by OT/PT. Frequent productive cough, sats high 80s/low 90s on 6l per nc.V-paced 70s-100s with underlying Afib, 0-10 PVCs. Pt somnulent. Coccyx reddened, turned.   A: Stable.  P: Monitor and assist as needed.

## 2017-05-06 NOTE — PROGRESS NOTES
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 H/o multiple admissions with heart failure exacerbations.    #) Acute on chronic systolic heart failure  #) HFrEF  #)MVR s/p mitral clip placement   Considered infectious vs cardiac etiology, CXR appeared to be pulmonary edema. Echo with EF 30-35% with moderate diffuse hypokinesis. No WBC or fever, procal 0.58, influenza and RVP negative.   - Diuresis with Bumex gtt at 0.5 mg/hr after 1 mg bolus  - Continue home medications: lisinopril 2.5mg, metoprolol 12.5mg Xl , amiodarone 200 mg  - Oxycodone 2.5 mg BID PRN for air hunger  - Goals of Care discussion as below  - Strict I and O, daily weight (goal weight is 180lbs)  - K>4, Mag>2, check BID with gtt     #) Altered mental status:  Previously on opiate meds -including morphine, oxycodone and also previously on methadone, recently discontinued. Some, likely chronic hypercapnea from VANDA and refusal to wear CPAP/BiPAP. Negative CT Head, TSH as below.  - Encourage CPAP  - Oxycodone as above, otherwise avoiding sedating meds     #) Dark Stool  Noted to have dark, bloody appearing stool in am 5/6. Large hard stool with decompaction day prior and possibly due to hemorrhoids.   - CBC check today and closely monitor  - Encourage stool softeners    #) Goals of care  End Stage heart failure. Ongoing discussions in past about taking more of a palliative approach. Recently discussed between outpatient Cardiology, patient's nursing home, and patient's sister.   -  Confirmed with team on admission and with primary team that wants to focus on being comfortable; DNR/DNI status, also confirmed this with his POA, sister Kallie who is a physician  - Palliative consult, recommendation for symptomatic relief of air hunger as above, plan further discussion Monday     #) Hypothyroidism:  TSH elevated on admission at 13 with T$ 1.33 (Jan 2017 TSH 12.94 with T4 1.18). Reportedly with recent adjustment of levothyroxine in setting of ongoing lethergy, unclear if actually occurred.   - Continue with levothyroxine 175 mcg daily, consider increase     #) HTN:  Continue with lisinopril 2.5 mg, metoprolol 12.5 mg XL     #)Venous stasis ulcer:  - DVT US negative  - Wound Consult today     #)Physical deconditioning  -PT/OT     FEN: NPO given altered mental status  Prophylaxis: DVT: enoxaparin   Disposition: pending further evaluation by primary team  Code Status: FULL CODE    Patient seen and discussed with Dr. Martinez, who agrees with above plan.    Tania Falcon MD PhD  Internal Medicine PGY-2  [phone]        Interval History:   Increase of oxygen requirements later in the day, MAPs 90-110s and concern for worsening pulmonary edema. Breathing via mouth but refused face mask or BiPAP. IV hydralazine given. Later more awake, sitting up in chair eating late dinner. Additional lasix overnight. Bloody dark stool in am.         Medications:   Reviewed. Please see EPIC for details.           Physical Exam:   Vitals were reviewed  Blood pressure 113/77, pulse 102, temperature 98.3  F (36.8  C), temperature source Oral, resp. rate 18, height 1.829 m (6'), weight 96.2 kg (212 lb 1.6 oz), SpO2 93 %.    I&O's:   Intake/Output Summary (Last 24 hours) at 05/06/17 0718  Last data filed at 05/06/17 0502   Gross per 24 hour   Intake              770 ml   Output             1325 ml   Net             -555 ml     Physical Exam:   General: Alert, sitting up in chair  HEENT: No Scleral icterus. NCAT, MMM.  PERRL, EOMI  Cardiac: Normal rate, regular rhythm. No m/r/g. Normal S1, S2, JVD elevated   Pulm: CTAB anterior, increased resp effort and bilateral faint crackles in bases  Abd: Distended, firm and subcutaneous edema on exam, bowel sounds present  Skin: No jaundice, presence of venous stasis ulcer on lower extremity, most significant left heel  Extremities: 1+ eddema  Neuro: A&Ox2, speech intact, moving all extremities    Labs reviewed

## 2017-05-06 NOTE — PLAN OF CARE
Problem: Goal Outcome Summary  Goal: Goal Outcome Summary  OT6C:  Pt seen for evaluation and skilled OT tx.  Pt was lethargic and presented with slow processing.  Pt completed bed mobility with Chase ARROYO, unable to completely roll.  Completed BUE exer in bed with JAQUELINE for the LUE.  Limited participation.  D/C Rec: TCU.

## 2017-05-06 NOTE — PROGRESS NOTES
05/06/17 0900   Quick Adds   Type of Visit Initial PT Evaluation   Living Environment   Lives With facility resident   Living Arrangements (Has been in nursing home/TCU for last several weeks/months)   Living Environment Comment Pt arrived to hospital from rehab facility, unable to gather furhter information about living situation prior from pt, use of chart review.   Self-Care   Dominant Hand right   Usual Activity Tolerance fair   Current Activity Tolerance poor   Regular Exercise (Getting PT 5-6x/week)   Equipment Currently Used at Home walker, rolling;wheelchair   Activity/Exercise/Self-Care Comment Per pt he was receiving assist for all ADL's at facility, minimal details given.  Pt reports using a walker to walk some however also with use of w/c, pt receiving PT at facility 5-6x/week per pt.   Functional Level Prior   Ambulation 3-->assistive equipment and person   Transferring 3-->assistive equipment and person   Toileting 3-->assistive equipment and person   Bathing 3-->assistive equipment and person   Dressing 3-->assistive equipment and person   Eating 0-->independent   Communication 0-->understands/communicates without difficulty   Swallowing 0-->swallows foods/liquids without difficulty   Cognition 1 - attention or memory deficits   Fall history within last six months no   Which of the above functional risks had a recent onset or change? transferring;ambulation   Prior Functional Level Comment Prior to admission to hospital while in facility pt ambulating short distances with therapy and use of walker.   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 H/o multiple admissions with heart failure exacerbations   Precautions/Limitations fall precautions   Weight-Bearing Status - LUE full weight-bearing   Weight-Bearing Status - RUE full weight-bearing   Weight-Bearing Status - LLE full weight-bearing   Weight-Bearing Status - RLE full weight-bearing   General Observations On 5L O2 (accessory breathing), difficult to encourage for participation, very sleepy   Cognitive Status Examination   Orientation person;place   Level of Consciousness lethargic/somnolent   Follows Commands and Answers Questions 50% of the time;able to follow single-step instructions;unable to follow multi-step instructions   Personal Safety and Judgment intact   Memory intact   Posture    Posture Protracted shoulders;Forward head position   Range of Motion (ROM)   ROM Comment B EULALIO's WFL   Strength   Strength Comments B EULALIO's grossly 3+/5   Bed Mobility   Bed Mobility Comments Mod-maxA x 2 for bed mobility   Transfer Skills   Transfer Comments ModA x 2 for transfers   Gait   Gait Comments Unable to assess   Balance   Balance Comments Impaired standing balance, posterior lean, requires walker to maintain standing balance   Sensory Examination   Sensory Perception Comments Impaired sensation in B feet   General Therapy Interventions   Planned Therapy Interventions bed mobility training;transfer training;gait training;balance training;neuromuscular re-education;strengthening;risk factor education;home program guidelines;progressive activity/exercise   Clinical Impression   Criteria for Skilled Therapeutic Intervention yes, treatment indicated   PT Diagnosis Impaired functional mobility   Influenced by the following impairments Decreased strength, balance and activity tolerance   Functional  "limitations due to impairments Inability to perform functional mobility at baseline level of functioning   Clinical Presentation Evolving/Changing   Clinical Presentation Rationale Medical complexity, on 5L O2, evolving medical status, loss of function   Clinical Decision Making (Complexity) Moderate complexity   Therapy Frequency` (6x/week)   Predicted Duration of Therapy Intervention (days/wks) 2 weeks   Anticipated Discharge Disposition Transitional Care Facility   Risk & Benefits of therapy have been explained Yes   Patient, Family & other staff in agreement with plan of care Yes   Clinical Impression Comments Pt would benefit from in-patient PT in order to increase strength and IND with functional mobility.   [facility] AM-PAC TM \"6 Clicks\"   2016, Trustees of [facility], under license to [employer].  All rights reserved.   6 Clicks Short Forms Basic Mobility Inpatient Short Form   [facility] AM-PAC  \"6 Clicks\" V.2 Basic Mobility Inpatient Short Form   1. Turning from your back to your side while in a flat bed without using bedrails? 2 - A Lot   2. Moving from lying on your back to sitting on the side of a flat bed without using bedrails? 2 - A Lot   3. Moving to and from a bed to a chair (including a wheelchair)? 2 - A Lot   4. Standing up from a chair using your arms (e.g., wheelchair, or bedside chair)? 2 - A Lot   5. To walk in hospital room? 2 - A Lot   6. Climbing 3-5 steps with a railing? 1 - Total   Basic Mobility Raw Score (Score out of 24.Lower scores equate to lower levels of function) 11   Total Evaluation Time   Total Evaluation Time (Minutes) 10     "

## 2017-05-07 NOTE — PLAN OF CARE
Problem: Goal Outcome Summary  Goal: Goal Outcome Summary  Outcome: No Change  DIAP: on Bumex gtt diuresing.  FR of 1.5L  Restless most of the day. Is having trouble breathing because his abdomen is so distended from ascites. He is frequently needing increased O2 needs on NC. Is refusing CPAP. LE wounds have been redressed. Some serous drainage.  Is only able to stand and pivot with assist of 2. Has good appetite.

## 2017-05-07 NOTE — PROGRESS NOTES
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 H/o multiple admissions with heart failure exacerbations.    #) Acute on chronic systolic heart failure  #) HFrEF  #)MVR s/p mitral clip placement   Considered infectious vs cardiac etiology, CXR appeared to be pulmonary edema. Echo with EF 30-35% with moderate diffuse hypokinesis. No WBC or fever, procal 0.58, influenza and RVP negative.   - Diuresis with Bumex gtt at 0.5 mg/hr, s/p 500 mg Diuril overnight  - Watching creatinine daily as trending up  - Continue home medications: lisinopril 2.5mg, metoprolol 12.5mg Xl , amiodarone 200 mg  - Oxycodone 2.5 mg BID PRN for air hunger  - Goals of Care discussion as below  - Strict I and O, daily weight (goal weight is 180lbs)  - K>4, Mag>2, check BID with gtt     #) Altered mental status:  Previously on opiate meds -including morphine, oxycodone and also previously on methadone, recently discontinued. Some, likely chronic hypercapnea from VANDA and refusal to wear CPAP/BiPAP. Negative CT Head, TSH as below.  - Encourage CPAP  - Oxycodone as above, otherwise avoiding sedating meds     #) Dark Stool  Noted to have dark, bloody appearing stool in am 5/6. Large hard stool with decompaction day prior and possibly due to hemorrhoids, Hgb stable.  - Encourage stool softeners    #) Goals of care  End Stage heart failure. Ongoing discussions in past about taking more of a palliative approach. Recently discussed between outpatient Cardiology, patient's nursing home, and  patient's sister. Conversations with sister 5/6 who confirmed that would not want heroic actions, think focus on comfort reasonable.   - Made DNR/DNI on admission  - Palliative consult, recommendation for symptomatic relief of air hunger as above, plan further discussion Monday     #) Hypothyroidism:  TSH elevated on admission at 13 with T3 1.33 (Jan 2017 TSH 12.94 with T4 1.18). Reportedly with recent adjustment of levothyroxine in setting of ongoing lethergy, unclear if actually occurred.   - Continue with levothyroxine 175 mcg daily, consider increase     #) HTN:Continue with lisinopril 2.5 mg, metoprolol 12.5 mg XL     #)Venous stasis ulcer: DVT US negative, appreciate recs from Wound     #)Physical deconditioning  -PT/OT     FEN: Na and fluid restriction  Prophylaxis: DVT: enoxaparin   Disposition: pending further evaluation by primary team  Code Status: FULL CODE    Patient seen and discussed with Dr. Martinez, who agrees with above plan.    Tania Falcon MD PhD  Internal Medicine PGY-2  [phone]        Interval History:   Wahl placed in afternoon after significant retention on bladder scan. Call patient's sister [place] of state to update. Sister is POA, has discussed care with other sibling. States she believes that patient will not get better, believes is reasonable to focus on comfort, believes patient does not want heroic measures.     No acute events overnight, given dose of diuril to help with diuresis. This morning visiting with friends, no new complaints.         Medications:   Reviewed. Please see EPIC for details.           Physical Exam:   Vitals were reviewed  Blood pressure 104/66, pulse 102, temperature 97.4  F (36.3  C), temperature source Axillary, resp. rate 20, height 1.829 m (6'), weight 96.4 kg (212 lb 8.4 oz), SpO2 94 %.    I&O's:   Intake/Output Summary (Last 24 hours) at 05/07/17 0702  Last data filed at 05/07/17 0659   Gross per 24 hour   Intake          1092.67 ml   Output              1975 ml   Net          -882.33 ml     Physical Exam:   General: Alert, lying in bed  HEENT: No Scleral icterus. NCAT, MMM. PERRL, EOMI  Cardiac: Normal rate, regular rhythm, no additional murmurs or rubs  Pulm: CTAB anteriorly with crackles side bases, some increased respiratory effort  Abd: Distended, firm and subcutaneous edema on exam, bowel sounds present  Skin: No jaundice, presence of venous stasis ulcer on lower extremity, most significant left heel  Extremities: 1+ edema, healing stasis ulcers  Neuro: A&Ox2, speech intact, moving all extremities    Labs reviewed

## 2017-05-07 NOTE — PLAN OF CARE
Problem: Goal Outcome Summary  Goal: Goal Outcome Summary  PT / 6C: Pt performs sit <> stand transfers with FWW + ModA x 2. Pt requires Edis x 2 for standing balance with FWW, pt also with limited standing tolerance of 1-2 minutes before needing to sit down. Pt engaged in seated exercises for improved strength and standing pre-gait exercises.   REC: TCU upon d/c in order to increase strength and IND with functional mobility.

## 2017-05-07 NOTE — PLAN OF CARE
Problem: Individualization  Goal: Patient Preferences  D: Pt stable, AVSS, afebrile. Bumex gtt at 0.5 mg/hr. One time diuril given. Mag and K replaced. Stool x 1 with no signs of bleeding. Foot and ankle pain relieved with oxycodone. NERI. 6 LNC with bubbler. Heavy assist of 2. Up to chair intermittently overnight. IV Abx given as ordered.  I: Monitored/assessed pt. Assisted with cares.  A: Pt stable and comfortable.  P: Continue to monitor/assess pt, contact provider with concerns.

## 2017-05-08 NOTE — PHARMACY-VANCOMYCIN DOSING SERVICE
Pharmacy Vancomycin Note  Date of Service May 8, 2017  Patient's  1950   66 year old, male    Indication: Healthcare-Associated Pneumonia  Goal Trough Level: 15-20 mg/L  Day of Therapy: 4  Current Vancomycin regimen: Previously on 1750 mg IV Q12H, currently holding further doses due to high levels    Current estimated CrCl = Estimated Creatinine Clearance: 54.3 mL/min (based on Cr of 1.65).    Creatinine for last 3 days  2017:  8:32 AM Creatinine 1.45 mg/dL;  8:00 PM Creatinine 1.49 mg/dL  2017:  9:34 AM Creatinine 1.57 mg/dL  2017:  6:50 AM Creatinine 1.65 mg/dL    Recent Vancomycin Levels (past 3 days)  2017:  7:48 PM Vancomycin Level 46.4 mg/L  2017:  4:38 PM Vancomycin Level 36.5 mg/L    Vancomycin IV Administrations (past 72 hours)                   vancomycin (VANCOCIN) 1,750 mg in NaCl 0.9 % 500 mL intermittent infusion (mg) 1,750 mg New Bag 17 0803     1,750 mg New Bag 17     1,750 mg New Bag  0916     1,750 mg New Bag 17                Nephrotoxins and other renal medications (Future)    Start     Dose/Rate Route Frequency Ordered Stop    17 214  vancomycin (VANCOCIN) 1,750 mg in NaCl 0.9 % 500 mL intermittent infusion      1,750 mg Intravenous HOLD 17 2131      17 1130  bumetanide (BUMEX) 0.25 mg/mL infusion      0.5 mg/hr  2 mL/hr  Intravenous CONTINUOUS 17 1126      17 0800  lisinopril (PRINIVIL/Zestril) tablet 2.5 mg      2.5 mg Oral DAILY 17 0411      17 0500  piperacillin-tazobactam (ZOSYN) 4.5 g vial to attach to  mL bag      4.5 g  over 1 Hours Intravenous EVERY 6 HOURS 17 0432               Contrast Orders - past 72 hours     None          Interpretation of levels and current regimen:  Trough level is above goal and was drawn approximately 32.5 hrs after last dose was given. Using yesterday and today's vancomycin levels, the following pharmacokinetics were calculated:  Ke =  0.23842  Half-Life = 59.9 hours    Has serum creatinine changed > 50% in last 72 hours: No, however SCr is trending up  Urine output:  good urine output  Renal Function: relatively stable, with SCr trending up slightly    Plan:  1.  Using previously mentioned calculated pharmacokinetics, I estimate the patients vancomycin levels to be above 20 for a couple days. Will continue to hold vancomycin and will plan to recheck a vancomycin level on Wednesday, unless patient's clinical status changes and sooner level is indicated.   2. Serum creatinine levels will be ordered daily for the first week of therapy and at least twice weekly for subsequent weeks.      Nelli Marley, PharmD, BCPS  Pager [phone]      .

## 2017-05-08 NOTE — PHARMACY-VANCOMYCIN DOSING SERVICE
Pharmacy Vancomycin Note  Date of Service May 7, 2017  Patient's  1950   66 year old, male    Indication: Healthcare-Associated Pneumonia  Goal Trough Level: 15-20 mg/L  Day of Therapy: 3  Current Vancomycin regimen:  1750 mg IV q12h    Current estimated CrCl = Estimated Creatinine Clearance: 57.2 mL/min (based on Cr of 1.57).    Creatinine for last 3 days  2017:  5:57 AM Creatinine 1.36 mg/dL;  4:59 PM Creatinine 1.35 mg/dL  2017:  8:32 AM Creatinine 1.45 mg/dL;  8:00 PM Creatinine 1.49 mg/dL  2017:  9:34 AM Creatinine 1.57 mg/dL    Recent Vancomycin Levels (past 3 days)  2017:  7:48 PM Vancomycin Level 46.4 mg/L    Vancomycin IV Administrations (past 72 hours)                   vancomycin (VANCOCIN) 1,750 mg in NaCl 0.9 % 500 mL intermittent infusion (mg) 1,750 mg New Bag 17 0803     1,750 mg New Bag 17     1,750 mg New Bag  0916     1,750 mg New Bag 17     1,750 mg New Bag  0715                Nephrotoxins and other renal medications (Future)    Start     Dose/Rate Route Frequency Ordered Stop    17 2145  vancomycin (VANCOCIN) 1,750 mg in NaCl 0.9 % 500 mL intermittent infusion      1,750 mg Intravenous HOLD 17 2131      17 1130  bumetanide (BUMEX) 0.25 mg/mL infusion      0.5 mg/hr  2 mL/hr  Intravenous CONTINUOUS 17 1126      17 0800  lisinopril (PRINIVIL/Zestril) tablet 2.5 mg      2.5 mg Oral DAILY 17 0411      17 0500  piperacillin-tazobactam (ZOSYN) 4.5 g vial to attach to  mL bag      4.5 g  over 1 Hours Intravenous EVERY 6 HOURS 17 0432               Contrast Orders - past 72 hours     None          Interpretation of levels and current regimen:  Trough level is  Supratherapeutic    Has serum creatinine changed > 50% in last 72 hours: No, but renal function worsening    Urine output:  Good urine output (duiresing)     Renal Function: Worsening    Plan:  1.  hold vancomycin dosing and redose when trough  is under 20 mg/L.   2.  Pharmacy will check trough levels as appropriate in 1-3 Days.    3. Serum creatinine levels will be ordered daily for the first week of therapy and at least twice weekly for subsequent weeks.      Bobbi Lawler, PharmD        .

## 2017-05-08 NOTE — PLAN OF CARE
Problem: Cardiac: Heart Failure (Adult)  Goal: Signs and Symptoms of Listed Potential Problems Will be Absent or Manageable (Cardiac: Heart Failure)  Signs and symptoms of listed potential problems will be absent or manageable by discharge/transition of care (reference Cardiac: Heart Failure (Adult) CPG).   Outcome: Improving  D:Up out of bed to chair for meals and a third time towards end of shift (patient request).   No complaints of chest pain or shortness of breath.  Lungs are coarse bilaterally.   Coarse crackles in the lower lobes bilaterally with diminished breath sounds.  O2 95% on 6L/NC.   He has generalized edema.  Bumex drip at 0.5mg/hr.   Continue on IV antibiotics as ordered.  Temp 97..6  Oral. NSR 80, with no ectopy.  Bp 97/80 MAP 86.   Complained of pain in feet,  Ankles and legs.  Medicated with po pain medication.  Transfers from bed to chair with assist of two.  Is able to  feet and take a few steps.       A:Is doing better.  Moves fair with assist of two.  Is a heavy assist of two.  Does get short of breath with exertion.  No acute distress.  Is able to maintain adequate O2 sats with increased activity. Lungs sound the same.  Afebrile.  VSS.   Rhythm is stable.  No acute changes.  Tolerates sitting up in chair a few hours.  Mild pain in lower extremities.     P;Continue to get up out of bed to  Chair for meals.   Assess level of comfort.  Medicate prn pain.  Continue to assess respiratory status.  Spot check O2 sats with vital signs and prn any acute shortness of breath.  Continue to monitor temp and vital signs.  Monitor rhythm,  Notify MD with any acute changes in signs and symptoms or vital signs or O2 sats.

## 2017-05-08 NOTE — PLAN OF CARE
Problem: Individualization  Goal: Patient Preferences  D: Pt stable, AVSS, afebrile. Bumex gtt at 0.5 mg/hr. Foot and ankle pain relieved with oxycodone. NERI. 8 LNC with bubbler. Heavy assist of 2. Up to chair intermittently overnight. Vanco held for critical vanco level.  I: Monitored/assessed pt. Assisted with cares.  A: Pt stable and comfortable.  P: Continue to monitor/assess pt, contact provider with concerns.

## 2017-05-08 NOTE — PLAN OF CARE
Problem: Goal Outcome Summary  Goal: Goal Outcome Summary  PT 6C: Per chart review and discussion with OT, pt may only be appropriate for 1 discipline at this time as pt is near baseline level of function and palliative care is being considered. PT will hold at this time. Will check in tomorrow and assess for further acute PT needs.

## 2017-05-08 NOTE — PROVIDER NOTIFICATION
Critical vancomycin level of 46.4. Cards crosscover and Pharmacy notified by phone. 2000 dose held.

## 2017-05-08 NOTE — PROGRESS NOTES
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 H/o multiple admissions with heart failure exacerbations.    #) Acute on chronic systolic heart failure  #) HFrEF  #)MVR s/p mitral clip placement   Considered infectious vs cardiac etiology, CXR appeared to be pulmonary edema. Echo with EF 30-35% with moderate diffuse hypokinesis. No WBC or fever, procal 0.58, influenza and RVP negative.   - Clinically declining and actively discussing/moving towards comfort cares, per family will hold with current cares and not escalate further  - Diuresis with Bumex gtt at 0.5 mg/hr, will not escalate diuretics for now per patient's family  - Continue home medications: lisinopril 2.5mg, metoprolol 12.5mg Xl , amiodarone 200 mg  - Oxycodone 2.5 mg BID PRN for air hunger  - Strict I and O, daily weight (goal weight is 180lbs)  - K>4, Mag>2, check BID with gtt     #) Goals of care  End Stage heart failure. Ongoing discussions in past about taking more of a palliative approach. Recently discussed between outpatient Cardiology, patient's nursing home, and patient's sister. Conversations with sister 5/6 who confirmed that would not want heroic actions, think focus on comfort reasonable.   - Made DNR/DNI on admission  - Palliative evaluated patient today, spoke with patient's sister about comfort cares as option given declining status    #) Altered mental status:  Previously on opiate meds -including morphine, oxycodone and also previously on methadone, recently discontinued.  Some, likely chronic hypercapnea from VANDA and refusal to wear CPAP/BiPAP. Negative CT Head, TSH elevated as below. Today (5/8) again encephalopathic. Discussed with patient's sister who is POA and pediatric intensivist who decline further workup as does not think will alter their decisions on goals of care.  - Oxycodone for air hunger, otherwise avoiding sedating meds     #) Dark Stool  Noted to have dark, bloody appearing stool in am 5/6. Large hard stool with decompaction day prior and possibly due to hemorrhoids, Hgb stable. Nurses encouraging stool softeners.      #) Hypothyroidism:  TSH elevated on admission at 13 with T3 1.33 (Jan 2017 TSH 12.94 with T4 1.18). Reportedly with recent adjustment of levothyroxine in setting of ongoing lethergy, unclear if actually occurred.   - Continue with levothyroxine 175 mcg daily     #) HTN:Continue with lisinopril 2.5 mg, metoprolol 12.5 mg XL     #)Venous stasis ulcer: DVT US negative, appreciate recs from Wound     #)Physical deconditioning  -PT/OT     FEN: Na and fluid restriction  Prophylaxis: DVT: enoxaparin   Disposition: Family discussions about Hospice  Code Status: DNR/DNI    Patient seen and discussed with Dr. Love, who agrees with above plan.    Tania Falcon MD PhD  Internal Medicine PGY-2  [phone]        Interval History:   No acute events overnight. This morning initially lying in bed, responding to questions. Later in morning encephalopathy and more distressed appearing. Slight increase in O2 needs. Palliative and Primary team in touch with patient's POA who is his sister. Sister thinks family wants to do Hospice but waiting to hear back from family before would make comfort cares. Does not want escalation of cares. Has family friend who is local physician and will visit patient today. Sister planning to fly to [place] tomorrow.          Medications:   Reviewed. Please see EPIC for details.           Physical Exam:   Vitals were reviewed  Blood  pressure 102/65, pulse 102, temperature 97.8  F (36.6  C), temperature source Oral, resp. rate 18, height 1.829 m (6'), weight 95.9 kg (211 lb 6.4 oz), SpO2 94 %.    I&O's:     Intake/Output Summary (Last 24 hours) at 05/08/17 1709  Last data filed at 05/08/17 1200   Gross per 24 hour   Intake           689.97 ml   Output             1950 ml   Net         -1260.03 ml     Physical Exam:   General: Rouses with voice, lying in bed, minimally conversant  HEENT: No Scleral icterus. NCAT, MMM. PERRL, EOMI, lips dry and slightly bloody interior  Cardiac: Normal rate, regular rhythm, no additional murmurs or rubs  Pulm: CTAB anteriorly with crackles side bases, increased respiratory effort  Abd: Distended, firm and subcutaneous edema on exam, bowel sounds present  Skin: No jaundice, presence of venous stasis ulcer on lower extremity, most significant left heel  Extremities: 1+ edema, healing stasis ulcers  Neuro: Minimally attentive to questions, speaks few words at a time, moving less in bed    Labs reviewed    ATTENDING ATTESTATION:  Patient has been seen and evaluated by me on May 8, 2017. I have reviewed the medications, laboratory, imaging, and other relevant results.  I agree with the above note which I have edited, as necessary.  I have discussed my assessment and plan with the house staff.    Cathy Love MD, MS  Staff Cardiologist, [facility]  Pager: [phone]

## 2017-05-08 NOTE — CONSULTS
[facility]  Palliative Care Consultation    Scott Hebert MRN# [id number]   Age: 66 year old YOB: 1950   Date of Admission: 5/4/2017    Reason for consult: Goals of care       Requesting physician/service: Dr Martinez/cardiology service       Recommendations        1) Comfort care would be an appropriate option for him - he is acutely decompensating today and the chances that anything we do to him will restore him to decent function and stability in the community are small. In part because he's not been able to achieve that in the community recently anyway, doesn't have good, new, disease modifying options, etc. I tried to talk with him about this today but frankly he's too delirious. I have a phone call out to his sister and plan on talking with her about that, but d/w cards team too to consider discussing that with her if they make contact first.  Otherwise, if he's stable, offering him hospice at a facility, or a treatment plan with further limitations such as do-not-rehospitalize etc is appropriate. Pending patient or surrogate decision maker agreeing that is in his best interests which I haven't been able to do yet today.    2) Worsening encephalopathy: suspect rising pCO2. Opioid accumulation in context of declining GFR also possible. Consider empirically rotating opioid to hydromorphone 0.5mg po q2h prn pain/dyspnea    D/w cards team.  Will update note if I d/w sister.    Thank you for involving us in the patient's care.   El Walker MD / Palliative Medicine / Pager [phone] / [facility] Inpatient Team Consult Pager [phone] (answered 8am-430pm M-F) - ok to text page via [employer] / After-Hours Answering Service [phone] / Palliative Clinic in the [facility] - [phone] (scheduling); [phone] (triage).         Diagnoses & Symptoms     Ischemic cardiomyopathy  Delirium  MVR s/p mitral clip  VANDA on CPAP   Myoclonus and  asterixis  Multiple other problems    Support/Coping   Unable to assess    Decision-Making & Goals of Care     Discussion/counseling today about prognosis/goals of care/decisions:   As above  Patient has a completed health care directive available in the chart (Y/N): no  Health care agent (only if patient has an available, complete HCD):  Physician orders for life-sustaining treatment (POLST) form is not completed: has nursing home full code order  Code Status: Do not resusitate / Do not intubate - just this hospitalization   Patient has decision-making capacity (Y/N): not today    Coordination of Care     Findings & plan of care discussed with: cardiology team  Follow-up plan from palliative team: will follow  Thank you for involving us in the patient's care.   El Walker MD / Palliative Medicine / Pager [phone] / [facility] Inpatient Team Consult Pager [phone] (answered 8am-430pm M-F) - ok to text page via [employer] / After-Hours Answering Service [phone] / Palliative Clinic in the [facility] - [phone] (scheduling); [phone] (triage).          Chief Complaint     Goals consult         History of Present Illness     History obtained from: chart, patient, cardiology team    He is obviously encephalopathic when I see him and can't get much history or engage in conversation.  He tells me it's ok to talk with Mauricio - I left her a voicemessage to call me today.    He denies pain or SOB    I can't really have a goals discussion with him--he's too delirious.  In talking with cards team it sounds like he is acutely decompensating today            Social/Spiritual History     Living situation: Sister is main contact; he told me today he wants her involved with decisions. No HCD though  Family system:   Employment/education:   Use of community resources:   Activities/interests:   History of substance use/abuse:   Social History     Social History     Marital status: Single      Spouse name: N/A     Number of children: N/A     Years of education: N/A     Social History Main Topics     Smoking status: Former Smoker     Packs/day: 0.25     Years: 20.00     Types: Cigarettes     Quit date: 3/6/2009     Smokeless tobacco: Never Used     Alcohol use No     Drug use: No     Sexual activity: Yes     Partners: Female     Birth control/ protection: Surgical     Other Topics Concern     Parent/Sibling W/ Cabg, Mi Or Angioplasty Before 65f 55m? No     Social History Narrative             PMH, PSH, Family History     Family History   Problem Relation Age of Onset     HEART DISEASE Mother      Respiratory Father      Past Medical History:   Diagnosis Date     Abdominal aneurysm without mention of rupture 10/12/2005    repaired 2000     Atrial fibrillation (H)     ablation 1-     Brain aneurysm 12/2/2010    S/p clipping of 3 aneurysm 8/10       Cerebral aneurysm 7/20/2010     CHF exacerbation (H)      Chronic pain 10/12/2005    has chronic pain right foot compartment syndrome requiring narcotic  Methadone 10 mg (75/month) Pain contract signed with marciano Castrejon      CKD (chronic kidney disease) stage 3, GFR 30-59 ml/min 6/15/2011     Community acquired bacterial pneumonia 11/7/2011     COPD (chronic obstructive pulmonary disease) (H) 5/31/2011     Cor athrscl-uns vessel     3/06: NSTEMI in setting of paroxysmal a-fib with RVR.  Stent to RCA.  Echo with EF 45%. Angio on 7/2/09 showed widely patent stent with moderate focal eccentric stenosis at distal stent edge.. Mid rca 60%, proximal rca 50% 1 st om 20% ,  Mid lad 40% --Intracronary measeuement of mid rca showed FFR of . 94      Diabetes mellitus (H)      MRSA     SITE - ARM WOUND 2/27/04     Obese      VANDA-Severe (AHI 67) 2/16/2009    Sleep study  2/12/09 to evaluate poor quality of sleep, unrefreshed sleep, abnormal nocturnal oximetry. OEQ037.5 minutes,  latency prolonged 28.3 minutes. Sleep efficiency reduced 81.2%.  Architecture  disrupted with sleep stage changes and arousals. Snoring: very loud. RDI 72.4 and AHI 66.9. REM RDI 80.0.  LO2 73.0%. CPAP Titration:improvement; but not elimination of apneas, hypopneas, desaturatio     OTHER LUNG DISEASE NEC     3/25/06: CT chest showing right hilar and mediastinal adenopathy-stable 7/09     Other specified acquired hypothyroidism     s/p GARRETT     Other specified forms of chronic ischemic heart disease     3/06: EF 45% at time of stent in RCA     Pain in limb     Chronic Pain     Pneumonia 2/24/2009     PVCs (premature ventricular contractions) 12/2/2010    S/p ablation of the PVC's 11/10   (Problem list name updated by automated process. Provider to review and confirm.)     Thyrotoxicosis without mention of goiter or other cause, without mention of thyrotoxic crisis or storm     s/p GARRETT     Type 2 diabetes, HbA1C goal < 8% (H) 3/2/2011     Unspecified anemia 4/24/2008    No etiology found 2006.  Had hematuria-  Urology eval with cysto- unremarkable     Unspecified Peripheral Vascular Disease 9/6/2007    S/p Right femoral above-knee popliteal bypass utilizing in situ right greater saphenous vein 11/07      Past Surgical History:   Procedure Laterality Date     AMPUTATE TOE(S)  8/6/2014    Procedure: AMPUTATE TOE(S);  Surgeon: Yossi Graham DPM;  Location:  OR     AMPUTATE TOE(S) Right 1/13/2015    Procedure: AMPUTATE TOE(S);  Surgeon: Madhav Alarcon DPM;  Location: [facility]     C ABLATION SUPRAVENT ARRHYTHMOGENIC FOCUS  6/2010      C BYPASS GRAFT OTHR,AORTOFEMORAL  2000    Aortofemoral Bypass Graft     COLONOSCOPY  2/16/2011    COLONOSCOPY performed by VALERIA SWEET at [facility]     ESOPHAGOSCOPY N/A 7/29/2016    Procedure: ESOPHAGOSCOPY;  Surgeon: Fantasma Gallegos MD;  Location:  OR     ESOPHAGOSCOPY, GASTROSCOPY, DUODENOSCOPY (EGD), COMBINED  2/16/2011    COMBINED ESOPHAGOSCOPY, GASTROSCOPY, DUODENOSCOPY (EGD) performed by VALERIA SWEET at [facility]     ESOPHAGOSCOPY,  "GASTROSCOPY, DUODENOSCOPY (EGD), COMBINED N/A 11/3/2016    Procedure: COMBINED ESOPHAGOSCOPY, GASTROSCOPY, DUODENOSCOPY (EGD);  Surgeon: Imer Layne MD;  Location:  GI     ESOPHAGOSCOPY, GASTROSCOPY, DUODENOSCOPY (EGD), COMBINED N/A 12/23/2016    Procedure: COMBINED ESOPHAGOSCOPY, GASTROSCOPY, DUODENOSCOPY (EGD), BIOPSY SINGLE OR MULTIPLE;  Surgeon: Kayla Sepulveda MD;  Location:  GI     HERNIA REPAIR, INCISIONAL  3-6-09     INCISION AND DRAINAGE FINGER, COMBINED Left 12/12/2014    Procedure: COMBINED INCISION AND DRAINAGE FINGER;  Surgeon: Wang Solis MD;  Location: [facility]     LARYNGOSCOPY WITH BIOPSY(IES) N/A 7/29/2016    Procedure: LARYNGOSCOPY WITH BIOPSY(IES);  Surgeon: Fantasma Gallegos MD;  Location:  OR     PERCUTANEOUS MITRAL VALVE REPAIR N/A 10/11/2016    Procedure: PERCUTANEOUS MITRAL VALVE REPAIR ANESTHESIA;  Surgeon: Rosana Balderrama MD;  Location:  OR     SURGICAL HISTORY OF -   11/07    Right femoral above-knee popliteal bypass utilizing in situ right greater saphenous vein.              Allergies   Allergies   Allergen Reactions     Transfusion (Informational Only) [Blood Transfusion Related (Informational Only)] Other (See Comments)     Patient has a history of a clinically significant antibody against RBC antigens.  A delay in compatible RBCs may occur.     Erythromycin Other (See Comments)     \"decreases the half life of my methadone\"              Medications   I have reviewed this patient's medications during this hospitalization  Noted are:  Gabapentin 200 hs  Acetaminophen  Multiple heart meds  oxyIR 2.5mg prn: 1-2x a day             Review of Systems   Cannot obtain due to mental status  Denies SOB now  Denies pain now            Physical Exam   Temp: 97.4  F (36.3  C) Temp src: Skin BP: 101/64   Heart Rate: 77 Resp: 16 SpO2: 99 % O2 Device: Nasal cannula Oxygen Delivery: 6 LPM  Wt Readings from Last 5 Encounters:   05/07/17 95.9 kg (211 lb 6.4 " oz)   05/02/17 99.1 kg (218 lb 8 oz)   01/24/17 84.5 kg (186 lb 3.2 oz)   01/23/17 85.7 kg (189 lb)   01/13/17 84.4 kg (186 lb)     Tired, listless  Resps sl labored, mouth open, regular, sl tachypneic  Head NCAT  Eyes anicteric no injection  Mouth dry no lesions  CV rrr s1s2  Lungs bibasilar crackles  Abd distended, nontender  Skin chronic venous stasis changes bilat LE, woody edema bilat,   Anasarca incluidng scrotal edema, Wahl  Joints of hands and LE grossly nl to palpation bilat  Neuro: listless, engages in conversation only a little, spont falls back asleep, +asterixis and multifocal myoclonus throughout           Data Reviewed   All relevant imaging reviewed, my comments:   CTOH nothing acute, +diffuse volume loss    echo--  Moderately (EF 30-35%) reduced left ventricular function with moderate diffuse  hypokinesis is present.  Global right ventricular function is mildly reduced.  There are two mitral clips in place with mild to moderate mitral regurgitation  present. There is no significant stenosis (Mn grad 3.0mmHg).  Mild pulmonary hypertension with right ventricular systolic pressure 34mmHg  above the right atrial pressure.  Dilation of the inferior vena cava is present with abnormal respiratory  variation in diameter. Estimated right atrial pressure is > 15 mmHg.  No pericardial effusion is present.  Compared to the previous study dated 1/2017, there is an increase in right  atrial pressure.  Sinuses of Valsalva 4.3 cm.  All relevant labs reviewed, my comments: eGFR 42, Cr overall rising slightly  Na 133  Alb 2.5  7.44/51/66/34

## 2017-05-08 NOTE — PROGRESS NOTES
Brief Social Work Note    SW faxed updated records to [facility] [place].  Unable to complete full assessment with pt today.  Will attempt to complete assessment tomorrow with pt.    MARIMAR Gates, APSW  [address]   Pager: [phone]  Phone: [phone]

## 2017-05-09 NOTE — PROGRESS NOTES
"[facility], [place]   Palliative Care Daily Progress Note         Recommendations & Counseling     Scott Hebert is a 66 year old male with history of ICM, mitral valve regurgitation s/p clip, VANDA, and CKD who was admitted with acute decompensated heart failure with hypervolemia and increased shortness of breath. This is his 7th hospitalization in the past year, the cardiology team is concerned he will continue to require hospitalizations with his poor heart function. Palliative consult was placed to address goals of care given worsening heart function.    #Goals of Care: Cardiology team has been having ongoing discussions with Scott's sister Mauricio (POA) and him over the past few days. During our conversation today, Scott states he believes a hospice approach would be best for him. He understands what hospice care entails and stated, \"I know I will go to heaven\". This is certainly appropriate, and it appears his sister is in agreement as well. Mauricio is planning on flying in tomorrow, we will plan to touch base with her after she has arrived. Currently, the plan is to not escalate cares (continuing with diuresis and labs per cardiology team) and we will reassess once Mauricio has arrived. Anticipate Scott will leave the hospital with hospice.  -Agree with no escalation of cares  -Will meet with Mauricio once she arrives    #Symptom management  #Insomnia  #Pain: Lower extremity and back pain are the most concerning symptoms for Scott at the moment. There has been hesitation to provide too much medication given his intermittent somnolence. When we spoke with him this morning, he was awake and alert and indicating he would like more pain medication. Given renal impairment would avoid morphine and use dilaudid 1 mg q6 hours per cardiology team. Was previously on high dose of methadone, would not restart this now but may be something to consider over time with assessment with the hospice team. " Additionally, given the fact he has been awake throughout the night and more drowsy during the day--and that he reports hx insomnia at home-- would recommend low dose seroquel 12.5 mg at bedtime with additional 12.5 mg prn if needed.  -dilaudid 1 mg q6h prn--if tolerating well and still having pain would increase dose/frequency over time  -seroquel 12.5 mg qhs plus prn dose    Patient seen and discussed with Dr. Marcela Leavitt.    Magdy Haynes  Internal Medicine PGY1    Patient seen and evaluated with Dr. Haynes.   Agree with assessment and recommendations.    Total time spent was 45 minutes,  >50% of time was spent counseling and/or coordination of care regarding symptoms, goals.    Lazara Leavitt  Palliative Care   Pager [phone]      Inter-disciplinary (nurses, physicians, clinical , chaplains) plan of care is discussed daily Monday-Friday in palliative care team rounds for all our patients.           Disease Process/es & Symptoms     ICM with acute decompensation  JESUS on CKD  Insomnia  Lower extremity pain  VANDA          Interval History:   Per nursing was awake all night. Continues to have pain in his legs and back, would like more pain medication. Remembers conversations about hospice and feels this would be the best option for him.           Medications:   Current Facility-Administered Medications   Medication     HYDROmorphone (DILAUDID high concentration) (HIGH CONC) solution 1 mg     QUEtiapine (SEROquel) half-tab 12.5 mg     vancomycin (VANCOCIN) 1,750 mg in NaCl 0.9 % 500 mL intermittent infusion     bumetanide (BUMEX) 0.25 mg/mL infusion     acetaminophen (TYLENOL) tablet 1,000 mg     albuterol (PROAIR HFA/PROVENTIL HFA/VENTOLIN HFA) Inhaler 2 puff     allopurinol (ZYLOPRIM) tablet 300 mg     amiodarone (PACERONE/CODARONE) tablet 200 mg     aspirin chewable tablet 81 mg     atorvastatin (LIPITOR) tablet 20 mg     bisacodyl (DULCOLAX) Suppository 10 mg     calcium carb 1250 mg  (500 mg [place])/vitamin D 200 units (OSCAL with D) per tablet 1 tablet     ferrous gluconate (FERGON) tablet 324 mg     gabapentin (NEURONTIN) capsule 200 mg     guaiFENesin (MUCINEX) 12 hr tablet 600 mg     ipratropium - albuterol 0.5 mg/2.5 mg/3 mL (DUONEB) neb solution 3 mL     levothyroxine (SYNTHROID/LEVOTHROID) tablet 175 mcg     lidocaine (LIDODERM) 5 % Patch 1 patch     lisinopril (PRINIVIL/Zestril) tablet 2.5 mg     magnesium oxide (MAG-OX) tablet 400 mg     Melatonin tablet 10 mg     metoprolol (TOPROL-XL) half-tab 12.5 mg     fluticasone-vilanterol (BREO ELLIPTA) 200-25 MCG/INH oral inhaler 1 puff     multivitamin, therapeutic with minerals (THERA-VIT-M) tablet 1 tablet     pantoprazole (PROTONIX) EC tablet 40 mg     polyethylene glycol (MIRALAX/GLYCOLAX) Packet 17 g     potassium chloride SA (K-DUR/KLOR-CON M) CR tablet 20 mEq     senna-docusate (SENOKOT-S;PERICOLACE) 8.6-50 MG per tablet 2 tablet     sodium chloride (OCEAN) 0.65 % nasal spray 2 spray     umeclidinium (INCRUSE ELLIPTA) 62.5 MCG/INH oral inhaler 1 puff     naloxone (NARCAN) injection 0.1-0.4 mg     lidocaine 1 % 1 mL     lidocaine (LMX4) kit     sodium chloride (PF) 0.9% PF flush 3 mL     sodium chloride (PF) 0.9% PF flush 3 mL     enoxaparin (LOVENOX) injection 40 mg     potassium chloride SA (K-DUR/KLOR-CON M) CR tablet 20-40 mEq     potassium chloride (KLOR-CON) Packet 20-40 mEq     potassium chloride 10 mEq in 100 mL intermittent infusion     potassium chloride 10 mEq in 100 mL intermittent infusion with 10 mg lidocaine     potassium chloride 20 mEq in 50 mL intermittent infusion     magnesium sulfate 2 g in NS intermittent infusion (PharMEDium or FV Cmpd)     magnesium sulfate 4 g in 100 mL sterile water (premade)     polyethylene glycol (MIRALAX/GLYCOLAX) Packet 17 g     ondansetron (ZOFRAN-ODT) ODT tab 4 mg    Or     ondansetron (ZOFRAN) injection 4 mg     No lozenges or gum should be given while patient on BIPAP      hypromellose-dextran (ARTIFICAL TEARS) ophthalmic solution 1 drop     lactulose (CHRONULAC) solution 20 g     lidocaine (LIDODERM) patch REMOVAL     lidocaine (LIDODERM) Patch in Place     piperacillin-tazobactam (ZOSYN) 4.5 g vial to attach to  mL bag             Review of Systems:   Positive for pain in lower extremities and back, negative for anxiety/SOB/nausea       Vital signs:   Heart Rate: 86 (BPM), Blood pressure 118/75, pulse 79, temperature 97.5  F (36.4  C), temperature source Oral, resp. rate 16, height 1.829 m (6'), weight 95.9 kg (211 lb 6.4 oz), SpO2 94 %.  Estimated body mass index is 28.67 kg/(m^2) as calculated from the following:    Height as of this encounter: 1.829 m (6').    Weight as of this encounter: 95.9 kg (211 lb 6.4 oz).  General: lying comfortably in bed in NAD, initially sleepy but when seen later awake and alert  Eyes: sclera clear  HEENT: mucus membranes dry  Lungs: clear bilaterally when listening anterior   Heart: normal rate, regular rhythm, no murmur/rub/gallop appreciated  Abdomen: distended, positive fluid wave  Ext: trace LE edema, chronic wounds over bilateral LE  Neuro: alert and oriented, fluent speech, no apparent focal deficits  Neuropsych: mood/affect congruent            Data Reviewed:   Laboratory and imaging data reviewed in EPIC

## 2017-05-09 NOTE — PLAN OF CARE
VSS. Tolerating 5-6L supplemental O2. Shortness of breath with activity. Small nose bleed- humidifcation added to O2. Complains of back pain but reports it is controlled with lidocaine patch and repositioning. Up to chair for dinner with assist of 2 and walker. Poor appetite. Bumex drip infusing at 0.75mg/hour. Wahl care completed. Plan to continue to monitor patient, record I&Os, and provide encouragement. Notify physician with changes or concerns.

## 2017-05-09 NOTE — PROGRESS NOTES
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 H/o multiple admissions with heart failure exacerbations.    #) Acute on chronic systolic heart failure  #) HFrEF  #)MVR s/p mitral clip placement   Considered infectious vs cardiac etiology, CXR appeared to be pulmonary edema. Echo with EF 30-35% with moderate diffuse hypokinesis. No WBC or fever, procal 0.58, influenza and RVP negative.   - Clinically declining and actively discussing/moving towards comfort cares, per family will hold with current cares and not escalate further  - Diuresis with Bumex gtt, will increase from 0.5 to 0.75, evidence of worsening volume status by wts and labs  - Continue home medications: lisinopril 2.5mg, metoprolol 12.5mg Xl , amiodarone 200 mg  - With decline in renal function transition from oxycodone to 1 mg Dilaudid, increase from BID PRN to QID PRN   - Strict I and O, daily weight (goal weight is 180lbs)  - K>4, Mag>2, check BID with gtt     #) Goals of care  End Stage heart failure. Ongoing discussions in past about taking more of a palliative approach. Recently discussed between outpatient Cardiology, patient's nursing home, and patient's sister. Multiple conversations with sister this hospitilaization who confirmed that would not want heroic actions, think focus on comfort reasonable.   - Made DNR/DNI on admission  - Palliative Team following, greatly appreciate recs    #) Altered mental status:  Previously on opiate meds -including morphine, oxycodone and also  previously on methadone, recently discontinued. Some, likely chronic hypercapnea from VANDA and refusal to wear CPAP/BiPAP. Negative CT Head, TSH elevated as below. Now with worsening encephalopathy potentially in setting of worsening renal failure and ongoing opioids though low dose. Was up overnight so likely element of sleep-wake disturbance. Per family not doing additional diagnostic testing such as ammonia, VBG as unlikely to alter care decisions.   - Seroquel 12.5 mg at bedtime, can give additional dose PRN     #) Dark Stool  Noted to have dark, bloody appearing stool in am 5/6. Large hard stool with decompaction day prior and possibly due to hemorrhoids, Hgb stable. Nurses encouraging stool softeners.      #) Hypothyroidism:  TSH elevated on admission at 13 with T3 1.33 (Jan 2017 TSH 12.94 with T4 1.18). Reportedly with recent adjustment of levothyroxine in setting of ongoing lethergy, unclear if actually occurred.   - Continue with levothyroxine 175 mcg daily     #) HTN:Continue with lisinopril 2.5 mg, metoprolol 12.5 mg XL     #)Venous stasis ulcer: DVT US negative, appreciate recs from Wound     #)Physical deconditioning  -PT/OT     FEN: Na and fluid restriction  Prophylaxis: DVT: enoxaparin   Disposition: Family discussions about Hospice, sister planning to arrive tomorrow  Code Status: DNR/DNI    Patient seen and discussed with Dr. Love, who agrees with above plan.    Tania Falcon MD PhD  Internal Medicine PGY-2  [phone]        Interval History:   Stable during day, well enough to get up to chair. Overnight reporting pain that this morning states is in his feet and back. Concerned because also short of breath. Discussed this morning that overall course is declining and that could switch focus to alleviation of pain and dyspnea. Patient left to consider idea. Later on with Palliative reporting speaking to sister and stated that was in agreement with hospice care. Will call sister this  afternoon.         Medications:   Reviewed. Please see EPIC for details.           Physical Exam:   Vitals were reviewed  Blood pressure 97/78, pulse 79, temperature 97.5  F (36.4  C), temperature source Oral, resp. rate 18, height 1.829 m (6'), weight 95.9 kg (211 lb 6.4 oz), SpO2 91 %.    I&O's:   Intake/Output Summary (Last 24 hours) at 05/09/17 0657  Last data filed at 05/09/17 0622   Gross per 24 hour   Intake          1549.74 ml   Output             1400 ml   Net           149.74 ml     Physical Exam:   General: Rouses with voice, lying in bed, mild distress  HEENT: No Scleral icterus. NCAT, MMM. PERRL, EOMI, lips dry, NC in place  Cardiac: Normal rate, regular rhythm, no additional murmurs or rubs  Pulm: CTAB anteriorly with crackles side bases, increased respiratory effort  Abd: Distended, firm, bowel sounds present  Skin: No jaundice, venous stasis ulcer on lower extremity, most significant left heel, numerous ecchymoses   Extremities: 1+ edema, healing stasis ulcers  Neuro: Slowed mentation and speech, answering questions appropriately, asterixis and myoclonic jerks    Labs reviewed    ATTENDING ATTESTATION:  Patient has been seen and evaluated by me on May 9, 2017. I have reviewed the medications, laboratory, imaging, and other relevant results.  I agree with the above note which I have edited, as necessary.  I have discussed my assessment and plan with the house staff.    Cathy Love MD, MS  Staff Cardiologist, [facility]  Pager: [phone]

## 2017-05-09 NOTE — PLAN OF CARE
Problem: Cardiac: Heart Failure (Adult)  Goal: Signs and Symptoms of Listed Potential Problems Will be Absent or Manageable (Cardiac: Heart Failure)  Signs and symptoms of listed potential problems will be absent or manageable by discharge/transition of care (reference Cardiac: Heart Failure (Adult) CPG).   Outcome: No Change  D:Up out of bed to chair for both meals.   Short of breath with increased activity.   Visibly breathing more rapid and deeper.  And reports feeling short of breath.  O2 sat in the low 90's on 6L/NC.  Lungs are diminished in the lower lobes bilaterally.  Left side greater than right.   Has generalized edema.  Scrotal edema is decreasing.   Dressing to left lower extremity changed as ordered.  Complains of pain in lower extremities.   Was seen by Palliative Care MD.  Pain medication was changed as recommended from oxycodone to oral dilaudid 1mg q 6hrs.  Was medicated after lunch and has not complained of further pain.  Was assisted to commode with assist of two prior to getting back to bed and had a soft, green formed stool.  Has declined laxatives.   Wahl with adequate urine output.  Cr 1.85 from 1.65 yesterday.  Bumex drip increased from .5mg/hr to .75mg/hr as ordered.  Patient's primary team and Palliative care spoke with patient regarding hospice care.  Patient's sister will be here tomorrow from [place] and patient will make a decision with her support.       A:Seems down.  Flat affect.  Alert and oriented.  Is coherent and conversation is normal and appropriate to subject discussed.   Mild to moderate pain in lower extremities.  Tolerates getting up to chair  With assist of two.  Mild increase in shortness of breath.  No acute distress.  Poor nutritional intake.   Rhythm is unchanged and stable.   Afebrile and VSS.  Condition is stable.     P:Assess mental status.   Assess level of comfort.   Medicate prn pain.  OOB to chair for meals with assist of two.   Turn q2hrs when in bed.   Eleveate lower extremities.  Monitor rhythm, temp and vital signs and O2 sats.  Bumex drip as ordered.  Monitor and record output.

## 2017-05-09 NOTE — PLAN OF CARE
Problem: Goal Outcome Summary  Goal: Goal Outcome Summary  OT: cancel, awaiting pt to make decision regarding possible transition to hospice. Pt has been getting up with RN staff throughout day and maintaining strength and transfer skills in this manner. Will address potential hospice needs once definitive plan established.

## 2017-05-09 NOTE — PLAN OF CARE
Problem: Goal Outcome Summary  Goal: Goal Outcome Summary  Outcome: No Change  D: CHF exacerbation.  I/A: VSS. Sinus rhythm. C/o pain everywhere and constantly requesting pain meds. Repeated use of call light and requests for going to bed, getting out of bed, cleaning dentures, food, drink, etc. Bumex drip continues at 0.5mg/hr and Wahl with limited urine output. O2 via nasal cannula.  P: Continue to monitor.

## 2017-05-09 NOTE — TELEPHONE ENCOUNTER
1.  Date/reason for appt: 5/17/17 4:20PM GERD   2.  Referring provider: Nunu SANTANA   3.  Call to patient (Yes / No - short description): No, pt was referred.  4.  Previous care at / records requested from:  [facility] - 4/22/16  [facility] - 12/23/16 & 11/3/16  [facility] - 1/24/17  Images in PACS

## 2017-05-09 NOTE — PROGRESS NOTES
CLINICAL NUTRITION SERVICES     Received an admission nutrition risk screen for stageable pressure injuries or large/non-healing wound or burn. Per WOC nurse note 5/5, pt with stage III pressure injury (PI) on his L heel and L achillis.     Per chart, pt clinically declining and actively discussing/moving toward comfort cares, per family will hold with current cares and not further escalate.    INTERVENTIONS  Implementation  Collaboration with other providers: Discussed wound with team and if wound protocol would be appropriate. At this time, wound protocol will not be ordered due to goals of care.     Follow Up / Monitoring:   Consult nutrition if plan of care changes and if wound protocol is desired.     Kely Rice, MS, RD, LD, [facility]   6C Pgr:  [phone]

## 2017-05-09 NOTE — PROVIDER NOTIFICATION
Time: 1900  Notified: Dr. Carmona ([id number])  Reason: Vancomycin level=36.5.   MD ordered: MD aware no new orders at this time.

## 2017-05-09 NOTE — PLAN OF CARE
Problem: Goal Outcome Summary  Goal: Goal Outcome Summary  Outcome: No Change     D. Pt is stable. ALOx4.  On 6NC.  NERI.  Sleeping between cares and appeared to be tired. Pt was OOB and up in chair for about 4 his this evening.  Diuresing pt with IV Bumex.  Adequate UO via Wahl. BID BMP changed to QDaily.  Pt us up with ceiling lift.   I. Strict I and O's.    A. Pt is stable.   P.Continuie to monitor.

## 2017-05-10 NOTE — PHARMACY-VANCOMYCIN DOSING SERVICE
Pharmacy Vancomycin Note  Date of Service May 10, 2017  Patient's  1950   66 year old, male    Indication: Healthcare-Associated Pneumonia  Goal Trough Level: 15-20 mg/L  Day of Therapy: 6  Current Vancomycin regimen:  1750 mg IV q12h, but put on hold on  as level was 46.4    Current estimated CrCl = Estimated Creatinine Clearance: 48.1 mL/min (based on Cr of 1.87).    Creatinine for last 3 days  2017:  6:50 AM Creatinine 1.65 mg/dL  2017:  7:40 AM Creatinine 1.85 mg/dL  5/10/2017:  7:31 AM Creatinine 1.87 mg/dL    Recent Vancomycin Levels (past 3 days)  2017:  7:48 PM Vancomycin Level 46.4 mg/L  2017:  4:38 PM Vancomycin Level 36.5 mg/L  5/10/2017: 11:45 AM Vancomycin Level 25.5 mg/L    Vancomycin IV Administrations (past 72 hours)      No vancomycin orders with administrations in past 72 hours.                Nephrotoxins and other renal medications (Future)    Start     Dose/Rate Route Frequency Ordered Stop    17 2145  vancomycin (VANCOCIN) 1,750 mg in NaCl 0.9 % 500 mL intermittent infusion      1,750 mg Intravenous HOLD 17 2131      17 1130  bumetanide (BUMEX) 0.25 mg/mL infusion      0.75 mg/hr  3 mL/hr  Intravenous CONTINUOUS 17 1126      17 0800  lisinopril (PRINIVIL/Zestril) tablet 2.5 mg      2.5 mg Oral DAILY 17 0411      17 0500  piperacillin-tazobactam (ZOSYN) 4.5 g vial to attach to  mL bag      4.5 g  over 1 Hours Intravenous EVERY 6 HOURS 17 0432               Contrast Orders - past 72 hours     None          Interpretation of levels and current regimen:  Trough level is  Supratherapeutic    Has serum creatinine changed > 50% in last 72 hours: No    Urine output:  diminished urine output    Renal Function: Stable    Plan:  1.  Continue to hold vanco.  Will check level at noon on  at which time should be in goal range.    Haja Bone        .

## 2017-05-10 NOTE — PLAN OF CARE
Problem: Goal Outcome Summary  Goal: Goal Outcome Summary  Outcome: No Change  Pt A/Ox4, VSS on 6L NC with humidification. Pt has miller in for 1-2 hour monitoring. Pt has Bumex gtt running at 0.75mg/hr. Dilaudid given for back pain. Pt had multiple BM's yesterday evening. Pt up with assist 1-2. Pt repositioned Q2h overnight. Pt impulsive, set off bed alarm multiple times overnight. Pt weak and unsteady on feet. ABX given. Pt awake most of night, slept 2-3 hours. Continue to monitor and notify MD of questions or concerns.

## 2017-05-10 NOTE — CONSULTS
Social Work: Assessment with Discharge Plan    Patient Name:  Scott Hebert  :  1950  Age:  66 year old  MRN:  [id number]  Risk/Complexity Score:  Filed Complexity Screen Score: 12  Completed assessment with:  Cards 1 team, SNF admissions, pt not able to participate, sister coming in to hospital this afternoon, will gather more information from her to update the assessment.    Presenting Information   Reason for Referral:  Discharge plan  Date of Intake:  May 10, 2017  Referral Source:  Chart Review  Decision Maker:  Pt when able  Alternate Decision Maker:  Sister Mauricio is alternate decision maker.  Health Care Directive:  Health Care Directive Agent (if patient not able to make decisions) - from previous admissions is sister Mauricio.  Living Situation:  TCU - pt has been living at multiple [facility] and has not been able to return home.  Previous Functional Status:  Assistance with Other:  all ADLs.  SNF admissions reports pt had not been progressing with therapies since his admission.  Patient and family understanding of hospitalization:  Pt unable to participate in conversations.  Will attempt to reassess this when sister arrives.  Cultural/Language/Spiritual Considerations:   available at pt's request.  Adjustment to Illness:  Pt unable to participate in cares.  Sister will be arriving this afternoon to discuss further medical care/treatment.    Physical Health  Reason for Admission:    1. Acute on chronic combined systolic and diastolic congestive heart failure (H)    2. 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 Localized swelling of both lower legs    4. Chronic kidney disease, stage III (moderate)    5. Type 2 diabetes mellitus with diabetic chronic kidney disease, unspecified CKD stage, unspecified long term insulin use status (H)    6. Personal history of tobacco use, presenting hazards to health      Services Needed/Recommended:  TCU - SNF admissions states they will  transition pt to palliative floor if pt or sister chooses comfort cares today.    Mental Health/Chemical Dependency  Diagnosis:  NA  Support/Services in Place:  NA  Services Needed/Recommended:  NA    Support System  Significant relationship at present time:  Sister Mauricio lives out of state but will be flying in today to further discuss discharge planning.  Family of origin is available for support:  Yes  Other support available:  Pt has support for ADLs through TCU.  Pt has a bed hold and can return when ready.  Gaps in support system:  None reported.  Patient is caregiver to:  None     Provider Information   Primary Care Physician:  Scott Bryan   [phone]   Clinic:  [facility] [address] [facility] [address]      :  JASON    Financial   Income Source:  Pt not currently working due to illness.  Financial Concerns:  Will assess with sister.  Insurance:    Payor/Plan Subscriber Name Rel Member # Group #   MEDICA - MEDICA APPLA* SCOTT BENSON  [id number]      [address]       Discharge Plan   Patient and family discharge goal:  TCU - sister mentioned to Cards 1 over the phone that the family feels pt is getting good care at [facility] and would return here.  Provided education on discharge plan:  YES  Patient agreeable to discharge plan:  Unable to assess.  Will continue to try to assess this.  A list of Medicare Certified Facilities was provided to the patient and/or family to encourage patient choice. Patient's choices for facility are:    [facility]  PH: [phone]  F: [phone]    Will NH provide Skilled rehabilitation or complex medical:  YES  General information regarding anticipated insurance coverage and possible out of pocket cost was discussed. Patient and patient's family are aware patient may incur the cost of transportation to the facility, pending insurance payment: YES  Barriers to discharge:  Medical stability    Discharge  Recommendations   Anticipated Disposition:  Facility:  TCU  Transportation Needs:  Medical:  Other:  TBD pending medical safety  Name of Transportation Company and Phone:  [facility] [employer]    Additional comments   SW has attempted to meet with pt and pt was unable to participate in full assessment.  Pt's sister will be arriving this afternoon to meet with medical team to discuss pursuing restorative treatments or pursuing comfort cares.  Per nursing assessment today, pt appears to have declined medically since yesterday.  Palliative team also involved for recommendations on care.  SW has called to hospice residences for bed availability should family choose comfort cares.  [facility] has also offered to move the pt to a palliative floor if he or the family chooses comfort cares at discharge.  SW will discuss with family options for hospice providers.  [facility] admissions states they normally work with [facility]'s hospice agency.  Will gather choice from sister today.  SW to continue following for discharge planning/needs.    MARIMAR Gates, ROSETTAW  [address] Unit   Phone: [phone]  Pager: [phone]  Unit: [phone]

## 2017-05-10 NOTE — PLAN OF CARE
"Problem: Cardiac: Heart Failure (Adult)  Goal: Signs and Symptoms of Listed Potential Problems Will be Absent or Manageable (Cardiac: Heart Failure)  Signs and symptoms of listed potential problems will be absent or manageable by discharge/transition of care (reference Cardiac: Heart Failure (Adult) CPG).   Outcome: Declining  D:Patient not responding to verbal stimulation.   And was barely opening eyes.   Using accessory muscles for breathing and mouth breathing.   All a.m medications and any po intake were held.  Patient's primary team was informed of change in mental status.  Patient is DNR/DNI.   Was turned q 2 hours with oral care.  No signs of pain,  Except with lifting left lower extremity.  At which time pain would cry out in pain.  Continue on a bumex drip as ordered.  Urine output adequate (see doc flows for details).   Rhythm is NSR with no ectopy.  HR 82, Bp 126/67 MAP 86.   Patient suddenly became more alert around 1400.   Was talking and asking to eat.  Patient was able to take all a.m  meds at this time without any difficulty.  Asked for pain medication for low back pain and left lower extremity pain.   Was given .5mg of oral dilaudid.  Up to bedside commode and is currently sitting up in chair.       A:Very out  Of it this morning and not responding, except to deep pain stimulation.   Perked up this afternoon.   Confused about surroundings.   Stated he was \"at home\" and was reoriented.  Rhythm is unchanged.   Afebrile.  VSS.   O2 sat is good on 6L/NC.       P:Continue to assess mental status.  Monitor respiratory status.  Monitor O2 sats.  Monitor rhythm and vital signs.  Bed alarm on when in bed.        "

## 2017-05-10 NOTE — PROGRESS NOTES
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 H/o multiple admissions with heart failure exacerbations.    #) Acute on chronic systolic heart failure  #) HFrEF  #)MVR s/p mitral clip placement   Considered infectious vs cardiac etiology, CXR appeared to be pulmonary edema. Echo with EF 30-35% with moderate diffuse hypokinesis. No WBC or fever, procal 0.58, influenza and RVP negative.   - Clinically declining and actively discussing/moving towards comfort cares, per family will hold with current cares and not escalate further  - Diuresis with Bumex gtt, now at  0.75  - Continue home medications: lisinopril 2.5mg, metoprolol 12.5mg Xl , amiodarone 200 mg  - With decline in renal function transition from oxycodone to 1 mg Dilaudid, increase from BID PRN to QID PRN   - Strict I and O, daily weight (goal weight is 180lbs)  - K>4, Mag>2, check BID with gtt     #) Goals of care  End Stage heart failure. Ongoing discussions in past about taking more of a palliative approach. Recently discussed between outpatient Cardiology, patient's nursing home, and patient's sister. Multiple conversations with sister this hospitilaization who confirmed that would not want heroic actions, think focus on comfort reasonable.   - Made DNR/DNI on admission  - Palliative Team following, greatly appreciate recs  - Sister arrived today (5/10) and will be talking to patient about his wishes. Discussed that in next day could transition to comfort cares when ready  - Looking into  facility hospice if appears stable enough to discharge    #) Altered mental status:  Previously on opiate meds -including morphine, oxycodone and also previously on methadone, recently discontinued. Some, likely chronic hypercapnea from VANDA and refusal to wear CPAP/BiPAP. Negative CT Head, TSH elevated as below. Now with worsening encephalopathy potentially in setting of worsening renal failure and ongoing opioids though low dose. Was up overnight so likely element of sleep-wake disturbance. Per family not doing additional diagnostic testing such as ammonia, VBG as unlikely to alter care decisions.   - Seroquel 12.5 mg at bedtime, can give additional dose PRN     #) Dark Stool  Noted to have dark, bloody appearing stool in am 5/6. Large hard stool with decompaction day prior and possibly due to hemorrhoids, Hgb stable. Nurses encouraging stool softeners.      #) Hypothyroidism:  TSH elevated on admission at 13 with T3 1.33 (Jan 2017 TSH 12.94 with T4 1.18). Reportedly with recent adjustment of levothyroxine in setting of ongoing lethergy, unclear if actually occurred.   - Continue with levothyroxine 175 mcg daily     #) HTN:Continue with lisinopril 2.5 mg, metoprolol 12.5 mg XL     #)Venous stasis ulcer: DVT US negative, appreciate recs from Wound     #)Physical deconditioning  -PT/OT     FEN: Na and fluid restriction  Prophylaxis: DVT: enoxaparin   Disposition: Family discussions about Hospice, sister planning to arrive tomorrow  Code Status: DNR/DNI    Patient seen and discussed with Dr. Love, who agrees with above plan.    Tania Falcon MD PhD  Internal Medicine PGY-2  [phone]        Interval History:   No acute events overnight. Less conversant today. Sister arrived in afternoon. Updated on clinical status. She is hoping for patient to have more lucid period this evening where can discuss his wishes. Discussed comfort cares and what would entail, that would watch clinical status and if appeared stable  could attempt discharge to hospice.          Medications:   Reviewed. Please see EPIC for details.           Physical Exam:   Vitals were reviewed  Blood pressure 109/69, pulse 79, temperature 97.6  F (36.4  C), temperature source Axillary, resp. rate 18, height 1.829 m (6'), weight 96.5 kg (212 lb 12.8 oz), SpO2 95 %.    I&O's:   Intake/Output Summary (Last 24 hours) at 05/09/17 0657  Last data filed at 05/09/17 0622   Gross per 24 hour   Intake          1549.74 ml   Output             1400 ml   Net           149.74 ml     Physical Exam:   General: Rouses with voice, lying in bed, mild distress  HEENT: No Scleral icterus. NCAT, MMM. PERRL, EOMI, lips dry, NC in place  Cardiac: Normal rate, regular rhythm, no additional murmurs or rubs  Pulm: CTAB anteriorly with crackles side bases, increased respiratory effort  Abd: Distended, firm, bowel sounds present  Skin: No jaundice, venous stasis ulcer on lower extremity, most significant left heel, numerous ecchymoses   Extremities: 1+ edema, healing stasis ulcers  Neuro: Slowed mentation and speech, answering questions appropriately, asterixis and myoclonic jerks    Labs reviewed    ATTENDING ATTESTATION:  Patient has been seen and evaluated by me on May 10, 2017. I have reviewed the medications, laboratory, imaging, and other relevant results.  I agree with the above note which I have edited, as necessary.  I have discussed my assessment and plan with the house staff.    Cathy Love MD, MS  Staff Cardiologist, [facility]  Pager: [phone]

## 2017-05-10 NOTE — PROGRESS NOTES
"[facility], [place]   Palliative Care Daily Progress Note         Recommendations & Counseling     Scott Hebert is a 66 year old male with history of ICM, mitral valve regurgitation s/p clip, VANDA, and CKD who was admitted with acute decompensated heart failure with hypervolemia and increased shortness of breath. This is his 7th hospitalization in the past year, the cardiology team is concerned he will continue to require hospitalizations with his poor heart function. Palliative consult was placed to address goals of care given worsening heart function.    #Goals of Care: Cardiology team has been having ongoing discussions with Scott's sister Mauricio (POA) and him over the past few days. During our conversation today, Scott states he believes a hospice approach would be best for him. He understands what hospice care entails and stated, \"I know I will go to heaven\". This is certainly appropriate, and it appears his sister is in agreement as well, however she understandably wants to wait to make such a decision until she has seen Jamaal. She is flying into town today, will touch [facility] tomorrow. Currently, the plan is to not escalate cares (continuing with diuresis and labs per cardiology team) and we will reassess once Mauricio has arrived. If his status was to decline, cardiology team would focus on comfort per discussion with Mauricio. Anticipate Scott will leave the hospital with hospice.  -Agree with no escalation of cares  -Will meet with Mauricio once she arrives--likely tomorrow    #Symptom management  #Insomnia  #Pain: Lower extremity and back pain have been the most concerning symptoms for Scott. There has been hesitation to provide too much medication given his intermittent somnolence. Given he was more alert 5/9 we decided to liberalize his pain medication regimen and started dilaudid 1 mg q6h prn. This certainly helped with his pain, however it also could be contributing to him being sleepy so we " will decrease to 0.5-1.0 mg. Was previously on high dose of methadone, would not restart this now but may be something to consider over time with assessment with the hospice team. Additionally, given the fact he has been awake throughout the night and more drowsy during the day--and that he reports hx insomnia at home-- would recommend low dose seroquel 12.5 mg at bedtime with additional 12.5 mg prn if needed. As he is stooling appropriately and has been without receiving lactulose, would recommend discontinuing this; would continue senna. If it is thought his mental status could be related to hepatic encephalopathy could consider continuing lactulose.  -will make dilaudid a range 0.5 - 1.0 mg q6h as he feels sleepy with the 1.0 mg would start with 0.5 mg and gauge response  -seroquel 12.5 mg qhs plus prn dose  -continue senna, recommend discontinuing lactulose    Patient seen and discussed with Dr. Marcela Leavitt.    Magdy Haynes  Internal Medicine PGY1    Patient seen and evaluated with Dr. Haynes.   Agree with assessment and recommendations.    Total time spent was 30 minutes,  >50% of time was spent counseling and/or coordination of care regarding goals, symptoms.    Lazara Leavitt  Palliative Care   Pager [phone]      Inter-disciplinary (nurses, physicians, clinical , chaplains) plan of care is discussed daily Monday-Friday in palliative care team rounds for all our patients.           Disease Process/es & Symptoms     ICM with acute decompensation  JESUS on CKD  Insomnia  Lower extremity pain  VANDA          Interval History:   Continued to be awake overnight only sleeping for a few hours. This morning, very sleepy and difficult to arouse. Pain is better this morning, relates this to the dilaudid although also notes it is likely making him sleepy. Mauricio is flying into town this afternoon.           Medications:   Current Facility-Administered Medications   Medication     HYDROmorphone  (DILAUDID high concentration) (HIGH CONC) solution 0.5-1 mg     QUEtiapine (SEROquel) half-tab 12.5 mg     vancomycin (VANCOCIN) 1,750 mg in NaCl 0.9 % 500 mL intermittent infusion     bumetanide (BUMEX) 0.25 mg/mL infusion     acetaminophen (TYLENOL) tablet 1,000 mg     albuterol (PROAIR HFA/PROVENTIL HFA/VENTOLIN HFA) Inhaler 2 puff     allopurinol (ZYLOPRIM) tablet 300 mg     amiodarone (PACERONE/CODARONE) tablet 200 mg     aspirin chewable tablet 81 mg     atorvastatin (LIPITOR) tablet 20 mg     bisacodyl (DULCOLAX) Suppository 10 mg     calcium carb 1250 mg (500 mg [place])/vitamin D 200 units (OSCAL with D) per tablet 1 tablet     ferrous gluconate (FERGON) tablet 324 mg     gabapentin (NEURONTIN) capsule 200 mg     guaiFENesin (MUCINEX) 12 hr tablet 600 mg     ipratropium - albuterol 0.5 mg/2.5 mg/3 mL (DUONEB) neb solution 3 mL     levothyroxine (SYNTHROID/LEVOTHROID) tablet 175 mcg     lidocaine (LIDODERM) 5 % Patch 1 patch     lisinopril (PRINIVIL/Zestril) tablet 2.5 mg     magnesium oxide (MAG-OX) tablet 400 mg     Melatonin tablet 10 mg     metoprolol (TOPROL-XL) half-tab 12.5 mg     fluticasone-vilanterol (BREO ELLIPTA) 200-25 MCG/INH oral inhaler 1 puff     multivitamin, therapeutic with minerals (THERA-VIT-M) tablet 1 tablet     pantoprazole (PROTONIX) EC tablet 40 mg     polyethylene glycol (MIRALAX/GLYCOLAX) Packet 17 g     potassium chloride SA (K-DUR/KLOR-CON M) CR tablet 20 mEq     senna-docusate (SENOKOT-S;PERICOLACE) 8.6-50 MG per tablet 2 tablet     sodium chloride (OCEAN) 0.65 % nasal spray 2 spray     umeclidinium (INCRUSE ELLIPTA) 62.5 MCG/INH oral inhaler 1 puff     naloxone (NARCAN) injection 0.1-0.4 mg     lidocaine 1 % 1 mL     lidocaine (LMX4) kit     sodium chloride (PF) 0.9% PF flush 3 mL     sodium chloride (PF) 0.9% PF flush 3 mL     enoxaparin (LOVENOX) injection 40 mg     potassium chloride SA (K-DUR/KLOR-CON M) CR tablet 20-40 mEq     potassium chloride (KLOR-CON) Packet 20-40 mEq      potassium chloride 10 mEq in 100 mL intermittent infusion     potassium chloride 10 mEq in 100 mL intermittent infusion with 10 mg lidocaine     potassium chloride 20 mEq in 50 mL intermittent infusion     magnesium sulfate 2 g in NS intermittent infusion (PharMEDium or FV Cmpd)     magnesium sulfate 4 g in 100 mL sterile water (premade)     polyethylene glycol (MIRALAX/GLYCOLAX) Packet 17 g     ondansetron (ZOFRAN-ODT) ODT tab 4 mg    Or     ondansetron (ZOFRAN) injection 4 mg     No lozenges or gum should be given while patient on BIPAP     hypromellose-dextran (ARTIFICAL TEARS) ophthalmic solution 1 drop     lactulose (CHRONULAC) solution 20 g     lidocaine (LIDODERM) patch REMOVAL     lidocaine (LIDODERM) Patch in Place     piperacillin-tazobactam (ZOSYN) 4.5 g vial to attach to  mL bag             Review of Systems:   Negative other than noted above.     Vital signs:   Heart Rate: 83, Blood pressure 111/60, pulse 79, temperature 97.7  F (36.5  C), temperature source Axillary, resp. rate 20, height 1.829 m (6'), weight 96.5 kg (212 lb 12.8 oz), SpO2 95 %.  Estimated body mass index is 28.86 kg/(m^2) as calculated from the following:    Height as of this encounter: 1.829 m (6').    Weight as of this encounter: 96.5 kg (212 lb 12.8 oz).  General: sleeping in bed, difficult to arouse but does respond appropriately to questions  Eyes: sclera clear  HEENT: mucus membranes dry  Lungs: faint crackles in inferior fields   Heart: normal rate, regular rhythm, II/VI systolic ejection murmur, no rub/gallop appreciated  Abdomen: distended - similar, positive fluid wave  Ext: trace LE edema, chronic wounds over bilateral LE  Neuro: sleepy this morning          Data Reviewed:   Laboratory and imaging data reviewed in EPIC

## 2017-05-10 NOTE — PLAN OF CARE
Problem: Goal Outcome Summary  Goal: Goal Outcome Summary  OT: holding, awaiting potential DISCH to hospice, will continues PRN for safe DISCH.

## 2017-05-11 NOTE — PLAN OF CARE
Problem: Goal Outcome Summary  Goal: Goal Outcome Summary  Outcome: No Change  D/I: Pt with HF continues on a bumex drip with good u/o per FC. Up and down to chair and commode several times. Assist of 2 and gait belt to transfer. Medicated for pain with dilaudid elixir, dose increased per MD per family request as pt seems restless and has some leg and foot pain. L heel and ankle dressings changed. MD and SW spoke to pts sister today, he may be made comfort care tomorrow. Frequent cough, at times productive. Lungs coarse, O2 increased for SOB to 8Lper nc with sats of 94%. Refusing mask, mouth breather.   P: Monitor and assist as needed.

## 2017-05-11 NOTE — PLAN OF CARE
Problem: Goal Outcome Summary  Goal: Goal Outcome Summary     DIAP:  Pt admitted 5/4/17 from TCU w/ SOB, cough and lethargy.  History of HF EF 35%, MVR 10/2016, a fib, CKD, VANDA, CAD.  Bumex gtt continues at 0.75 mg/hr (3 mL/hr).  Adequate UOP via miller, patent.  Pt up in chair for most of evening, shifted wt as tolerated, bed for second half of night.  Assist-2 w/ gait belt and walker.  VSS on 6L NC, pt refused oxymask.  SR.  Generalized/leg pain relieved w/ PRN dilaudid elixir.  L heel and ankle drsgs CDI.  Pt likely transitioning to comfort cares today per pt's sister.  Pt's sister refused wt.  Continue w/ plan of care and notify team w/ changes/concerns.

## 2017-05-11 NOTE — PROGRESS NOTES
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    Placed on comfort cares shortly after 8 am with cessation of all heart failure medications and initiation of IV opioids. Around 11:20 am called to patient's room by the Charge RN due to perform death exam. Patient death occurred at 11:15 am on 5/11/2017.     Patient did not respond to verbal or pain stimuli. Pupils noted to be fixed, no corneal reflex present. No heart or lungs sounds heard. No pulse present.     Patient's family present at the bedside at time of death. Family offered autopsy exam and have declined.    Tania Falcon MD PhD  Internal Medicine PGY-2  Pager [phone]

## 2017-05-11 NOTE — PLAN OF CARE
"Problem: Cardiac: Heart Failure (Adult)  Goal: Signs and Symptoms of Listed Potential Problems Will be Absent or Manageable (Cardiac: Heart Failure)  Signs and symptoms of listed potential problems will be absent or manageable by discharge/transition of care (reference Cardiac: Heart Failure (Adult) CPG).   Outcome: Declining  D:Patient somnolent.  Grimaces or moans with changing position.   Medicated with IV dilaudid 0.5mg this morning.   SisterMauricio  Spoke with cardiology resident Tania Falcon regarding patient's wishes.   Mauricio expressed to MD that she and her brother decidied to \"remove everything.  Including the oxygen\".   Patient's monitor and bumex drip was discontinued.  Also dilaudid IV was discontinued.  Patient was started on morphine 1-2mg IV.  Patient had received 1mg and within 10-15 minutes  Sister requested another 1mg of IV morphine.   Patient was turned q2 hours and oral care.   Within two hours,  Patient had .  Sister was a bedside and present at the time of death.  MD was notified and came to do the pronoucemant of death.  Patient exprired at 11:15 am.  After sister said her good bye's.   IV's and miller removed.   Body was cleaned and tagged.  [facility]       A:Patient  peacefully with sister Mauricio at bedside.       P:To morturary provided by sisterMauricio.      "

## 2017-05-11 NOTE — PROGRESS NOTES
"Social Work Services Discharge Note      Patient Name:  Scott Hebert     Anticipated Discharge Date:  17    Discharge Disposition:   Other:  Discharged as pt is .    Following MD:  JASON     Pre-Admission Screening (PAS) online form has been completed.  The Level of Care (LOC) is:  Determined  Confirmation Code is:  JASON  Patient/caregiver informed of referral to [facility] for Pre-Admission Screening for skilled nursing facility (SNF) placement and to expect a phone call post discharge from SNF.     Additional Services/Equipment Arranged:  JASON     Patient / Family response to discharge plan:  SW met with sister Mauricio who was a bedside at time of death.   is grieving appropriately and has no questions for SW at this time.  SW was given pt's preferred  home.   states the change was \"fast\" and feels that \"pt is in a better place\".   requests being the primary contact for any  planning.  SW provided support and will be available for other needs supports at this time.       Persons notified of above discharge plan:  , charge RN, bedside RN, RNCC, Cards 1 team, SNF admissions where pt has a bed hold.  Charge RN to follow policy for discharge due to death.    Staff Discharge Instructions:  Please fax discharge orders and signed hard scripts for any controlled substances.  Please print a packet and send with patient.     CTS Handoff completed:  YES    Medicare Notice of Rights provided to the patient/family:  MARIMAR Lassiter, APSW  6C Unit   Phone: [phone]  Pager: [phone]  Unit: [phone]            "

## 2017-05-12 NOTE — DISCHARGE SUMMARY
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 H/o multiple admissions with heart failure exacerbations.          Discharge Diagnosis:   Acute on Chronic Systolic Heart Failure   MVR s/p mitral clip placement   Encephalopathy  Hypertension  Venous Stasis Ulcers  Hypothyroidism         Procedures & Significant Findings:   CXR 5/4/2017  Impression:   1. Perihilar and retrocardiac opacities, are increased from 1/23/2017,  likely pulmonary edema.  2. Elevated left hemidiaphragm with prominent large bowel loop in the  left upper quadrant.  3. Cardiomegaly.    US LE DVT 5/5/2017  IMPRESSION:  No evidence of deep venous thrombosis in either lower extremity.    CT Head 5/5/2017  Impression:   1. No acute intracranial pathology.  2. Vascular coils, as above, somewhat limiting visualization  3. Moderate cerebral volume loss and leukoaraiosis, increased from  exam in 2010.    XR Abdomen 5/5/2017  Impression:   1. Nonspecific dilated large and small bowel throughout the abdomen,  is most consistent with adynamic ileus.  2. Opacification of the left lung base, may represent pleural effusion  with overlying  consolidation/atelectasis.         Consultations:   Palliative         Hospital Course:    Admitted from nursing home with lethargy, weight gain, hypoxia, found to have pulmonary edema and other evidence of decompensated heart failure. Started on diuresis with intermittent Lasix that transitioned to Bumex drip for greater effect. Continued on all home heart failure medications. Due to patient's continued decline from numerous chronic illnesses with repeated hospital admissions, patient, patient's sister who is his POA, and outpatient providers had started to discuss idea of palliative approach to cares. Discussed with patient idea of focusing on alleviation of pain and dyspnea rather than cure and started on low dose of oral opioids for air hunger. Discussions hampered by progressing encephalopathy. Discussed with patient's sister who as spokesperson for multiple siblings stated that family did not want heroic efforts and that would likely pursue hospice care. During period of increased alertness patient was able to convey that just wanted to be comfortable and wanted to enroll in hospice. On day prior to death patient's sister arrived from out of state and they were able to have conversations about patient's wishes for care. On morning of death transitioned to comfort cares with initiation of IV opioids and withdrawal of all other medications. Patient  shortly after, autopsy offered to sister and she declined.    Physical Exam on day of death:  General: No longer arousing to voice, Cheyne Olmstead breathing  HEENT: Eyes closed. Lips dry  Cardiac: asystolic  Pulm: no breath sounds  Abd: Distended, firm, bowel sounds absent  Skin: Poor color, venous stasis ulcer on lower extremity, most significant left heel, numerous ecchymoses   Extremities: 1+ edema, healing stasis ulcers  Neuro: No longer interacting, asterixis and myoclonic jerks         Pending Results:     Unresulted Labs Ordered in the Past 30 Days of this  Admission     No orders found from 3/5/2017 to 5/5/2017.             Discharge Medications:     Discharge Medication List as of 5/11/2017  2:09 PM      CONTINUE these medications which have NOT CHANGED    Details   torsemide (DEMADEX) 20 MG tablet Take 3 tablets (60 mg) by mouth 2 times daily Do not hold for systolic blood pressure, only hold for mean arterial pressure (MAP) < 65.   MAP = [(2x diastolic) + systolic] / 3, Historical      potassium chloride SA (K-DUR/KLOR-CON M) 20 MEQ CR tablet Take 1 tablet (20 mEq) by mouth 2 times daily, Disp-90 tablet, Historical      lisinopril (PRINIVIL/ZESTRIL) 2.5 MG tablet Take 1 tablet (2.5 mg) by mouth daily, Disp-30 tablet, Historical      metoprolol (TOPROL-XL) 25 MG 24 hr tablet Take 0.5 tablets (12.5 mg) by mouth daily Do not hold for systolic blood pressure, only hold for mean arterial pressure (MAP) < 65 or HR <60.   MAP = [(2x diastolic) + systolic] / 3, Disp-30 tablet, R-3, Historical      OXYCODONE HCL PO 2.5 mg 2 times daily as needed, Historical      ipratropium - albuterol 0.5 mg/2.5 mg/3 mL (DUONEB) 0.5-2.5 (3) MG/3ML neb solution Take 1 vial (3 mLs) by nebulization every 6 hours as needed for shortness of breath / dyspnea or other (increasing secretions), Disp-1 Box, R-1, Local Print      morphine 0.1% in solosite topical gel Place onto the skin 4 times daily as needed Apply 1 gm topically to LLE for pain four times daily as needed, Historical      Melatonin 10 MG TABS tablet Take 1 tablet (10 mg) by mouth At Bedtime, Transitional      acetaminophen (TYLENOL) 500 MG tablet Take 2 tablets (1,000 mg) by mouth every 6 hours as needed for mild pain or fever, Transitional      aspirin 81 MG chewable tablet Take 1 tablet (81 mg) by mouth daily, Disp-36 tablet, Transitional      magnesium oxide (MAG-OX) 400 MG tablet Take 1 tablet (400 mg) by mouth daily, Disp-30 tablet, R-3, Transitional      amiodarone (PACERONE/CODARONE) 200 MG tablet Take 1 tablet (200 mg) by  mouth daily, Disp-60 tablet, R-3, Transitional      albuterol (PROAIR HFA/PROVENTIL HFA/VENTOLIN HFA) 108 (90 BASE) MCG/ACT Inhaler Inhale 2 puffs into the lungs every 6 hours as needed for shortness of breath / dyspnea or wheezing, Disp-1 Inhaler, R-0, Transitional      mometasone-formoterol (DULERA) 200-5 MCG/ACT oral inhaler Inhale 2 puffs into the lungs 2 times daily, Transitional      tiotropium (SPIRIVA) 18 MCG capsule Inhale 1 capsule (18 mcg) into the lungs daily, Disp-30 capsule, Transitional      gabapentin (NEURONTIN) 100 MG capsule Take 2 capsules (200 mg) by mouth At Bedtime, Disp-90 capsule, R-3, Transitional      atorvastatin (LIPITOR) 20 MG tablet Take 1 tablet (20 mg) by mouth daily, Disp-30 tablet, R-3, Transitional      guaiFENesin (MUCINEX) 600 MG 12 hr tablet Take 1 tablet (600 mg) by mouth 2 times daily, Disp-28 tablet, R-3, Transitional      sodium chloride (OCEAN) 0.65 % nasal spray Spray 2 sprays into both nostrils every hour as needed for congestion, Disp-1 Bottle, R-0, Transitional      lidocaine (LIDODERM) 5 % Patch Place 1 patch onto the skin every 24 hours To chest for for chest pain.Disp-30 patchTransitional      allopurinol (ZYLOPRIM) 300 MG tablet Take 1 tablet (300 mg) by mouth daily, Disp-30 tablet, R-3, Transitional      ferrous gluconate (FERGON) 324 (38 FE) MG tablet Take 1 tablet (324 mg) by mouth 2 times daily, Disp-30 tablet, R-3, Transitional      bisacodyl (DULCOLAX) 5 MG EC tablet Take 1 tablet (5 mg) by mouth daily, Disp-60 tablet, Transitional      bisacodyl (DULCOLAX) 10 MG Suppository Place 1 suppository (10 mg) rectally daily as needed for constipation, Disp-30 suppository, Transitional      polyethylene glycol (MIRALAX) powder Take 17 g by mouth 2 times daily, Disp-119 g, Transitional      senna-docusate (SENOKOT-S;PERICOLACE) 8.6-50 MG per tablet Take 2 tablets by mouth 2 times daily Hold for diarrhea., Disp-100 tablet, Transitional      calcium carb 1250 mg, 500 mg  Iliamna,/vitamin D 200 units (OSCAL WITH D) 500-200 MG-UNIT per tablet Take 1 tablet by mouth 2 times daily (with meals), Disp-60 tablet, R-3, Transitional      multivitamin, therapeutic with minerals (MULTI-VITAMIN) TABS tablet Take 1 tablet by mouth daily, Disp-30 each, R-0, Transitional      levothyroxine (SYNTHROID/LEVOTHROID) 175 MCG tablet Take 1 tablet (175 mcg) by mouth every morning (before breakfast), Disp-30 tablet, Transitional      pantoprazole (PROTONIX) 40 MG EC tablet Take 1 tablet (40 mg) by mouth every morning, Disp-30 tablet, Transitional      !! order for DME Equipment being ordered: Oxygen 2-3L/minDisp-1 Can, R-0, Local Print      !! order for DME Equipment being ordered: Xerofoam gauze 6x8 sheets.  For a total of 5 large wounds, daily changeDisp-90 each, R-1, Fax      !! order for DME Equipment being ordered: 4x4 gauze, sterile gauzeDisp-4 Box, R-1, Fax      !! order for DME Equipment being ordered: wound cleanserDisp-1 Bottle, R-1, Fax      !! order for DME Equipment being ordered: Oxygen    Use 2-3 L via NCDisp-1 Bottle, R-0, Local Print      !! ORDER FOR DME Equipment being ordered: surgical shoeDisp-1 Device, R-0, Normal      blood glucose monitoring (ACCU-CHEK MULTICLIX) lancets Use to test blood sugar 4 times daily or as directed.Disp-1 Box, R-prnLocal Print      !! ORDER FOR DME BIPAP 20/11Historical      !! ORDER FOR DME Blood pressure monitor device.Disp-1 Device, R-1, Normal      Respiratory Therapy Supplies (NEBULIZER/TUBING/MOUTHPIECE) KIT 1 kit as needed., Disp-1 kit, R-1, E-Prescribe      !! ORDER FOR DME Oxygen 2 lits/min continuous. From lincareDisp-1 Device, R-1, Historical       !! - Potential duplicate medications found. Please discuss with provider.               Discharge Instructions and Follow-Up:     Discharge Procedure Orders  Medication Therapy Management Referral   Referral Type: Med Therapy Management             Discharge Disposition:   . Body will be  transferred to  home of sister's choice         Condition on Discharge:   Discharge condition:    Code status on discharge:       Date of service: 2017    The patient was seen and discussed with Dr. Love.    Tania Falcon MD PhD  Internal Medicine PGY-2  [phone]    ATTENDING ATTESTATION:  Patient has been seen and evaluated by me on May 11, 2017. I agree with the note above. I discussed the plan of keeping Mr. Hebert comfortable with his sister who just flew in from out of town last night. There will not be an autopsy performed on this patient.    Cathy Love MD, MS  Staff Cardiologist, [facility]   Pager: [phone]

## 2021-06-02 ENCOUNTER — RECORDS - HEALTHEAST (OUTPATIENT)
Dept: ADMINISTRATIVE | Facility: CLINIC | Age: 71
End: 2021-06-02